# Patient Record
Sex: MALE | Race: ASIAN | NOT HISPANIC OR LATINO
[De-identification: names, ages, dates, MRNs, and addresses within clinical notes are randomized per-mention and may not be internally consistent; named-entity substitution may affect disease eponyms.]

---

## 2018-05-16 PROBLEM — Z00.00 ENCOUNTER FOR PREVENTIVE HEALTH EXAMINATION: Status: ACTIVE | Noted: 2018-05-16

## 2018-05-24 ENCOUNTER — APPOINTMENT (OUTPATIENT)
Dept: SURGICAL ONCOLOGY | Facility: CLINIC | Age: 53
End: 2018-05-24

## 2018-07-01 ENCOUNTER — EMERGENCY (EMERGENCY)
Facility: HOSPITAL | Age: 53
LOS: 1 days | Discharge: HOME | End: 2018-07-01
Attending: EMERGENCY MEDICINE | Admitting: EMERGENCY MEDICINE
Payer: MEDICAID

## 2018-07-01 VITALS
HEART RATE: 80 BPM | RESPIRATION RATE: 16 BRPM | DIASTOLIC BLOOD PRESSURE: 71 MMHG | OXYGEN SATURATION: 100 % | TEMPERATURE: 98 F | SYSTOLIC BLOOD PRESSURE: 94 MMHG

## 2018-07-01 DIAGNOSIS — Z98.89 OTHER SPECIFIED POSTPROCEDURAL STATES: Chronic | ICD-10-CM

## 2018-07-01 PROCEDURE — 99282 EMERGENCY DEPT VISIT SF MDM: CPT | Mod: 25

## 2018-07-01 NOTE — ED ADULT TRIAGE NOTE - CHIEF COMPLAINT QUOTE
Pt walk in c/o Eye Redness, Itching Burning sensation since Afternoon. Denies Light sensitivity changes in Vision, Wearing Contact lenses

## 2018-07-01 NOTE — ED PROVIDER NOTE - PHYSICAL EXAMINATION
Gen: Well appearing, well nourished, awake, alert, oriented to person, place, time/situation and in no apparent distress.  Eyes: B/l conjunctival injection. Pupils equal, round and reactive to light. No proptosis. EOMI. Visual acuity 20/20 b/l.   ENMT: Airway patent. Moist mucous membranes.  Cardiac: Normal rate, regular rhythm.  Heart sounds S1, S2. +systolic murmur.  Respiratory: Breath sounds clear and equal bilaterally. No wheezes/rales/rhonchi.  Abdomen: Abdomen soft, non-distended, non-tender, no guarding. Colostomy bag in place.  Musculoskeletal: Atraumatic. No vascular compromise.  Neuro: Alert, follows commands. Speech is clear, fluent, and appropriate. Moving all extremities spontaneously.  Skin: Skin normal color for race, warm, dry and intact. No evidence of rash. Gen: Well appearing, well nourished, awake, alert, oriented to person, place, time/situation and in no apparent distress.  Eyes: B/l conjunctival injection. No chemosis. Pupils equal, round and reactive to light. No proptosis. EOMI. Visual acuity 20/20 OS and 20/25 OD.  ENMT: Airway patent. Moist mucous membranes.  Cardiac: Normal rate, regular rhythm.  Heart sounds S1, S2. +systolic murmur.  Respiratory: Breath sounds clear and equal bilaterally. No wheezes/rales/rhonchi.  Abdomen: Abdomen soft, non-distended, non-tender, no guarding. Colostomy bag in place.  Musculoskeletal: Atraumatic. No vascular compromise.  Neuro: Alert, follows commands. Speech is clear, fluent, and appropriate. Moving all extremities spontaneously.  Skin: Skin normal color for race, warm, dry and intact. No evidence of rash.

## 2018-07-01 NOTE — ED PROVIDER NOTE - PLAN OF CARE
-- Use eye drops as instructed.   -- Follow up in our eye clinic tomorrow. The address is [address] [place] [address] [address], [place] [address]. Call [phone] to make an appointment.  -- Return to ER immediately for new or worsening symptoms, any urgent issues, or for any concerns.

## 2018-07-01 NOTE — ED PROVIDER NOTE - CARE PLAN
Assessment and plan of treatment:	-- Use eye drops as instructed.   -- Follow up in our eye clinic tomorrow. The address is [address] [place] [address] [address], [place] [address]. Call [phone] to make an appointment.  -- Return to ER immediately for new or worsening symptoms, any urgent issues, or for any concerns. Principal Discharge DX:	UV keratitis, bilateral  Assessment and plan of treatment:	-- Use eye drops as instructed.   -- Follow up in our eye clinic tomorrow. The address is [address] [place] [address] [address], [place] [address]. Call [phone] to make an appointment.  -- Return to ER immediately for new or worsening symptoms, any urgent issues, or for any concerns.

## 2018-07-01 NOTE — ED PROVIDER NOTE - ATTENDING CONTRIBUTION TO CARE
pt with B eye pain sudden onset after being outside.  Associated with some tearing.  Acuity reported to be intact     Gen:  Well appearning in NAD  Head:  NC/AT  Eyes: injected.  PERRL EOMI  Resp: No distress   Ext: no deformities  Skin: warm and dry as visualized     Presentation consistent with a UV keratitis.  Will treat symptomatically.

## 2018-07-01 NOTE — ED PROVIDER NOTE - MEDICAL DECISION MAKING DETAILS
PT with b/l eye irritation redness after walking outside. No known exposures. Ddx includes allergic reaction, UV keratitis. Plan: symptomatic control, d/c with close follow up in ophtho clinic.

## 2018-07-01 NOTE — ED PROVIDER NOTE - OBJECTIVE STATEMENT
53M h/o HTN, HLD, colon ca s/p resection (last chemo 6/26) p/w b/l eye irritation, redness, and tearing. Acute onset around 2pm this afternoon while walking outside. No environmental allergies. No known exposures. No blurry vision, diplopia, HA, fever, or trauma to the eye. + mild sensitivity to light. No hx eye surgery. Does not wear glasses or contacts (aware reported otherwise in triage).

## 2018-07-03 ENCOUNTER — APPOINTMENT (OUTPATIENT)
Dept: OPHTHALMOLOGY | Facility: CLINIC | Age: 53
End: 2018-07-03

## 2021-12-03 ENCOUNTER — APPOINTMENT (OUTPATIENT)
Dept: CARDIOLOGY | Facility: CLINIC | Age: 56
End: 2021-12-03

## 2022-05-10 ENCOUNTER — APPOINTMENT (OUTPATIENT)
Dept: CARDIOTHORACIC SURGERY | Facility: CLINIC | Age: 57
End: 2022-05-10
Payer: MEDICAID

## 2022-05-10 ENCOUNTER — APPOINTMENT (OUTPATIENT)
Dept: CV DIAGNOSITCS | Facility: HOSPITAL | Age: 57
End: 2022-05-10
Payer: MEDICAID

## 2022-05-10 ENCOUNTER — OUTPATIENT (OUTPATIENT)
Dept: OUTPATIENT SERVICES | Facility: HOSPITAL | Age: 57
LOS: 1 days | End: 2022-05-10

## 2022-05-10 ENCOUNTER — APPOINTMENT (OUTPATIENT)
Dept: CARDIOLOGY | Facility: CLINIC | Age: 57
End: 2022-05-10
Payer: MEDICAID

## 2022-05-10 ENCOUNTER — NON-APPOINTMENT (OUTPATIENT)
Age: 57
End: 2022-05-10

## 2022-05-10 VITALS
WEIGHT: 193 LBS | SYSTOLIC BLOOD PRESSURE: 121 MMHG | OXYGEN SATURATION: 100 % | BODY MASS INDEX: 28.58 KG/M2 | HEART RATE: 86 BPM | DIASTOLIC BLOOD PRESSURE: 80 MMHG | TEMPERATURE: 98.6 F | HEIGHT: 69 IN

## 2022-05-10 DIAGNOSIS — Z78.9 OTHER SPECIFIED HEALTH STATUS: ICD-10-CM

## 2022-05-10 DIAGNOSIS — Q23.1 CONGENITAL INSUFFICIENCY OF AORTIC VALVE: ICD-10-CM

## 2022-05-10 DIAGNOSIS — I35.0 NONRHEUMATIC AORTIC (VALVE) STENOSIS: ICD-10-CM

## 2022-05-10 DIAGNOSIS — Z86.79 PERSONAL HISTORY OF OTHER DISEASES OF THE CIRCULATORY SYSTEM: ICD-10-CM

## 2022-05-10 DIAGNOSIS — Z85.048 PERSONAL HISTORY OF OTHER MALIGNANT NEOPLASM OF RECTUM, RECTOSIGMOID JUNCTION, AND ANUS: ICD-10-CM

## 2022-05-10 DIAGNOSIS — Z93.59 OTHER CYSTOSTOMY STATUS: ICD-10-CM

## 2022-05-10 DIAGNOSIS — Z87.448 PERSONAL HISTORY OF OTHER DISEASES OF URINARY SYSTEM: ICD-10-CM

## 2022-05-10 DIAGNOSIS — Z82.49 FAMILY HISTORY OF ISCHEMIC HEART DISEASE AND OTHER DISEASES OF THE CIRCULATORY SYSTEM: ICD-10-CM

## 2022-05-10 DIAGNOSIS — Z98.89 OTHER SPECIFIED POSTPROCEDURAL STATES: Chronic | ICD-10-CM

## 2022-05-10 DIAGNOSIS — Z86.39 PERSONAL HISTORY OF OTHER ENDOCRINE, NUTRITIONAL AND METABOLIC DISEASE: ICD-10-CM

## 2022-05-10 DIAGNOSIS — Z87.898 PERSONAL HISTORY OF OTHER SPECIFIED CONDITIONS: ICD-10-CM

## 2022-05-10 PROCEDURE — 99203 OFFICE O/P NEW LOW 30 MIN: CPT

## 2022-05-10 PROCEDURE — 93306 TTE W/DOPPLER COMPLETE: CPT | Mod: 26

## 2022-05-10 RX ORDER — ATORVASTATIN CALCIUM 40 MG/1
40 TABLET, FILM COATED ORAL
Refills: 0 | Status: ACTIVE | COMMUNITY

## 2022-05-10 NOTE — HISTORY OF PRESENT ILLNESS
[FreeTextEntry1] : History obtained from Daughter Rowan Disla  at patient request \par \par 56 year old male with HTN, hyperlipidemia, bicuspid aortic valve with known murmur since childhood, TAA (4.7cm [facility] 2021) colorectal CA 2016 treated with abdominoperineal resection and colostomy with recurrent 11/15/2021 with urethral strictures as/p urethroplasty with local recurrent rectal CA treated with course of chemo 2021 (completed for now) with suprapubic catheter in place for urosepsis (ESBL) in February 2022 who has had a heart murmur since childhood.  \par \par [place] [facility] [place] an episode of atrial fibrillation with subsequent Echo 2/2/2022 revealing EF 60^, with Bicuspid aortic valve with ARIA 0.9cm2, mild AI, moderately dilated ascending aorta.   \par During admission he was started on Amiodarone for the episode of atrial fibrillation. Subsequently, discontinued on Amiodarone therapy 1 month later by his outpatient cardiologist as no further episodes of afib noted and advised that there could be long term side effects. Patient was maintained on Aspirin therapy and Metoprolol. \par \par Since the nicer weather, the patient has been walking in his neighborhood about 4-5 blocks without complaints. Recently went in to [facility] for leisurely walking without complaints.  Admits to being able to walk from basement in his home to 2nd floor without complaints. Denies SOB, chest pain, edema, orthopnea, PND, syncope, increase in fatigue and decrease in exercise tolerance.  \par \par He was referred to [facility] for TAVR evaluation, however, he was advised that he was not a good candidate given his bicuspid valve and was recommended for surgical AVR, however, given patients history of CA he was advised to monitor his AS since he is asymptomatic.  \par \par Patient is here today as he and his daughter, Rowan seeks a second opinion for TAVR evaluation.  \par \par Urologist/Onc[place]acc [place] [facility] [place] \par *OF NOTE: supra-pubic catheter changed by urologist monthly

## 2022-05-10 NOTE — ASSESSMENT
[FreeTextEntry1] : I had the pleasure of evaluating your patient, BÁRBARA Little who is a 56 year old male with heart failure symptoms of NYHA class 2.\par \par Mr. Little has severe AS of his bicuspid aortic valve with repeat TTE today revealing ARIA 0.7cm2, Mean gradient 52mmHg, peak gradient 84mmHg from previos TTE in 11/29/2021 with a peak Gradient of 65 mmHg and a mean Gradient 37 mmHg, and an Aortic Valve Area of 0.8 cm^2.\par Mr. Little's results have been reviewed thoroughly by the Heart Team and at this time, the patient is not a candidate for OLGA given he is asymptomatic in his lifestyle with no change in his quality of life. He continues to have continuous urological interventions for recurrent rectal CA in the pelvis interfering with the ureters.  In the future if the patient becomes symptomatic we will be more than happy to re-evaluate for possible OLGA.\par \par Plan: \par - follow up in the [facility] TAVR clinic in 6months to re-evaluate symptoms unless he develops symptoms in the meantime, then patient advised to return sooner \par - pt aware of TAA and is monitored outside of [facility] - pt to have follow up CT scan, if not done within these 6months, will need CT chest with contrast to evaluate TAA \par - follow up with your Cardiologist for serial Echocardiograms to monitor severity of AS\par - continue current medications as prescribed\par - continue close follow up [place] [facility] please bring more current information in regards to prognosis for the next office visit\par

## 2022-05-10 NOTE — REVIEW OF SYSTEMS
[Lower Ext Edema] : lower extremity edema [Shortness Of Breath] : shortness of breath [SOB on Exertion] : shortness of breath during exertion [Negative] : Heme/Lymph [Chest Pain] : no chest pain [Wheezing] : no wheezing [Cough] : no cough [Orthopnea] : no orthopnea [PND] : no PND [Dysuria] : no dysuria [Incontinence] : no incontinence [Hesitancy] : no urinary hesitancy [Nocturia] : no nocturia [FreeTextEntry7] : has colostomy in place  [FreeTextEntry8] : has chronic suprapubic catheter in place

## 2022-05-10 NOTE — REASON FOR VISIT
[Initial Evaluation] : an initial evaluation [Family Member] : family member [Source: ______] : History obtained from [unfilled]

## 2022-05-10 NOTE — PHYSICAL EXAM
[General Appearance - Alert] : alert [General Appearance - In No Acute Distress] : in no acute distress [General Appearance - Well Nourished] : well nourished [General Appearance - Well Developed] : well developed [Sclera] : the sclera and conjunctiva were normal [Jugular Venous Distention Increased] : there was no jugular-venous distention [Exaggerated Use Of Accessory Muscles For Inspiration] : no accessory muscle use [Auscultation Breath Sounds / Voice Sounds] : lungs were clear to auscultation bilaterally [III] : a grade 3 [No Pitting Edema] : no pitting edema present [Bowel Sounds] : normal bowel sounds [Abdomen Soft] : soft [Abdomen Tenderness] : non-tender [Abnormal Walk] : normal gait [Musculoskeletal - Swelling] : no joint swelling seen [] : no rash [Cranial Nerves] : cranial nerves 2-12 were intact [Oriented To Time, Place, And Person] : oriented to person, place, and time [Affect] : the affect was normal [Mood] : the mood was normal [FreeTextEntry1] : suprbubic catheter in place with no erythema, no warmth and nontender at site

## 2022-05-24 PROBLEM — Q23.1 BICUSPID AORTIC VALVE: Status: ACTIVE | Noted: 2022-05-10

## 2022-05-24 NOTE — ASSESSMENT
[FreeTextEntry1] : Mr. Little has a bicuspid aortic valve with severe asymptomatic aortic stenosis. I have reviewed his results and discussed treatment options. Given that he is asymptomatic with no impact on quality of life he will continue continued close observation. \par \par Plan: \par - follow up in the [facility] TAVR clinic in 6 months to re-evaluate symptoms unless he develops symptoms in the meantime, then patient advised to return sooner \par - pt aware of TAA and is monitored outside of [facility] - pt to have follow up CT scan, if not done within these 6 months, will need CT chest with contrast to evaluate TAA \par \par \par

## 2022-05-24 NOTE — REVIEW OF SYSTEMS
[Lower Ext Edema] : lower extremity edema [As Noted in HPI] : as noted in HPI [Negative] : Endocrine [Feeling Tired] : not feeling tired [FreeTextEntry7] : colostomy [FreeTextEntry8] : suprapubic catheter

## 2022-05-24 NOTE — HISTORY OF PRESENT ILLNESS
[FreeTextEntry1] : Mr. Little is a 56 year old male, here seeking a second opinion for treatment options of aortic stenosis with a known bicuspid aortic valve. He is feeling well with no dyspnea, angina, PND, orthopnea, dizziness, or edema. He has been able to maintain his usual level of activity with no decline in stamina or activity tolerance. He was evaluated at [facility] where he was recommended to continue conservative management given that he is asymptomatic. \par \par His past medical history includes HTN, hyperlipidemia, bicuspid aortic valve with known murmur since childhood, TAA (4.7cm Mar 2021) colorectal CA 2016 treated with abdominoperineal resection and colostomy with recurrent 11/15/2021 with urethral strictures as/p urethroplasty with local recurrent rectal CA treated with course of chemo 2021 (completed for now) with suprapubic catheter in place for urosepsis (ESBL) in February 2022 who has had a heart murmur since childhood. He had an episode of paroxysmal AFib in February while hospitalized, initially treated with Amiodarone which was discontinued as an outpt. He is currently on ASA and Metoprolol, he has had no further episodes of AFib. \par \par Patient is here today as he and his daughter, Rowan seeks a second opinion for TAVR evaluation. \par \par [place]rologist[place] [facility] [place]

## 2022-05-24 NOTE — PHYSICAL EXAM
[General Appearance - Alert] : alert [General Appearance - In No Acute Distress] : in no acute distress [Sclera] : the sclera and conjunctiva were normal [Neck Appearance] : the appearance of the neck was normal [Auscultation Breath Sounds / Voice Sounds] : lungs were clear to auscultation bilaterally [Normal Rate] : normal [III] : a grade 3 [No Pitting Edema] : no pitting edema present [Abnormal Walk] : normal gait [No Focal Deficits] : no focal deficits [Oriented To Time, Place, And Person] : oriented to person, place, and time [FreeTextEntry1] : suprapubic cath

## 2022-11-09 ENCOUNTER — APPOINTMENT (OUTPATIENT)
Dept: CARDIOLOGY | Facility: CLINIC | Age: 57
End: 2022-11-09

## 2023-07-12 ENCOUNTER — NON-APPOINTMENT (OUTPATIENT)
Age: 58
End: 2023-07-12

## 2023-07-12 ENCOUNTER — APPOINTMENT (OUTPATIENT)
Dept: CARDIOLOGY | Facility: CLINIC | Age: 58
End: 2023-07-12
Payer: MEDICAID

## 2023-07-12 VITALS
WEIGHT: 175.44 LBS | HEART RATE: 81 BPM | DIASTOLIC BLOOD PRESSURE: 77 MMHG | HEIGHT: 69 IN | OXYGEN SATURATION: 99 % | SYSTOLIC BLOOD PRESSURE: 110 MMHG | BODY MASS INDEX: 25.99 KG/M2

## 2023-07-12 PROCEDURE — 99214 OFFICE O/P EST MOD 30 MIN: CPT | Mod: 25

## 2023-07-12 PROCEDURE — 93000 ELECTROCARDIOGRAM COMPLETE: CPT

## 2023-07-12 NOTE — DISCUSSION/SUMMARY
[FreeTextEntry1] : Aortic stenosis, patient is asymptomatic at the moment, I recommend we follow him up in 3 months and reevaluate with echo and CT.  Patient understands because of his dilated aorta bicuspid valve he will likely need evaluation from surgery for aortic valve replacement.  I had a detailed discussion with the patient and his daughter about the natural history of the valve condition that he has and educated them about the warning signs.  He is on beta-blocker metoprolol's 37.5 mg extended release once daily that he will continue. [EKG obtained to assist in diagnosis and management of assessed problem(s)] : EKG obtained to assist in diagnosis and management of assessed problem(s)

## 2023-07-12 NOTE — REVIEW OF SYSTEMS
[Feeling Fatigued] : feeling fatigued [Blurry Vision] : no blurred vision [SOB] : no shortness of breath [Dyspnea on exertion] : not dyspnea during exertion [Leg Claudication] : no intermittent leg claudication

## 2023-07-12 NOTE — HISTORY OF PRESENT ILLNESS
[FreeTextEntry1] : 58-year-old male history of severe aortic stenosis bicuspid aortic valve dilated ascending aorta measuring 4.1 cm last year.  Patient is status post pelvic surgery for malignancy and has a colostomy bag.  Overall he feels that he is just generally feeling weak but no overt symptoms of shortness of breath chest pain orthopnea PND syncope.  As per the patient and his daughter they were evaluated at [facility] in the tarry operative.  Had echo done over there that shows valve area of 0.9 cm² with a mean gradient of 50.  EF is normal.  Since patient was asymptomatic he has been managed conservatively.  They had also seek consultation at [facility] and because of his dilated aorta patient was told that he will likely need open open heart for his valve and the dilated aorta in the future.

## 2023-07-12 NOTE — PHYSICAL EXAM
[Frail] : frail [Normal Conjunctiva] : normal conjunctiva [Normal Venous Pressure] : normal venous pressure [de-identified] : 3 out of 6 late peaking ejection systolic murmur at the aortic area.

## 2023-11-08 ENCOUNTER — APPOINTMENT (OUTPATIENT)
Dept: CARDIOLOGY | Facility: CLINIC | Age: 58
End: 2023-11-08

## 2024-04-03 ENCOUNTER — NON-APPOINTMENT (OUTPATIENT)
Age: 59
End: 2024-04-03

## 2024-04-03 ENCOUNTER — APPOINTMENT (OUTPATIENT)
Dept: CARDIOLOGY | Facility: CLINIC | Age: 59
End: 2024-04-03
Payer: MEDICAID

## 2024-04-03 VITALS
BODY MASS INDEX: 23.18 KG/M2 | OXYGEN SATURATION: 98 % | HEIGHT: 69 IN | DIASTOLIC BLOOD PRESSURE: 69 MMHG | SYSTOLIC BLOOD PRESSURE: 106 MMHG | WEIGHT: 156.53 LBS | HEART RATE: 85 BPM

## 2024-04-03 DIAGNOSIS — I35.0 NONRHEUMATIC AORTIC (VALVE) STENOSIS: ICD-10-CM

## 2024-04-03 DIAGNOSIS — I71.20 THORACIC AORTIC ANEURYSM, WITHOUT RUPTURE, UNSPECIFIED: ICD-10-CM

## 2024-04-03 PROCEDURE — 93000 ELECTROCARDIOGRAM COMPLETE: CPT

## 2024-04-03 PROCEDURE — 99214 OFFICE O/P EST MOD 30 MIN: CPT | Mod: 25

## 2024-04-03 NOTE — PHYSICAL EXAM
[Frail] : frail [Normal Conjunctiva] : normal conjunctiva [Normal Venous Pressure] : normal venous pressure [de-identified] : 3 out of 6 late peaking ejection systolic murmur at the aortic area.

## 2024-04-03 NOTE — DISCUSSION/SUMMARY
[FreeTextEntry1] : Aortic stenosis: On today's evaluation it appears that patient had recurrence of his malignancy leading to pathological fracture in the pelvis.  He is being followed up by [facility], they had echocardiogram performed in November last year that showed mean aortic valve gradient was 59 mmHg.  EF was reportedly 55%.  I spoke to the daughter and in discussion with her recommend that we get echocardiogram to reassess his valve and also get measurements on his ascending aorta on the transthoracic echo and if this is inconclusive perhaps get a CTA chest to look for the aortic measurements.  I also gave my information to the family and forwarded to the [facility] physicians who they are seeing on April 10 to have a discussion about his overall prognosis that will be important to know and make a decision for his aortic valve disease. [EKG obtained to assist in diagnosis and management of assessed problem(s)] : EKG obtained to assist in diagnosis and management of assessed problem(s)

## 2024-04-03 NOTE — HISTORY OF PRESENT ILLNESS
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  He does admit that he has pain in the pelvic area with the tumor pressing onto the pelvis that was the cause of the fracture.  He is still able to climb a flight of stairs without any dyspnea, chest pain or dizziness.

## 2024-04-09 ENCOUNTER — OUTPATIENT (OUTPATIENT)
Dept: OUTPATIENT SERVICES | Facility: HOSPITAL | Age: 59
LOS: 1 days | End: 2024-04-09
Payer: MEDICAID

## 2024-04-09 ENCOUNTER — RESULT REVIEW (OUTPATIENT)
Age: 59
End: 2024-04-09

## 2024-04-09 ENCOUNTER — TRANSCRIPTION ENCOUNTER (OUTPATIENT)
Age: 59
End: 2024-04-09

## 2024-04-09 ENCOUNTER — APPOINTMENT (OUTPATIENT)
Dept: CV DIAGNOSITCS | Facility: HOSPITAL | Age: 59
End: 2024-04-09

## 2024-04-09 DIAGNOSIS — I35.0 NONRHEUMATIC AORTIC (VALVE) STENOSIS: ICD-10-CM

## 2024-04-09 DIAGNOSIS — Z98.89 OTHER SPECIFIED POSTPROCEDURAL STATES: Chronic | ICD-10-CM

## 2024-04-09 PROCEDURE — 93356 MYOCRD STRAIN IMG SPCKL TRCK: CPT

## 2024-04-09 PROCEDURE — 76376 3D RENDER W/INTRP POSTPROCES: CPT | Mod: 26

## 2024-04-09 PROCEDURE — 93306 TTE W/DOPPLER COMPLETE: CPT | Mod: 26

## 2024-04-30 RX ORDER — METOPROLOL SUCCINATE 25 MG/1
25 TABLET, EXTENDED RELEASE ORAL DAILY
Qty: 135 | Refills: 3 | Status: ACTIVE | COMMUNITY
Start: 1900-01-01 | End: 1900-01-01

## 2024-07-03 ENCOUNTER — APPOINTMENT (OUTPATIENT)
Dept: CARDIOLOGY | Facility: CLINIC | Age: 59
End: 2024-07-03

## 2025-04-25 ENCOUNTER — INPATIENT (INPATIENT)
Facility: HOSPITAL | Age: 60
LOS: 10 days | Discharge: HOSPICE | End: 2025-05-06
Attending: INTERNAL MEDICINE | Admitting: INTERNAL MEDICINE
Payer: OTHER MISCELLANEOUS

## 2025-04-25 VITALS
TEMPERATURE: 98 F | RESPIRATION RATE: 18 BRPM | HEIGHT: 71 IN | OXYGEN SATURATION: 99 % | SYSTOLIC BLOOD PRESSURE: 93 MMHG | HEART RATE: 131 BPM | WEIGHT: 164.91 LBS | DIASTOLIC BLOOD PRESSURE: 62 MMHG

## 2025-04-25 DIAGNOSIS — Z98.89 OTHER SPECIFIED POSTPROCEDURAL STATES: Chronic | ICD-10-CM

## 2025-04-25 LAB
APTT BLD: 37.4 SEC — HIGH (ref 26.1–36.8)
HCT VFR BLD CALC: 23.1 % — LOW (ref 39–50)
HGB BLD-MCNC: 7.3 G/DL — LOW (ref 13–17)
INR BLD: 1.45 RATIO — HIGH (ref 0.85–1.16)
MCHC RBC-ENTMCNC: 27 PG — SIGNIFICANT CHANGE UP (ref 27–34)
MCHC RBC-ENTMCNC: 31.6 G/DL — LOW (ref 32–36)
MCV RBC AUTO: 85.6 FL — SIGNIFICANT CHANGE UP (ref 80–100)
PLATELET # BLD AUTO: 379 K/UL — SIGNIFICANT CHANGE UP (ref 150–400)
PROTHROM AB SERPL-ACNC: 16.8 SEC — HIGH (ref 9.9–13.4)
RBC # BLD: 2.7 M/UL — LOW (ref 4.2–5.8)
RBC # FLD: 19.9 % — HIGH (ref 10.3–14.5)
WBC # BLD: 12.68 K/UL — HIGH (ref 3.8–10.5)
WBC # FLD AUTO: 12.68 K/UL — HIGH (ref 3.8–10.5)

## 2025-04-25 PROCEDURE — 99285 EMERGENCY DEPT VISIT HI MDM: CPT

## 2025-04-25 PROCEDURE — 93010 ELECTROCARDIOGRAM REPORT: CPT

## 2025-04-25 NOTE — ED PROVIDER NOTE - CLINICAL SUMMARY MEDICAL DECISION MAKING FREE TEXT BOX
58yo male with pmh colon cancer htn on hospice due to colon cancer presenting with hematuria.  Has urostomy and neprhrostomy tube which has been putting out very dark bloody urine.  Has not happened before.  Hospice nurse told them to go to ED and they tried to drive to [facility] [place] [place] but has baseline pain and was unable to tolerate car ride so called EMS and brought him to nearest ED.  Patient is slightly hypotensive but bp low at baseline.  Will need labs to eval for anemia, uti.  Will d/w msk and reassess.

## 2025-04-25 NOTE — ED PROVIDER NOTE - PHYSICAL EXAMINATION
General appearance: Nontoxic appearing, conversant, afebrile    Eyes: anicteric sclerae, KEATON, EOMI   HENT: Atraumatic; oropharynx clear, MMM and no ulcerations, no pharyngeal erythema or exudate   Neck: Trachea midline; Full range of motion, supple   Pulm: CTA bl, normal respiratory effort and no intercostal retractions, normal work of breathing   CV: Tachycardic, regular, No murmurs, rubs, or gallops   Abdomen: Soft, non-tender, distended; no guarding or rebound, + colostomy with stool, + R urostomy with dark red urine   Extremities: No peripheral edema, no gross deformities, FROM x4   Skin: Dry, normal temperature, turgor and texture; no rash   Psych: Appropriate affect, cooperative

## 2025-04-25 NOTE — ED PROVIDER NOTE - PROGRESS NOTE DETAILS
Spoke with [facility] transfer center - per Dr. La over phone, the hospital is full and ED has many boarders, they are on diversion at this time and unable to accept transfers.  Recommending trying again in the daytime.  Spoke with urology, no acute interventions here.  + UA but per daughter, historically colonized.  Will treat with new symptoms and admit.

## 2025-04-25 NOTE — ED ADULT TRIAGE NOTE - CHIEF COMPLAINT QUOTE
bibems on hospice care for colorectal cancer.  Per EMS, family noticed bleeding from urostomy bag around 1830.   Pt has fentanyl patch on right upper arm and morphine pump.  Pt on augmentin for chronic infections.   hx of colorectal cancer, afib, HLD, right hip fx.  nkda.

## 2025-04-25 NOTE — ED PROVIDER NOTE - NS ED MD DISPO ISOLATION TYPES
Problem: Chronic Conditions and Co-morbidities  Goal: Patient's chronic conditions and co-morbidity symptoms are monitored and maintained or improved  12/30/2024 2200 by Zina Garcia RN  Outcome: Progressing     Problem: Safety - Adult  Goal: Free from fall injury  12/30/2024 2200 by Zina Garcia RN  Outcome: Progressing     Problem: Skin/Tissue Integrity  Goal: Absence of new skin breakdown  Description: 1.  Monitor for areas of redness and/or skin breakdown  2.  Assess vascular access sites hourly  3.  Every 4-6 hours minimum:  Change oxygen saturation probe site  4.  Every 4-6 hours:  If on nasal continuous positive airway pressure, respiratory therapy assess nares and determine need for appliance change or resting period.  12/30/2024 2200 by Zina Garcia RN  Outcome: Progressing     Problem: ABCDS Injury Assessment  Goal: Absence of physical injury  12/30/2024 2200 by Zina Garcia RN  Outcome: Progressing     Problem: Discharge Planning  Goal: Discharge to home or other facility with appropriate resources  12/30/2024 2200 by Zina Garcia RN  Outcome: Progressing     Problem: Pain  Goal: Verbalizes/displays adequate comfort level or baseline comfort level  12/30/2024 2200 by Zina Garcia RN  Outcome: Progressing      None

## 2025-04-26 DIAGNOSIS — Z29.9 ENCOUNTER FOR PROPHYLACTIC MEASURES, UNSPECIFIED: ICD-10-CM

## 2025-04-26 DIAGNOSIS — I48.0 PAROXYSMAL ATRIAL FIBRILLATION: ICD-10-CM

## 2025-04-26 DIAGNOSIS — E78.5 HYPERLIPIDEMIA, UNSPECIFIED: ICD-10-CM

## 2025-04-26 DIAGNOSIS — Z93.3 COLOSTOMY STATUS: Chronic | ICD-10-CM

## 2025-04-26 DIAGNOSIS — Z98.890 OTHER SPECIFIED POSTPROCEDURAL STATES: Chronic | ICD-10-CM

## 2025-04-26 DIAGNOSIS — I10 ESSENTIAL (PRIMARY) HYPERTENSION: ICD-10-CM

## 2025-04-26 DIAGNOSIS — L98.429 NON-PRESSURE CHRONIC ULCER OF BACK WITH UNSPECIFIED SEVERITY: ICD-10-CM

## 2025-04-26 DIAGNOSIS — R31.9 HEMATURIA, UNSPECIFIED: ICD-10-CM

## 2025-04-26 DIAGNOSIS — C19 MALIGNANT NEOPLASM OF RECTOSIGMOID JUNCTION: ICD-10-CM

## 2025-04-26 LAB
ABO RH CONFIRMATION: SIGNIFICANT CHANGE UP
ALBUMIN SERPL ELPH-MCNC: 1.3 G/DL — LOW (ref 3.3–5)
ALP SERPL-CCNC: 93 U/L — SIGNIFICANT CHANGE UP (ref 40–120)
ALT FLD-CCNC: 7 U/L — LOW (ref 12–78)
ANION GAP SERPL CALC-SCNC: 6 MMOL/L — SIGNIFICANT CHANGE UP (ref 5–17)
ANION GAP SERPL CALC-SCNC: 8 MMOL/L — SIGNIFICANT CHANGE UP (ref 5–17)
ANISOCYTOSIS BLD QL: SLIGHT — SIGNIFICANT CHANGE UP
APPEARANCE UR: ABNORMAL
AST SERPL-CCNC: 10 U/L — LOW (ref 15–37)
BACTERIA # UR AUTO: ABNORMAL /HPF
BASOPHILS # BLD AUTO: 0 K/UL — SIGNIFICANT CHANGE UP (ref 0–0.2)
BASOPHILS NFR BLD AUTO: 0 % — SIGNIFICANT CHANGE UP (ref 0–2)
BILIRUB SERPL-MCNC: 0.6 MG/DL — SIGNIFICANT CHANGE UP (ref 0.2–1.2)
BILIRUB UR-MCNC: ABNORMAL
BLD GP AB SCN SERPL QL: SIGNIFICANT CHANGE UP
BUN SERPL-MCNC: 25 MG/DL — HIGH (ref 7–23)
BUN SERPL-MCNC: 28 MG/DL — HIGH (ref 7–23)
CALCIUM SERPL-MCNC: 7.3 MG/DL — LOW (ref 8.5–10.1)
CALCIUM SERPL-MCNC: 7.7 MG/DL — LOW (ref 8.5–10.1)
CHLORIDE SERPL-SCNC: 109 MMOL/L — HIGH (ref 96–108)
CHLORIDE SERPL-SCNC: 110 MMOL/L — HIGH (ref 96–108)
CO2 SERPL-SCNC: 20 MMOL/L — LOW (ref 22–31)
CO2 SERPL-SCNC: 22 MMOL/L — SIGNIFICANT CHANGE UP (ref 22–31)
COLOR SPEC: ABNORMAL
CREAT SERPL-MCNC: 0.78 MG/DL — SIGNIFICANT CHANGE UP (ref 0.5–1.3)
CREAT SERPL-MCNC: 0.92 MG/DL — SIGNIFICANT CHANGE UP (ref 0.5–1.3)
DIFF PNL FLD: ABNORMAL
EGFR: 103 ML/MIN/1.73M2 — SIGNIFICANT CHANGE UP
EGFR: 103 ML/MIN/1.73M2 — SIGNIFICANT CHANGE UP
EGFR: 96 ML/MIN/1.73M2 — SIGNIFICANT CHANGE UP
EGFR: 96 ML/MIN/1.73M2 — SIGNIFICANT CHANGE UP
ELLIPTOCYTES BLD QL SMEAR: SLIGHT — SIGNIFICANT CHANGE UP
EOSINOPHIL # BLD AUTO: 0 K/UL — SIGNIFICANT CHANGE UP (ref 0–0.5)
EOSINOPHIL NFR BLD AUTO: 0 % — SIGNIFICANT CHANGE UP (ref 0–6)
EPI CELLS # UR: PRESENT
GLUCOSE SERPL-MCNC: 117 MG/DL — HIGH (ref 70–99)
GLUCOSE SERPL-MCNC: 94 MG/DL — SIGNIFICANT CHANGE UP (ref 70–99)
GLUCOSE UR QL: NEGATIVE MG/DL — SIGNIFICANT CHANGE UP
HCT VFR BLD CALC: 18.9 % — CRITICAL LOW (ref 39–50)
HCT VFR BLD CALC: 21.3 % — LOW (ref 39–50)
HGB BLD-MCNC: 6 G/DL — CRITICAL LOW (ref 13–17)
HGB BLD-MCNC: 6.8 G/DL — CRITICAL LOW (ref 13–17)
HYPOCHROMIA BLD QL: SIGNIFICANT CHANGE UP
KETONES UR-MCNC: NEGATIVE MG/DL — SIGNIFICANT CHANGE UP
LEUKOCYTE ESTERASE UR-ACNC: ABNORMAL
LYMPHOCYTES # BLD AUTO: 0.38 K/UL — LOW (ref 1–3.3)
LYMPHOCYTES # BLD AUTO: 3 % — LOW (ref 13–44)
MANUAL SMEAR VERIFICATION: SIGNIFICANT CHANGE UP
MCHC RBC-ENTMCNC: 27.2 PG — SIGNIFICANT CHANGE UP (ref 27–34)
MCHC RBC-ENTMCNC: 27.3 PG — SIGNIFICANT CHANGE UP (ref 27–34)
MCHC RBC-ENTMCNC: 31.7 G/DL — LOW (ref 32–36)
MCHC RBC-ENTMCNC: 31.9 G/DL — LOW (ref 32–36)
MCV RBC AUTO: 85.2 FL — SIGNIFICANT CHANGE UP (ref 80–100)
MCV RBC AUTO: 85.9 FL — SIGNIFICANT CHANGE UP (ref 80–100)
MICROCYTES BLD QL: SLIGHT — SIGNIFICANT CHANGE UP
MONOCYTES # BLD AUTO: 0.51 K/UL — SIGNIFICANT CHANGE UP (ref 0–0.9)
MONOCYTES NFR BLD AUTO: 4 % — SIGNIFICANT CHANGE UP (ref 2–14)
NEUTROPHILS # BLD AUTO: 11.79 K/UL — HIGH (ref 1.8–7.4)
NEUTROPHILS NFR BLD AUTO: 92 % — HIGH (ref 43–77)
NEUTS BAND # BLD: 1 % — SIGNIFICANT CHANGE UP (ref 0–8)
NEUTS BAND NFR BLD: 1 % — SIGNIFICANT CHANGE UP (ref 0–8)
NITRITE UR-MCNC: POSITIVE
NRBC # BLD: 0 /100 WBCS — SIGNIFICANT CHANGE UP (ref 0–0)
NRBC BLD AUTO-RTO: 0 /100 WBCS — SIGNIFICANT CHANGE UP (ref 0–0)
NRBC BLD AUTO-RTO: 0 /100 WBCS — SIGNIFICANT CHANGE UP (ref 0–0)
NRBC BLD AUTO-RTO: SIGNIFICANT CHANGE UP /100 WBCS (ref 0–0)
NRBC BLD-RTO: 0 /100 WBCS — SIGNIFICANT CHANGE UP (ref 0–0)
OVALOCYTES BLD QL SMEAR: SLIGHT — SIGNIFICANT CHANGE UP
PH UR: 7 — SIGNIFICANT CHANGE UP (ref 5–8)
PLAT MORPH BLD: NORMAL — SIGNIFICANT CHANGE UP
PLATELET # BLD AUTO: 276 K/UL — SIGNIFICANT CHANGE UP (ref 150–400)
PLATELET # BLD AUTO: 335 K/UL — SIGNIFICANT CHANGE UP (ref 150–400)
POTASSIUM SERPL-MCNC: 3.9 MMOL/L — SIGNIFICANT CHANGE UP (ref 3.5–5.3)
POTASSIUM SERPL-MCNC: 4.1 MMOL/L — SIGNIFICANT CHANGE UP (ref 3.5–5.3)
POTASSIUM SERPL-SCNC: 3.9 MMOL/L — SIGNIFICANT CHANGE UP (ref 3.5–5.3)
POTASSIUM SERPL-SCNC: 4.1 MMOL/L — SIGNIFICANT CHANGE UP (ref 3.5–5.3)
PROT SERPL-MCNC: 5.1 GM/DL — LOW (ref 6–8.3)
PROT UR-MCNC: >=1000 MG/DL
RBC # BLD: 2.2 M/UL — LOW (ref 4.2–5.8)
RBC # BLD: 2.5 M/UL — LOW (ref 4.2–5.8)
RBC # FLD: 20.2 % — HIGH (ref 10.3–14.5)
RBC # FLD: 20.3 % — HIGH (ref 10.3–14.5)
RBC BLD AUTO: ABNORMAL
RBC CASTS # UR COMP ASSIST: ABNORMAL /HPF
SODIUM SERPL-SCNC: 137 MMOL/L — SIGNIFICANT CHANGE UP (ref 135–145)
SODIUM SERPL-SCNC: 138 MMOL/L — SIGNIFICANT CHANGE UP (ref 135–145)
SP GR SPEC: 1.03 — SIGNIFICANT CHANGE UP (ref 1–1.03)
UROBILINOGEN FLD QL: 0.2 MG/DL — SIGNIFICANT CHANGE UP (ref 0.2–1)
WBC # BLD: 10.72 K/UL — HIGH (ref 3.8–10.5)
WBC # BLD: 9.33 K/UL — SIGNIFICANT CHANGE UP (ref 3.8–10.5)
WBC # FLD AUTO: 10.72 K/UL — HIGH (ref 3.8–10.5)
WBC # FLD AUTO: 9.33 K/UL — SIGNIFICANT CHANGE UP (ref 3.8–10.5)
WBC UR QL: 8 /HPF — HIGH (ref 0–5)

## 2025-04-26 PROCEDURE — 99223 1ST HOSP IP/OBS HIGH 75: CPT | Mod: GW

## 2025-04-26 PROCEDURE — 99497 ADVNCD CARE PLAN 30 MIN: CPT

## 2025-04-26 RX ORDER — SIMETHICONE 80 MG
1.2 TABLET,CHEWABLE ORAL
Refills: 0 | DISCHARGE

## 2025-04-26 RX ORDER — SIMETHICONE 80 MG
80 TABLET,CHEWABLE ORAL DAILY
Refills: 0 | Status: DISCONTINUED | OUTPATIENT
Start: 2025-04-26 | End: 2025-05-06

## 2025-04-26 RX ORDER — NALOXONE HYDROCHLORIDE 0.4 MG/ML
0.4 INJECTION, SOLUTION INTRAMUSCULAR; INTRAVENOUS; SUBCUTANEOUS
Refills: 0 | Status: DISCONTINUED | OUTPATIENT
Start: 2025-04-26 | End: 2025-04-26

## 2025-04-26 RX ORDER — PIPERACILLIN-TAZO-DEXTROSE,ISO 2.25G/50ML
3.38 IV SOLUTION, PIGGYBACK PREMIX FROZEN(ML) INTRAVENOUS ONCE
Refills: 0 | Status: COMPLETED | OUTPATIENT
Start: 2025-04-26 | End: 2025-04-26

## 2025-04-26 RX ORDER — MIDODRINE HYDROCHLORIDE 5 MG/1
10 TABLET ORAL ONCE
Refills: 0 | Status: COMPLETED | OUTPATIENT
Start: 2025-04-26 | End: 2025-04-26

## 2025-04-26 RX ORDER — OLANZAPINE 10 MG/1
5 TABLET ORAL AT BEDTIME
Refills: 0 | Status: DISCONTINUED | OUTPATIENT
Start: 2025-04-26 | End: 2025-05-06

## 2025-04-26 RX ORDER — MELATONIN 5 MG
3 TABLET ORAL AT BEDTIME
Refills: 0 | Status: DISCONTINUED | OUTPATIENT
Start: 2025-04-26 | End: 2025-05-06

## 2025-04-26 RX ORDER — PIPERACILLIN-TAZO-DEXTROSE,ISO 2.25G/50ML
3.38 IV SOLUTION, PIGGYBACK PREMIX FROZEN(ML) INTRAVENOUS EVERY 8 HOURS
Refills: 0 | Status: DISCONTINUED | OUTPATIENT
Start: 2025-04-26 | End: 2025-04-28

## 2025-04-26 RX ORDER — CEFTRIAXONE 500 MG/1
1000 INJECTION, POWDER, FOR SOLUTION INTRAMUSCULAR; INTRAVENOUS ONCE
Refills: 0 | Status: COMPLETED | OUTPATIENT
Start: 2025-04-26 | End: 2025-04-26

## 2025-04-26 RX ORDER — RAMELTEON 8 MG/1
1 TABLET, FILM COATED ORAL
Refills: 0 | DISCHARGE

## 2025-04-26 RX ORDER — AMOXICILLIN AND CLAVULANATE POTASSIUM 500; 125 MG/1; MG/1
400 TABLET, FILM COATED ORAL DAILY
Refills: 0 | Status: DISCONTINUED | OUTPATIENT
Start: 2025-04-26 | End: 2025-04-26

## 2025-04-26 RX ORDER — OLANZAPINE 10 MG/1
5 TABLET ORAL AT BEDTIME
Refills: 0 | Status: DISCONTINUED | OUTPATIENT
Start: 2025-04-26 | End: 2025-04-28

## 2025-04-26 RX ORDER — AMIODARONE HYDROCHLORIDE 50 MG/ML
100 INJECTION, SOLUTION INTRAVENOUS DAILY
Refills: 0 | Status: DISCONTINUED | OUTPATIENT
Start: 2025-04-26 | End: 2025-05-01

## 2025-04-26 RX ORDER — ACETAMINOPHEN 500 MG/5ML
650 LIQUID (ML) ORAL EVERY 6 HOURS
Refills: 0 | Status: DISCONTINUED | OUTPATIENT
Start: 2025-04-26 | End: 2025-05-06

## 2025-04-26 RX ADMIN — CEFTRIAXONE 100 MILLIGRAM(S): 500 INJECTION, POWDER, FOR SOLUTION INTRAMUSCULAR; INTRAVENOUS at 04:41

## 2025-04-26 RX ADMIN — Medication 20 MILLIGRAM(S): at 12:35

## 2025-04-26 RX ADMIN — Medication 30 MILLILITER(S): at 17:50

## 2025-04-26 RX ADMIN — AMIODARONE HYDROCHLORIDE 100 MILLIGRAM(S): 50 INJECTION, SOLUTION INTRAVENOUS at 06:34

## 2025-04-26 RX ADMIN — Medication 25 GRAM(S): at 18:44

## 2025-04-26 RX ADMIN — Medication 25 GRAM(S): at 12:34

## 2025-04-26 RX ADMIN — Medication 30 MILLILITER(S): at 18:41

## 2025-04-26 RX ADMIN — MIDODRINE HYDROCHLORIDE 10 MILLIGRAM(S): 5 TABLET ORAL at 06:33

## 2025-04-26 RX ADMIN — Medication 200 GRAM(S): at 06:33

## 2025-04-26 NOTE — CHART NOTE - NSCHARTNOTEFT_GEN_A_CORE
Nursing supervisor asked for help with patient's PCA morphine pump.   Family at bedside. I discussed with the Family (son and daughter) about home PCA morphine use and inpatient dosage I placed in the sunrise.   I updated nurse about it.   I updated Dr Correa about it and also nocturnist Dr Little.   Per pharmacy, we can use iv narcan instead of spray in case of narcotic overdose.  Urology is consulted for hematuria by attending doc.

## 2025-04-26 NOTE — H&P ADULT - PROBLEM SELECTOR PLAN 5
-Continue with home amiodarone 100 mg QD   -No anticoagulation given hematuria   -Risks and benefits discussed with patient and daughter

## 2025-04-26 NOTE — H&P ADULT - HISTORY OF PRESENT ILLNESS
59M w/ PMHx of colorectal CA s/p colostomy on hospice care, urethral stricture s/p urostomy bag, severe AS/Bicuspid AV, TAA, HTN, HLD, pAfib, sacral ulcer s/p wound bag and right hip fracture who presents to the ED with bleeding from urostomy bag around 1830. History is obtained from daughter at bedside. Per daughter, there has been increased bleeding and clots in the urostomy bsg since yesterday. Patient has has history of multiple antibiotic resistance UTI. Patient currently denies any acute symptoms or concerns. Denies headache, dizziness, chest pain, palpitations, SOB, abdominal pain, joint pain, diarrhea/constipation, or urinary symptoms.

## 2025-04-26 NOTE — PROVIDER CONTACT NOTE (MEDICATION) - SITUATION
Dr. Morton contacted to clarify PCA pump order
Received patient from ED hold with morphine PCA pump from home, family requesting to keep using home PCA pump while patient is in hospital

## 2025-04-26 NOTE — H&P ADULT - PROBLEM SELECTOR PLAN 2
::colorectal CA s/p colostomy on hospice care  -Oncologist:: Dr Aneudy Contreras at [facility]   -Urologist: Dr Isacc Keith at [facility]  -c/w home Morphine pump (Obtain settings) and fentanyl patch 200 mcg / 72 hours   -c/w home simethicone, famotidine, olanzapine 5 mg ODT QHS ::colorectal CA s/p colostomy on hospice care  -Oncologist:: Dr Aneudy Contre[place] [facility] [place]   -Urologist: Dr Iscac Keith at [facility] [place]  -c/w home Morphine pump (Obtain settings) and fentanyl patch 200 mcg / 72 hours   -c/w home simethicone, famotidine, olanzapine 5 mg ODT QHS  -Hold home Augmentin ppx while on Zosyn

## 2025-04-26 NOTE — PROVIDER CONTACT NOTE (CRITICAL VALUE NOTIFICATION) - RECOMMENDATIONS
blood transfusion SAVANNA Rutherford made aware, blood to be ordered SAVANNA Rutherford made aware, blood transfusion to be ordered

## 2025-04-26 NOTE — H&P ADULT - NSHPLABSRESULTS_GEN_ALL_CORE
7.3    12.68 )-----------( 379      ( 2025 23:35 )             23.1     137  |  109[H]  |  25[H]  ----------------------------<  117[H]       3.9   |  20[L]  |  0.92    Ca    7.7[L]      2025 23:35    TPro  5.1[L]  /  Alb  1.3[L]  /  TBili  0.6  /  DBili  x   /  AST  10[L]  /  ALT  7[L]  /  AlkPhos  93      PT/INR: 16.8/1.45 (25 @ 23:35)  PTT: 37.4 (25 @ 23:35)                          Urinalysis Basic - ( 2025 03:47 )  Color: Red / Appearance: Cloudy / S.028 / pH: 7.0  Gluc: Negative mg/dL / Ketone: Negative mg/dL  / Bili: Small / Urobili: 0.2 mg/dL   Blood: Large / Protein: >=1000 mg/dL / Nitrite: Positive   Leuk Esterase: Moderate / RBC: Too Numerous to count /HPF / WBC 8 /HPF   Sq Epi: x / Bacteria: Few /HPF  Hyaline Casts: x/WBC Casts: x          Urinalysis with Rflx Culture (collected 2025 03:47)

## 2025-04-26 NOTE — H&P ADULT - PROBLEM SELECTOR PLAN 3
-Currently on no home medications   -BP 93/62 on arrival   -Maintain MAP >65   -s/p midodrine 10 mg PO in ED   -Closely monitor BP

## 2025-04-26 NOTE — PROVIDER CONTACT NOTE (OTHER) - SITUATION
discussed with SAVANNA Cantor pt low bp also blood ready for transfusion states its ok to stop iv medication for blood transfusion

## 2025-04-26 NOTE — H&P ADULT - PROBLEM SELECTOR PLAN 7
VTE PPx: SCD  Nutrition: DASH/TLC Diet  Fluids: PRN  Electrolytes: Maintain K>4, Mag >2, Phos > 3  Access: PIV  Dispo: pending clinical improvement

## 2025-04-26 NOTE — H&P ADULT - ASSESSMENT
59M w/ PMHx of colorectal CA s/p colostomy on hospice care, urethral stricture s/p urostomy bag, severe AS/Bicuspid AV, TAA, HTN, HLD, pAfib, sacral ulcer s/p wound bag and right hip fracture who presents to the ED with bleeding from urostomy bag. Admitted to medicine for further management and monitoring. Detailed plan as below:

## 2025-04-26 NOTE — PROVIDER CONTACT NOTE (CRITICAL VALUE NOTIFICATION) - ASSESSMENT
Patient has dark red blood in urostomy bag, no other s/s of acute distress noted
Patient has dark red blood in urostomy bag, no other s/s of acute distress noted

## 2025-04-26 NOTE — PROVIDER CONTACT NOTE (CRITICAL VALUE NOTIFICATION) - ACTION/TREATMENT ORDERED:
SAVANNA Rutherford made aware, blood to be ordered SAVANNA Rutherford made aware, blood transfusion to be ordered

## 2025-04-26 NOTE — H&P ADULT - NSHPPHYSICALEXAM_GEN_ALL_CORE
- GENERAL: Alert and oriented x 3  - EYES: EOMI. Anicteric.  - HENT: No scleral icterus. No cervical lymphadenopathy.  - LUNGS: Clear to auscultation bilaterally. No accessory muscle use.  - CARDIOVASCULAR: Regular rate and rhythm. No JVD.  - ABDOMEN: Soft, non-tender no guarding or rebound tenderness. +Colostomy w/ stool, R urostomy w/ blood products   - EXTREMITIES: 2+ pitting edema. Non-tender.  - SKIN: Sacral ulcer w/ wound bag   - NEUROLOGIC: No focal neurological deficits. CN II-XII grossly intact, but not individually tested.  - PSYCHIATRIC: Cooperative. Appropriate mood and affect.

## 2025-04-26 NOTE — PROGRESS NOTE ADULT - CRITICAL CARE ATTENDING COMMENT
I spent a total of 70 minutes on the date of this encounter coordinating the patient's care. This includes reviewing documentation pertinent to this hospital stay, results and imaging in addition to completing a MOLST Form with orders, progress note and physical examination on the patient. Further tests, medications, and procedures ( CT abd) , urology consult have been ordered as indicated. Laboratory results and the plan of care were communicated to the patient and their family member. Supporting documentation was completed and added to the patient's chart.  Blood was ordered due to acute blood loss anemia and hypotension.

## 2025-04-26 NOTE — H&P ADULT - NSICDXPASTMEDICALHX_GEN_ALL_CORE_FT
PAST MEDICAL HISTORY:  Chronic atrial fibrillation     Essential hypertension     H/O aortic valve stenosis     HLD (hyperlipidemia)     Rectal cancer     Sacral ulcer     Thoracic aortic aneurysm (TAA)

## 2025-04-26 NOTE — PROGRESS NOTE ADULT - SUBJECTIVE AND OBJECTIVE BOX
HPI:  59M w/ PMHx of colorectal CA s/p colostomy on hospice care, urethral stricture s/p urostomy bag, severe AS/Bicuspid AV, TAA, HTN, HLD, pAfib, sacral ulcer s/p wound bag and right hip fracture who presents to the ED with bleeding from urostomy bag around 1830. History is obtained from daughter at bedside. Per daughter, there has been increased bleeding and clots in the urostomy bsg since yesterday. Patient has has history of multiple antibiotic resistance UTI. Patient currently denies any acute symptoms or concerns. Denies headache, dizziness, chest pain, palpitations, SOB, abdominal pain, joint pain, diarrhea/constipation, or urinary symptoms.   (26 Apr 2025 06:00)      PAST MEDICAL & SURGICAL HISTORY:  Rectal cancer      Essential hypertension      HLD (hyperlipidemia)      Chronic atrial fibrillation      H/O aortic valve stenosis      Thoracic aortic aneurysm (TAA)      Sacral ulcer      History of rectal surgery      S/P colostomy      History of urostomy          REVIEW OF SYSTEMS:    CONSTITUTIONAL: No fever, weight loss, or fatigue  EYES: No eye pain, visual disturbances, or discharge  ENMT:  No difficulty hearing, tinnitus, vertigo; No sinus or throat pain  NECK: No pain or stiffness  BREASTS: No pain, masses, or nipple discharge  RESPIRATORY: No cough, wheezing, chills or hemoptysis; No shortness of breath  CARDIOVASCULAR: No chest pain, palpitations, dizziness, or leg swelling  GASTROINTESTINAL: No abdominal or epigastric pain. No nausea, vomiting, or hematemesis; No diarrhea or constipation. No melena or hematochezia.  GENITOURINARY: No dysuria, frequency, hematuria, or incontinence  NEUROLOGICAL: No headaches, memory loss, loss of strength, numbness, or tremors  SKIN: No itching, burning, rashes, or lesions   LYMPH NODES: No enlarged glands  ENDOCRINE: No heat or cold intolerance; No hair loss  MUSCULOSKELETAL: No joint pain or swelling; No muscle, back, or extremity pain  PSYCHIATRIC: No depression, anxiety, mood swings, or difficulty sleeping  HEME/LYMPH: No easy bruising, or bleeding gums  ALLERGY AND IMMUNOLOGIC: No hives or eczema      MEDICATIONS  (STANDING):  aMIOdarone    Tablet 100 milliGRAM(s) Oral daily  chlorhexidine 4% Liquid 1 Application(s) Topical <User Schedule>  famotidine    Tablet 20 milliGRAM(s) Oral daily  fentaNYL   Patch 100 MICROgram(s)/Hr 2 Patch Transdermal every 72 hours  morphine PCA (5 mG/mL) 30 milliLiter(s) PCA Continuous PCA Continuous  OLANZapine 5 milliGRAM(s) Oral at bedtime  OLANZapine Disintegrating Tablet 5 milliGRAM(s) Oral at bedtime  piperacillin/tazobactam IVPB.. 3.375 Gram(s) IV Intermittent every 8 hours    MEDICATIONS  (PRN):  acetaminophen     Tablet .. 650 milliGRAM(s) Oral every 6 hours PRN Temp greater or equal to 38C (100.4F), Mild Pain (1 - 3)  melatonin 3 milliGRAM(s) Oral at bedtime PRN Insomnia  simethicone 80 milliGRAM(s) Chew daily PRN Indigestion  sodium chloride 0.9% lock flush 10 milliLiter(s) IV Push every 1 hour PRN Pre/post blood products, medications, blood draw, and to maintain line patency      Allergies    No Known Allergies    Intolerances        SOCIAL HISTORY:    FAMILY HISTORY:      Vital Signs Last 24 Hrs  T(C): 36.3 (26 Apr 2025 22:29), Max: 36.8 (26 Apr 2025 04:48)  T(F): 97.3 (26 Apr 2025 22:29), Max: 98.2 (26 Apr 2025 04:48)  HR: 100 (26 Apr 2025 22:29) (100 - 113)  BP: 77/55 (26 Apr 2025 22:29) (75/58 - 111/80)  BP(mean): 87 (26 Apr 2025 08:32) (87 - 87)  RR: 16 (26 Apr 2025 22:29) (16 - 22)  SpO2: 98% (26 Apr 2025 22:29) (97% - 100%)    Parameters below as of 26 Apr 2025 21:28  Patient On (Oxygen Delivery Method): room air        PHYSICAL EXAM:    GENERAL: NAD, well-groomed, well-developed  HEAD:  Atraumatic, Normocephalic  EYES: EOMI, PERRLA, conjunctiva and sclera clear  ENMT: No tonsillar erythema, exudates, or enlargement; Moist mucous membranes, Good dentition, No lesions  NECK: Supple, No JVD, Normal thyroid  NERVOUS SYSTEM:  Alert & Oriented X3, Good concentration; Motor Strength 5/5 B/L upper and lower extremities; DTRs 2+ intact and symmetric  CHEST/LUNG: Clear to percussion bilaterally; No rales, rhonchi, wheezing, or rubs  HEART: Regular rate and rhythm; No murmurs, rubs, or gallops  ABDOMEN: Soft, Nontender, Nondistended; Bowel sounds present  EXTREMITIES:  2+ Peripheral Pulses, No clubbing, cyanosis, or edema  LYMPH: No lymphadenopathy notedRECTAL: No masses, prostate normal size and smooth, Guiac negative   BREAST: No palpatble masses, skin no lesions no retractions, no discharages. adenexa no palpable masses noted   GYN: uterus normal size, adenexa no palpable masses, no CMT, no uterine discharge   SKIN: No rashes or lesions      LABS:                        6.0    9.33  )-----------( 276      ( 26 Apr 2025 17:01 )             18.9     04-26    138  |  110[H]  |  28[H]  ----------------------------<  94  4.1   |  22  |  0.78    Ca    7.3[L]      26 Apr 2025 14:58    TPro  5.1[L]  /  Alb  1.3[L]  /  TBili  0.6  /  DBili  x   /  AST  10[L]  /  ALT  7[L]  /  AlkPhos  93  04-25    PT/INR - ( 25 Apr 2025 23:35 )   PT: 16.8 sec;   INR: 1.45 ratio         PTT - ( 25 Apr 2025 23:35 )  PTT:37.4 sec  Urinalysis Basic - ( 26 Apr 2025 14:58 )    Color: x / Appearance: x / SG: x / pH: x  Gluc: 94 mg/dL / Ketone: x  / Bili: x / Urobili: x   Blood: x / Protein: x / Nitrite: x   Leuk Esterase: x / RBC: x / WBC x   Sq Epi: x / Non Sq Epi: x / Bacteria: x        RADIOLOGY & ADDITIONAL STUDIES:   HPI:  59M w/ PMHx of colorectal CA s/p colostomy on hospice care, urethral stricture s/p urostomy bag ( nephrostomies bilat placed 4/11/25 by [facility] Urologist ), severe AS/Bicuspid AV, TAA, HTN, HLD, pAfib, sacral ulcer s/p wound bag and right hip fracture who presents to the ED with bleeding from urostomy bag around 1830 . History is obtained from daughter at bedside. Per daughter, there has been increased bleeding and clots in the urostomy bsg since yesterday. Patient has has history of multiple antibiotic resistance UTI. Patient currently denies any acute symptoms or concerns. Denies headache, dizziness, chest pain, palpitations, SOB, abdominal pain, joint pain, diarrhea/constipation, or urinary symptoms.   (26 Apr 2025 06:00)    Pt seen early this afternoon by my myself with pt's son at bedside and spoke with pt's daughter via phone as well.  They would like pt to be transferred to [facility] under their oncologist Dr. Russell Mesa.  I called the transfer center at 1:05 pm, per [facility] on call NP and team,  there are no beds available at this time and they will let us know if a bed becomes available.  [facility] , Dr. La, on call was contacted  yesterday by ED physician and last night a bed was not available.    Pt and family have been managing pain with a home PCA pump and he has been on home hospice.  They would like to complete a MOLST form now as until now pt has not done this.  MOLST details reviewed in detail and completed with pt, son at bedside and RN witness at bedside,  OLGA Yates.   Pt is DNR/DNI with NIV trial ( see detailed form)      PAST MEDICAL & SURGICAL HISTORY:  Rectal cancer  Essential hypertension  HLD (hyperlipidemia)  Chronic atrial fibrillation  H/O aortic valve stenosis  no a surgical candidate  Thoracic aortic aneurysm (TAA)  Sacral ulcer  History of rectal surgery      S/P colostomy  History of urostomy    REVIEW OF SYSTEMS:  CONSTITUTIONAL: weakness  Pain in rectal area on right, pt uses PCA pump and tells me fentanyl patch is to be changed today    MEDICATIONS  (STANDING):  aMIOdarone    Tablet 100 milliGRAM(s) Oral daily  chlorhexidine 4% Liquid 1 Application(s) Topical <User Schedule>  famotidine    Tablet 20 milliGRAM(s) Oral daily  fentaNYL   Patch 100 MICROgram(s)/Hr 2 Patch Transdermal every 72 hours  morphine PCA (5 mG/mL) 30 milliLiter(s) PCA Continuous PCA Continuous  OLANZapine 5 milliGRAM(s) Oral at bedtime  OLANZapine Disintegrating Tablet 5 milliGRAM(s) Oral at bedtime  piperacillin/tazobactam IVPB.. 3.375 Gram(s) IV Intermittent every 8 hours    MEDICATIONS  (PRN):  acetaminophen     Tablet .. 650 milliGRAM(s) Oral every 6 hours PRN Temp greater or equal to 38C (100.4F), Mild Pain (1 - 3)  melatonin 3 milliGRAM(s) Oral at bedtime PRN Insomnia  simethicone 80 milliGRAM(s) Chew daily PRN Indigestion  sodium chloride 0.9% lock flush 10 milliLiter(s) IV Push every 1 hour PRN Pre/post blood products, medications, blood draw, and to maintain line patency      Allergies  No Known Allergies    Intolerances    SOCIAL HISTORY:pt lives with his family. His daughter (an O Therapist) and son help in changing his nephrostomy bag    FAMILY HISTORY: non-contrib    Vital Signs Last 24 Hrs  T(C): 36.3 (26 Apr 2025 22:29), Max: 36.8 (26 Apr 2025 04:48)  T(F): 97.3 (26 Apr 2025 22:29), Max: 98.2 (26 Apr 2025 04:48)  HR: 100 (26 Apr 2025 22:29) (100 - 113)  BP: 77/55 (26 Apr 2025 22:29) (75/58 - 111/80)  BP(mean): 87 (26 Apr 2025 08:32) (87 - 87)  RR: 16 (26 Apr 2025 22:29) (16 - 22)  SpO2: 98% (26 Apr 2025 22:29) (97% - 100%)    Parameters below as of 26 Apr 2025 21:28  Patient On (Oxygen Delivery Method): room air    PHYSICAL EXAM:  GENERAL:  frail, pale gentleman  HEAD:  Atraumatic, Normocephalic  EYES: EOMI, PERRLA, conjunctiva and sclera clear  ENMT: No tonsillar erythema, exudates, or enlargement; Moist mucous membranes,   NECK: Supple, No JVD  NERVOUS SYSTEM:  Alert & Oriented X3, Good concentration; generalized weakness   CHEST/LUNG: Clear to percussion bilaterally; No rales, rhonchi, wheezing, or rubs  HEART: Regular rate and rhythm;  3/6 murmur at right upper sternal border  ABDOMEN: Soft, Nontender, distended; Bowel sounds present,  colostomy bag and and anal fistula with fecal material  : bilat nephrostomies, R nephrostomy bag with blood clots , blood  EXTREMITIES:  2 -3 + edema    RECTAL: R sided hip mass with pain       SKIN: No acute rash       LABS:     stat labs at 26 Apr 2025  at 14:58  Hemoglobin /Hematocrit     6.8/21.3         then repeat                  6.0    9.33  )-----------( 276      ( 26 Apr 2025 17:01 )             18.9     04-26    138  |  110[H]  |  28[H]  ----------------------------<  94  4.1   |  22  |  0.78    Ca    7.3[L]      26 Apr 2025 14:58    TPro  5.1[L]  /  Alb  1.3[L]  /  TBili  0.6  /  DBili  x   /  AST  10[L]  /  ALT  7[L]  /  AlkPhos  93  04-25    PT/INR - ( 25 Apr 2025 23:35 )   PT: 16.8 sec;   INR: 1.45 ratio         PTT - ( 25 Apr 2025 23:35 )  PTT:37.4 sec  Urinalysis Basic - ( 26 Apr 2025 14:58 )    Color: x / Appearance: x / SG: x / pH: x  Gluc: 94 mg/dL / Ketone: x  / Bili: x / Urobili: x   Blood: x / Protein: x / Nitrite: x   Leuk Esterase: x / RBC: x / WBC x   Sq Epi: x / Non Sq Epi: x / Bacteria: x    RADIOLOGY & ADDITIONAL STUDIES:

## 2025-04-26 NOTE — H&P ADULT - PROBLEM SELECTOR PLAN 1
Please continue to monitor blood sugars  Please stay on her Coumadin for now and we will discuss this again in 1 month  Please follow-up with your cardiologist for evaluation as well as her neurologist     Please always use her walker to walk and follow up with her pain management doctor  -c/f Urosepsis  -Afebrile. WBC 12.68  -UA: Cloudy, Mod LE. Positive Nitrite, 8 WBC, Too numerous RBC  -Per daughter prior urine cx's with multidrug resistant organisms. Usually  -s/p CTX IV x1 in ED   -Urine and Blood cultures pending   -c/w CTX pending cultures -c/f Urosepsis  -Afebrile. WBC 12.68, BP 93/62  -UA: Cloudy, Mod LE. Positive Nitrite, 8 WBC, Too numerous RBC  -Per daughter prior urine cx's with multidrug resistant organisms. "Usually get Zosyn or meropenem in hospital"    -s/p CTX IV x1 in ED   -s/o midodrine 10 mg PO in ED   -Urine and Blood cultures pending   -Start Zosyn pending cultures  -Maintain active type and screen and transfuse for Hgb <7

## 2025-04-26 NOTE — H&P ADULT - NSHPREVIEWOFSYSTEMS_GEN_ALL_CORE
- CONSTITUTIONAL: Denies weight loss, fever and chills.  - HEENT: Denies changes in vision and hearing.  - RESPIRATORY: Denies SOB and cough.  - CV: Denies palpitations and CP.  - GI:  As per HPI  - : As per HPI  - MSK: Denies myalgia and joint pain.  - SKIN: Denies rash and pruritus.  - NEUROLOGICAL: Denies headache and syncope.  - PSYCHIATRIC: Denies recent changes in mood. Denies anxiety and depression.    A 12-point review of systems was completed and is otherwise negative except as noted in the HPI.

## 2025-04-26 NOTE — PROGRESS NOTE ADULT - ASSESSMENT
Assessment:  · Assessment	  59M w/ PMHx of colorectal CA s/p colostomy on hospice care, urethral stricture s/p urostomy bag, severe AS/Bicuspid AV, TAA, HTN, HLD, pAfib, sacral ulcer s/p wound bag and right hip fracture who presents to the ED with bleeding from urostomy bag. Admitted to medicine for further management and monitoring. Detailed plan as below:         Problem/Plan - 1:  ·  Problem: Painless hematuria. and acute blood loss anemia  ·  Plan: -c/f Urosepsis  -Afebrile. WBC 12.68, BP 93/62  -UA: Cloudy, Mod LE. Positive Nitrite, 8 WBC, Too numerous RBC  -Per daughter prior urine cx's with multidrug resistant organisms. "Usually get Zosyn or meropenem in hospital"    -s/p CTX IV x1 in ED   -s/o midodrine 10 mg PO in ED   -Urine and Blood cultures pending   -Start Zosyn pending cultures  -Maintain active type and screen and transfuse for Hgb <7.  - transfuse 2 units for acute blood loss anemia Hb 6.0  - check bmp in AM    Problem/Plan - 2:  ·  Problem: Colorectal cancer.   ·  Plan: ::colorectal CA s/p colostomy on hospice care  -Oncologist:: Dr Aneudy Contreras at [facility]   -Urologist: Dr Isacc Keith at [facility]  - Urology consult placed  w/Dr. Ruiz  -c/w home Morphine pump (Obtain settings) and fentanyl patch 200 mcg / 72 hours   -c/w home simethicone, famotidine, olanzapine 5 mg ODT QHS  -Hold home Augmentin ppx while on Zosyn.  - CT Abd in AM to eval further re: rectal mass and hematuria/nephrostomy location    Problem/Plan - 3:  ·  Problem: HTN (hypertension).   ·  Plan: -Currently on no home medications   -BP 93/62 on arrival   -Maintain MAP >65   -s/p midodrine 10 mg PO in ED   -Closely monitor BP.    Problem/Plan - 4:  ·  Problem: HLD (hyperlipidemia).   ·  Plan: -Continue with home atorvastatin 20 mg QHS.    Problem/Plan - 5:  ·  Problem: Paroxysmal atrial fibrillation.   ·  Plan: -Continue with home amiodarone 100 mg QD   -No anticoagulation given hematuria   -Risks and benefits discussed with patient and daughter.    Problem/Plan - 6:  ·  Problem: Sacral ulcer.   ·  Plan: -Monitor wound bag  -Wound care consultation placed.    Problem/Plan - 7:  ·  Problem: Need for prophylactic measure.   ·  Plan: VTE PPx: SCD  Nutrition: DASH/TLC Diet  Fluids: PRN  Electrolytes: Maintain K>4, Mag >2, Phos > 3  Access: PIV    Problem/Plan - 8:  ·  Problem: Need for MOLST orders   ·  Plan: MOLST form and orders   - MOLST details reviewed in detail and completed with pt, son at bedside,  and RN witness at bedside,  OLGA Yates.   Pt is DNR/DNI with NIV trial ( see detailed form)    Dispo: pending clinical improvement.

## 2025-04-27 LAB
ANION GAP SERPL CALC-SCNC: 5 MMOL/L — SIGNIFICANT CHANGE UP (ref 5–17)
BUN SERPL-MCNC: 29 MG/DL — HIGH (ref 7–23)
CALCIUM SERPL-MCNC: 7.5 MG/DL — LOW (ref 8.5–10.1)
CHLORIDE SERPL-SCNC: 108 MMOL/L — SIGNIFICANT CHANGE UP (ref 96–108)
CO2 SERPL-SCNC: 24 MMOL/L — SIGNIFICANT CHANGE UP (ref 22–31)
CREAT SERPL-MCNC: 0.86 MG/DL — SIGNIFICANT CHANGE UP (ref 0.5–1.3)
EGFR: 100 ML/MIN/1.73M2 — SIGNIFICANT CHANGE UP
EGFR: 100 ML/MIN/1.73M2 — SIGNIFICANT CHANGE UP
GLUCOSE SERPL-MCNC: 92 MG/DL — SIGNIFICANT CHANGE UP (ref 70–99)
GRAM STN FLD: ABNORMAL
HCT VFR BLD CALC: 27.1 % — LOW (ref 39–50)
HGB BLD-MCNC: 8.9 G/DL — LOW (ref 13–17)
MCHC RBC-ENTMCNC: 28.6 PG — SIGNIFICANT CHANGE UP (ref 27–34)
MCHC RBC-ENTMCNC: 32.8 G/DL — SIGNIFICANT CHANGE UP (ref 32–36)
MCV RBC AUTO: 87.1 FL — SIGNIFICANT CHANGE UP (ref 80–100)
METHOD TYPE: SIGNIFICANT CHANGE UP
NRBC BLD AUTO-RTO: 0 /100 WBCS — SIGNIFICANT CHANGE UP (ref 0–0)
P AERUGINOSA DNA BLD POS NAA+NON-PROBE: SIGNIFICANT CHANGE UP
PLATELET # BLD AUTO: 279 K/UL — SIGNIFICANT CHANGE UP (ref 150–400)
POTASSIUM SERPL-MCNC: 3.6 MMOL/L — SIGNIFICANT CHANGE UP (ref 3.5–5.3)
POTASSIUM SERPL-SCNC: 3.6 MMOL/L — SIGNIFICANT CHANGE UP (ref 3.5–5.3)
RBC # BLD: 3.11 M/UL — LOW (ref 4.2–5.8)
RBC # FLD: 18.2 % — HIGH (ref 10.3–14.5)
SODIUM SERPL-SCNC: 137 MMOL/L — SIGNIFICANT CHANGE UP (ref 135–145)
SPECIMEN SOURCE: SIGNIFICANT CHANGE UP
WBC # BLD: 11.04 K/UL — HIGH (ref 3.8–10.5)
WBC # FLD AUTO: 11.04 K/UL — HIGH (ref 3.8–10.5)

## 2025-04-27 PROCEDURE — 36000 PLACE NEEDLE IN VEIN: CPT | Mod: GW

## 2025-04-27 PROCEDURE — 99233 SBSQ HOSP IP/OBS HIGH 50: CPT | Mod: GW

## 2025-04-27 PROCEDURE — 76937 US GUIDE VASCULAR ACCESS: CPT | Mod: 26,GW

## 2025-04-27 PROCEDURE — 36410 VNPNXR 3YR/> PHY/QHP DX/THER: CPT | Mod: GW

## 2025-04-27 RX ORDER — MIDODRINE HYDROCHLORIDE 5 MG/1
5 TABLET ORAL THREE TIMES A DAY
Refills: 0 | Status: DISCONTINUED | OUTPATIENT
Start: 2025-04-27 | End: 2025-05-06

## 2025-04-27 RX ADMIN — Medication 25 GRAM(S): at 06:50

## 2025-04-27 RX ADMIN — Medication 1 APPLICATION(S): at 11:53

## 2025-04-27 RX ADMIN — Medication 30 MILLILITER(S): at 07:20

## 2025-04-27 RX ADMIN — Medication 20 MILLIGRAM(S): at 14:15

## 2025-04-27 RX ADMIN — AMIODARONE HYDROCHLORIDE 100 MILLIGRAM(S): 50 INJECTION, SOLUTION INTRAVENOUS at 06:56

## 2025-04-27 RX ADMIN — Medication 30 MILLILITER(S): at 18:19

## 2025-04-27 RX ADMIN — OLANZAPINE 5 MILLIGRAM(S): 10 TABLET ORAL at 23:04

## 2025-04-27 RX ADMIN — Medication 30 MILLILITER(S): at 19:35

## 2025-04-27 RX ADMIN — Medication 25 GRAM(S): at 23:04

## 2025-04-27 NOTE — PROGRESS NOTE ADULT - SUBJECTIVE AND OBJECTIVE BOX
Patient is a 59y old  Male who presents with a chief complaint of Hematuria  Rectal Ca with pain (26 Apr 2025 23:58)      INTERVAL HPI/OVERNIGHT EVENTS:  Pt was seen and examined, no acute events.      MEDICATIONS  (STANDING):  aMIOdarone    Tablet 100 milliGRAM(s) Oral daily  chlorhexidine 4% Liquid 1 Application(s) Topical <User Schedule>  famotidine    Tablet 20 milliGRAM(s) Oral daily  fentaNYL   Patch 100 MICROgram(s)/Hr 2 Patch Transdermal every 72 hours  morphine PCA (5 mG/mL) 30 milliLiter(s) PCA Continuous PCA Continuous  OLANZapine 5 milliGRAM(s) Oral at bedtime  OLANZapine Disintegrating Tablet 5 milliGRAM(s) Oral at bedtime  piperacillin/tazobactam IVPB.. 3.375 Gram(s) IV Intermittent every 8 hours    MEDICATIONS  (PRN):  acetaminophen     Tablet .. 650 milliGRAM(s) Oral every 6 hours PRN Temp greater or equal to 38C (100.4F), Mild Pain (1 - 3)  melatonin 3 milliGRAM(s) Oral at bedtime PRN Insomnia  simethicone 80 milliGRAM(s) Chew daily PRN Indigestion  sodium chloride 0.9% lock flush 10 milliLiter(s) IV Push every 1 hour PRN Pre/post blood products, medications, blood draw, and to maintain line patency      Allergies  No Known Allergies      Vital Signs Last 24 Hrs  T(C): 36.2 (27 Apr 2025 10:51), Max: 36.8 (27 Apr 2025 02:07)  T(F): 97.1 (27 Apr 2025 10:51), Max: 98.3 (27 Apr 2025 02:07)  HR: 96 (27 Apr 2025 10:51) (89 - 114)  BP: 92/58 (27 Apr 2025 10:51) (75/58 - 103/67)  BP(mean): --  RR: 18 (27 Apr 2025 10:51) (16 - 18)  SpO2: 97% (27 Apr 2025 10:51) (97% - 98%)    Parameters below as of 27 Apr 2025 06:53  Patient On (Oxygen Delivery Method): room air        PHYSICAL EXAM:  GENERAL:  frail, NAD  HEAD:  Atraumatic, Normocephalic  EYES: EOMI, PERRLA, conjunctiva and sclera clear  ENMT: No tonsillar erythema, exudates, or enlargement; Moist mucous membranes,   NECK: Supple, No JVD  NERVOUS SYSTEM:  Alert & Oriented X3, generalized weakness   CHEST/LUNG: Clear to percussion bilaterally; No rales, rhonchi, wheezing, or rubs  HEART: Regular rate and rhythm;  3/6 murmur at right upper sternal border  ABDOMEN: Soft, Nontender, distended; Bowel sounds present,  colostomy bag and and anal fistula with fecal material  : bilat nephrostomies, R nephrostomy bag with blood clots , blood  EXTREMITIES:  2 -3 + edema    RECTAL: R sided hip mass with pain       SKIN: No acute rash           LABS:                        8.9    11.04 )-----------( 279      ( 27 Apr 2025 12:25 )             27.1     04-27    137  |  108  |  29[H]  ----------------------------<  92  3.6   |  24  |  0.86    Ca    7.5[L]      27 Apr 2025 12:25    TPro  5.1[L]  /  Alb  1.3[L]  /  TBili  0.6  /  DBili  x   /  AST  10[L]  /  ALT  7[L]  /  AlkPhos  93  04-25    PT/INR - ( 25 Apr 2025 23:35 )   PT: 16.8 sec;   INR: 1.45 ratio         PTT - ( 25 Apr 2025 23:35 )  PTT:37.4 sec  Urinalysis Basic - ( 27 Apr 2025 12:25 )    Color: x / Appearance: x / SG: x / pH: x  Gluc: 92 mg/dL / Ketone: x  / Bili: x / Urobili: x   Blood: x / Protein: x / Nitrite: x   Leuk Esterase: x / RBC: x / WBC x   Sq Epi: x / Non Sq Epi: x / Bacteria: x      CAPILLARY BLOOD GLUCOSE          Urinalysis with Rflx Culture (collected 26 Apr 2025 03:47)      RADIOLOGY & ADDITIONAL TESTS:    Imaging Personally Reviewed:  [ ] YES  [ ] NO    Consultant(s) Notes Reviewed:  [ ] YES  [ ] NO    Care Discussed with Consultants/Other Providers [ ] YES  [ ] NO

## 2025-04-27 NOTE — PROGRESS NOTE ADULT - ASSESSMENT
59M w/ PMHx of colorectal CA s/p colostomy on hospice care, urethral stricture s/p urostomy bag, severe AS/Bicuspid AV, TAA, HTN, HLD, pAfib, sacral ulcer s/p wound bag and right hip fracture who presents to the ED with bleeding from urostomy bag. Admitted to medicine for further management and monitoring. Detailed plan as below:         Problem/Plan - 1:  ·  Problem: Painless hematuria. and acute blood loss anemia  ·  Plan: -c/f Urosepsis  -Afebrile. WBC 12.68, BP 93/62  -UA: Cloudy, Mod LE. Positive Nitrite, 8 WBC, Too numerous RBC  -Per daughter prior urine cx's with multidrug resistant organisms. "Usually get Zosyn or meropenem in hospital"    -s/p CTX IV x1 in ED   -s/o midodrine 10 mg PO in ED   -Urine and Blood cultures pending   -Started Zosyn pending cultures  -Maintain active type and screen and transfuse for Hgb <7.  - transfuse 2 units for acute blood loss anemia Hb 6.0  - Hb improved     Problem/Plan - 2:  ·  Problem: Colorectal cancer.   ·  Plan: ::colorectal CA s/p colostomy on hospice care  -Oncologist:: Dr Aneudy Contreras at [facility] [place]   -Urologist: Dr Isacc Keith at [facility]  - Urology consult placed  w/Dr. Ruiz  -c/w home Morphine pump (Obtain settings) and fentanyl patch 200 mcg / 72 hours   -c/w home simethicone, famotidine, olanzapine 5 mg ODT QHS  -Hold home Augmentin ppx while on Zosyn.  - CT Abd in  to eval further re: rectal mass and hematuria/nephrostomy location    Problem/Plan - 3:  ·  Problem: HTN (hypertension).   ·  Plan: -Currently on no home medications   -BP low  -s/p midodrine 10 mg PO in ED , will continue for now to allow room for pain med  -Closely monitor BP.    Problem/Plan - 4:  ·  Problem: HLD (hyperlipidemia).   ·  Plan: -Continue with home atorvastatin 20 mg QHS.    Problem/Plan - 5:  ·  Problem: Paroxysmal atrial fibrillation.   ·  Plan: -Continue with home amiodarone 100 mg QD   -No anticoagulation given hematuria   -Risks and benefits discussed with patient and daughter.    Problem/Plan - 6:  ·  Problem: Sacral ulcer.   ·  Plan: -Monitor wound bag  -Wound care consultation placed.    Problem/Plan - 7:  ·  Problem: Need for prophylactic measure.   ·  Plan: VTE PPx: SCD  Nutrition: DASH/TLC Diet  Fluids: PRN  Electrolytes: Maintain K>4, Mag >2, Phos > 3  Access: PIV    Pt is DNR/DNI with NIV trial ( see detailed form)    Dispo: pending clinical improvement. Tx process to [facility] in progress per family request

## 2025-04-27 NOTE — PROVIDER CONTACT NOTE (CRITICAL VALUE NOTIFICATION) - SITUATION
hgb 6.0 hematocrit 18.9
positive blood culture. growth in aerobic bottle gram negative rods
Patient hemoglobin 6.8

## 2025-04-27 NOTE — PHYSICAL THERAPY INITIAL EVALUATION ADULT - MODALITIES TREATMENT COMMENTS
Glans Penis, Stage IV Pressure Injury (pressure from edema); Scrotum, Stage II Pressure Injury, Left Ischium, Stage II Pressure Injury; Sacral wound to be assessed

## 2025-04-27 NOTE — PHYSICAL THERAPY INITIAL EVALUATION ADULT - ADDITIONAL COMMENTS
Patient lives in a house with 4-5 steps to enter. Patient has been bedbound since past 1.5 months, previously able to sit at edge of bed. Patient was receiving home hospice services, had wound RN x 3 days, private RN and family managing wounds. Patient has a hospital bed at home.

## 2025-04-28 DIAGNOSIS — G89.3 NEOPLASM RELATED PAIN (ACUTE) (CHRONIC): ICD-10-CM

## 2025-04-28 DIAGNOSIS — Z51.5 ENCOUNTER FOR PALLIATIVE CARE: ICD-10-CM

## 2025-04-28 LAB
ALBUMIN SERPL ELPH-MCNC: 1.1 G/DL — LOW (ref 3.3–5)
ALP SERPL-CCNC: 112 U/L — SIGNIFICANT CHANGE UP (ref 40–120)
ALT FLD-CCNC: 7 U/L — LOW (ref 12–78)
ANION GAP SERPL CALC-SCNC: 11 MMOL/L — SIGNIFICANT CHANGE UP (ref 5–17)
AST SERPL-CCNC: 16 U/L — SIGNIFICANT CHANGE UP (ref 15–37)
BILIRUB SERPL-MCNC: 0.8 MG/DL — SIGNIFICANT CHANGE UP (ref 0.2–1.2)
BUN SERPL-MCNC: 29 MG/DL — HIGH (ref 7–23)
CALCIUM SERPL-MCNC: 7.2 MG/DL — LOW (ref 8.5–10.1)
CHLORIDE SERPL-SCNC: 108 MMOL/L — SIGNIFICANT CHANGE UP (ref 96–108)
CO2 SERPL-SCNC: 19 MMOL/L — LOW (ref 22–31)
CREAT SERPL-MCNC: 0.85 MG/DL — SIGNIFICANT CHANGE UP (ref 0.5–1.3)
EGFR: 101 ML/MIN/1.73M2 — SIGNIFICANT CHANGE UP
EGFR: 101 ML/MIN/1.73M2 — SIGNIFICANT CHANGE UP
GLUCOSE SERPL-MCNC: 80 MG/DL — SIGNIFICANT CHANGE UP (ref 70–99)
HCT VFR BLD CALC: 27.5 % — LOW (ref 39–50)
HGB BLD-MCNC: 9.2 G/DL — LOW (ref 13–17)
MCHC RBC-ENTMCNC: 28.9 PG — SIGNIFICANT CHANGE UP (ref 27–34)
MCHC RBC-ENTMCNC: 33.5 G/DL — SIGNIFICANT CHANGE UP (ref 32–36)
MCV RBC AUTO: 86.5 FL — SIGNIFICANT CHANGE UP (ref 80–100)
NRBC BLD AUTO-RTO: 0 /100 WBCS — SIGNIFICANT CHANGE UP (ref 0–0)
PLATELET # BLD AUTO: 285 K/UL — SIGNIFICANT CHANGE UP (ref 150–400)
POTASSIUM SERPL-MCNC: 3.6 MMOL/L — SIGNIFICANT CHANGE UP (ref 3.5–5.3)
POTASSIUM SERPL-SCNC: 3.6 MMOL/L — SIGNIFICANT CHANGE UP (ref 3.5–5.3)
PROT SERPL-MCNC: 4.7 GM/DL — LOW (ref 6–8.3)
RBC # BLD: 3.18 M/UL — LOW (ref 4.2–5.8)
RBC # FLD: 18.5 % — HIGH (ref 10.3–14.5)
SODIUM SERPL-SCNC: 138 MMOL/L — SIGNIFICANT CHANGE UP (ref 135–145)
WBC # BLD: 11.88 K/UL — HIGH (ref 3.8–10.5)
WBC # FLD AUTO: 11.88 K/UL — HIGH (ref 3.8–10.5)

## 2025-04-28 PROCEDURE — 99232 SBSQ HOSP IP/OBS MODERATE 35: CPT

## 2025-04-28 PROCEDURE — 99223 1ST HOSP IP/OBS HIGH 75: CPT

## 2025-04-28 PROCEDURE — 99223 1ST HOSP IP/OBS HIGH 75: CPT | Mod: FS,GW

## 2025-04-28 PROCEDURE — G0545: CPT

## 2025-04-28 PROCEDURE — 74176 CT ABD & PELVIS W/O CONTRAST: CPT | Mod: 26,GW

## 2025-04-28 RX ORDER — DEXAMETHASONE 0.5 MG/1
4 TABLET ORAL
Refills: 0 | Status: DISCONTINUED | OUTPATIENT
Start: 2025-04-28 | End: 2025-05-02

## 2025-04-28 RX ORDER — AMIKACIN SULFATE 250 MG/ML
1000 VIAL (ML) INJECTION ONCE
Refills: 0 | Status: COMPLETED | OUTPATIENT
Start: 2025-04-28 | End: 2025-04-28

## 2025-04-28 RX ORDER — PIPERACILLIN-TAZO-DEXTROSE,ISO 2.25G/50ML
4.5 IV SOLUTION, PIGGYBACK PREMIX FROZEN(ML) INTRAVENOUS EVERY 8 HOURS
Refills: 0 | Status: DISCONTINUED | OUTPATIENT
Start: 2025-04-28 | End: 2025-04-30

## 2025-04-28 RX ORDER — SENNA 187 MG
10 TABLET ORAL AT BEDTIME
Refills: 0 | Status: DISCONTINUED | OUTPATIENT
Start: 2025-04-28 | End: 2025-05-06

## 2025-04-28 RX ADMIN — MIDODRINE HYDROCHLORIDE 5 MILLIGRAM(S): 5 TABLET ORAL at 21:38

## 2025-04-28 RX ADMIN — Medication 204 MILLIGRAM(S): at 20:46

## 2025-04-28 RX ADMIN — MIDODRINE HYDROCHLORIDE 5 MILLIGRAM(S): 5 TABLET ORAL at 06:54

## 2025-04-28 RX ADMIN — Medication 25 GRAM(S): at 06:54

## 2025-04-28 RX ADMIN — OLANZAPINE 5 MILLIGRAM(S): 10 TABLET ORAL at 21:38

## 2025-04-28 RX ADMIN — Medication 80 MILLIGRAM(S): at 10:58

## 2025-04-28 RX ADMIN — Medication 30 MILLILITER(S): at 21:32

## 2025-04-28 RX ADMIN — Medication 30 MILLILITER(S): at 14:25

## 2025-04-28 RX ADMIN — Medication 20 MILLIGRAM(S): at 10:58

## 2025-04-28 RX ADMIN — Medication 25 GRAM(S): at 16:14

## 2025-04-28 RX ADMIN — MIDODRINE HYDROCHLORIDE 5 MILLIGRAM(S): 5 TABLET ORAL at 16:14

## 2025-04-28 RX ADMIN — Medication 25 GRAM(S): at 21:28

## 2025-04-28 RX ADMIN — Medication 30 MILLILITER(S): at 17:36

## 2025-04-28 RX ADMIN — Medication 30 MILLILITER(S): at 07:18

## 2025-04-28 RX ADMIN — DEXAMETHASONE 4 MILLIGRAM(S): 0.5 TABLET ORAL at 17:37

## 2025-04-28 RX ADMIN — AMIODARONE HYDROCHLORIDE 100 MILLIGRAM(S): 50 INJECTION, SOLUTION INTRAVENOUS at 06:54

## 2025-04-28 NOTE — PROGRESS NOTE ADULT - SUBJECTIVE AND OBJECTIVE BOX
Patient is a 59y old  Male who presents with a chief complaint of Hematuria (28 Apr 2025 20:52)      INTERVAL HPI/OVERNIGHT EVENTS:  Pt was seen and examined, no acute events.      MEDICATIONS  (STANDING):  aMIOdarone    Tablet 100 milliGRAM(s) Oral daily  chlorhexidine 4% Liquid 1 Application(s) Topical <User Schedule>  dexAMETHasone  Injectable 4 milliGRAM(s) IV Push two times a day  famotidine    Tablet 20 milliGRAM(s) Oral daily  midodrine. 5 milliGRAM(s) Oral three times a day  morphine PCA (5 mG/mL) 30 milliLiter(s) PCA Continuous PCA Continuous  OLANZapine Disintegrating Tablet 5 milliGRAM(s) Oral at bedtime  piperacillin/tazobactam IVPB.. 4.5 Gram(s) IV Intermittent every 8 hours    MEDICATIONS  (PRN):  acetaminophen     Tablet .. 650 milliGRAM(s) Oral every 6 hours PRN Temp greater or equal to 38C (100.4F), Mild Pain (1 - 3)  melatonin 3 milliGRAM(s) Oral at bedtime PRN Insomnia  morphine  - Injectable 2 milliGRAM(s) IV Push every 1 hour PRN Breakthrough pain  senna Syrup 10 milliLiter(s) Oral at bedtime PRN Constipation  simethicone 80 milliGRAM(s) Chew daily PRN Indigestion  sodium chloride 0.9% lock flush 10 milliLiter(s) IV Push every 1 hour PRN Pre/post blood products, medications, blood draw, and to maintain line patency      Allergies  No Known Allergies        Vital Signs Last 24 Hrs  T(C): 36.4 (28 Apr 2025 16:02), Max: 36.4 (28 Apr 2025 16:02)  T(F): 97.6 (28 Apr 2025 16:02), Max: 97.6 (28 Apr 2025 16:02)  HR: 106 (28 Apr 2025 16:02) (86 - 106)  BP: 110/65 (28 Apr 2025 16:02) (85/55 - 110/65)  BP(mean): --  RR: 18 (28 Apr 2025 16:02) (18 - 18)  SpO2: 99% (28 Apr 2025 16:02) (94% - 99%)    Parameters below as of 28 Apr 2025 16:02  Patient On (Oxygen Delivery Method): room air        PHYSICAL EXAM:  GENERAL:  frail, NAD  HEAD:  Atraumatic, Normocephalic  EYES: EOMI, PERRLA, conjunctiva and sclera clear  ENMT: No tonsillar erythema, exudates, or enlargement; Moist mucous membranes,   NECK: Supple, No JVD  NERVOUS SYSTEM:  Alert & Oriented X3, generalized weakness   CHEST/LUNG: Clear to percussion bilaterally; No rales, rhonchi, wheezing, or rubs  HEART: RRR, S1, S2  ABDOMEN: Soft, Nontender, distended; Bowel sounds present,  colostomy bag and and anal fistula with fecal material  : bilat nephrostomies, R nephrostomy bag with blood clots , blood  EXTREMITIES:  Anasarca     LABS:                        9.2    11.88 )-----------( 285      ( 28 Apr 2025 07:15 )             27.5     04-28    138  |  108  |  29[H]  ----------------------------<  80  3.6   |  19[L]  |  0.85    Ca    7.2[L]      28 Apr 2025 07:15    TPro  4.7[L]  /  Alb  1.1[L]  /  TBili  0.8  /  DBili  x   /  AST  16  /  ALT  7[L]  /  AlkPhos  112  04-28      Urinalysis Basic - ( 28 Apr 2025 07:15 )    Color: x / Appearance: x / SG: x / pH: x  Gluc: 80 mg/dL / Ketone: x  / Bili: x / Urobili: x   Blood: x / Protein: x / Nitrite: x   Leuk Esterase: x / RBC: x / WBC x   Sq Epi: x / Non Sq Epi: x / Bacteria: x      CAPILLARY BLOOD GLUCOSE          Culture - Blood (collected 26 Apr 2025 14:58)  Source: Blood Blood-Peripheral  Preliminary Report (28 Apr 2025 02:01):    No growth at 24 hours    Culture - Blood (collected 26 Apr 2025 14:48)  Source: Blood Blood-Peripheral  Gram Stain (prelim) (27 Apr 2025 18:14):    Growth in aerobic bottle: Gram Negative Rods  Preliminary Report (28 Apr 2025 11:58):    Growth in aerobic bottle: Pseudomonas aeruginosa    Direct identification is available within approximately 3-5    hours either by Blood Panel Multiplexed PCR or Direct    MALDI-TOF. Details: [email or web address]  Organism: Blood Culture PCR (27 Apr 2025 19:27)  Organism: Blood Culture PCR (27 Apr 2025 19:27)    Culture - Urine (collected 26 Apr 2025 03:47)  Source: Clean Catch  Preliminary Report (28 Apr 2025 18:41):    >100,000 CFU/ml Gram Negative Rods    Urinalysis with Rflx Culture (collected 26 Apr 2025 03:47)      RADIOLOGY & ADDITIONAL TESTS:    Imaging Personally Reviewed:  [ ] YES  [ ] NO    Consultant(s) Notes Reviewed:  [ ] YES  [ ] NO    Care Discussed with Consultants/Other Providers [ ] YES  [ ] NO

## 2025-04-28 NOTE — CONSULT NOTE ADULT - NS ATTEND AMEND GEN_ALL_CORE FT
59M w/ PMHx of colorectal CA s/p colostomy,  venting PEG, urethral stricture s/p urostomy,  severe AS,   HTN, PAF,  sacral ulcer adm for hematuria. Foll at [facility] by onc, palliative, recently started on [facility] hospice services. [facility]OLST on file for DNR/DNI. s/p prbc.  to evaluate. On PCA, fentanyl patch for pain control at home. Agree w dc fentanyl given increasing edema, likely poor absorption, continue morphine PCA, adjustment as above. Monitor dose requirements. Steroid trial as adjunct. Bowel regimen. [place] [facility] [place]. Palliative will follow

## 2025-04-28 NOTE — CONSULT NOTE ADULT - SUBJECTIVE AND OBJECTIVE BOX
Patient is a 59y old  Male who presents with a chief complaint of Hematuria (28 Apr 2025 18:15). Further HPI obtained from daughter at bedside. She reports the hematuria was cleared this AM but began again. States the bag has been changed 3 times today all with bloody output from urostomy. Patient's daughter states patient is complaining of weakness and fatigue. Reports some pain but pain regimen is improving. Reports patient does not urinate at all from below. Has had all urologic intervention with Dr. John Keith at [facility] [phone]. Including urostomy creation and b/l retrograde nephrostomy.       HPI:  59M w/ PMHx of colorectal CA s/p colostomy on hospice care, urethral stricture s/p urostomy bag, severe AS/Bicuspid AV, TAA, HTN, HLD, pAfib, sacral ulcer s/p wound bag and right hip fracture who presents to the ED with bleeding from urostomy bag around 1830. History is obtained from daughter at bedside. Per daughter, there has been increased bleeding and clots in the urostomy bsg since yesterday. Patient has has history of multiple antibiotic resistance UTI. Patient currently denies any acute symptoms or concerns. Denies headache, dizziness, chest pain, palpitations, SOB, abdominal pain, joint pain, diarrhea/constipation, or urinary symptoms.   (26 Apr 2025 06:00)      PAST MEDICAL & SURGICAL HISTORY:  Rectal cancer    Essential hypertension    HLD (hyperlipidemia)    Chronic atrial fibrillation    H/O aortic valve stenosis    Thoracic aortic aneurysm (TAA)    Sacral ulcer    History of rectal surgery    S/P colostomy    History of urostomy      Review of Systems:  Negative except  as above in HPI    MEDICATIONS  (STANDING):  aMIOdarone    Tablet 100 milliGRAM(s) Oral daily  chlorhexidine 4% Liquid 1 Application(s) Topical <User Schedule>  dexAMETHasone  Injectable 4 milliGRAM(s) IV Push two times a day  famotidine    Tablet 20 milliGRAM(s) Oral daily  midodrine. 5 milliGRAM(s) Oral three times a day  morphine PCA (5 mG/mL) 30 milliLiter(s) PCA Continuous PCA Continuous  OLANZapine Disintegrating Tablet 5 milliGRAM(s) Oral at bedtime  piperacillin/tazobactam IVPB.. 4.5 Gram(s) IV Intermittent every 8 hours    MEDICATIONS  (PRN):  acetaminophen     Tablet .. 650 milliGRAM(s) Oral every 6 hours PRN Temp greater or equal to 38C (100.4F), Mild Pain (1 - 3)  melatonin 3 milliGRAM(s) Oral at bedtime PRN Insomnia  morphine  - Injectable 2 milliGRAM(s) IV Push every 1 hour PRN Breakthrough pain  senna Syrup 10 milliLiter(s) Oral at bedtime PRN Constipation  simethicone 80 milliGRAM(s) Chew daily PRN Indigestion  sodium chloride 0.9% lock flush 10 milliLiter(s) IV Push every 1 hour PRN Pre/post blood products, medications, blood draw, and to maintain line patency      Allergies  NKDA    Vital Signs Last 24 Hrs  T(C): 36.4 (28 Apr 2025 16:02), Max: 36.4 (28 Apr 2025 16:02)  T(F): 97.6 (28 Apr 2025 16:02), Max: 97.6 (28 Apr 2025 16:02)  HR: 106 (28 Apr 2025 16:02) (86 - 106)  BP: 110/65 (28 Apr 2025 16:02) (85/55 - 110/65)  BP(mean): --  RR: 18 (28 Apr 2025 16:02) (18 - 18)  SpO2: 99% (28 Apr 2025 16:02) (94% - 99%)    Parameters below as of 28 Apr 2025 16:02  Patient On (Oxygen Delivery Method): room air        Physical Exam:  General:  Appears stated age, lying in bed, edematous  Eyes: EOMI, conjunctiva clear  HENT:  NCAT  Chest:  respirations nonlabored  Cardiovascular:  tachycardia appreciated  Abdomen:  soft, noted bulging of abdomen, colostomy at LLQ, urostomy on RLQ with blood and clots in collection bag  Extremities:  Noted b/l UE/LE edema   Skin:  No rash    LABS:                        9.2    11.88 )-----------( 285      ( 28 Apr 2025 07:15 )             27.5     04-28    138  |  108  |  29[H]  ----------------------------<  80  3.6   |  19[L]  |  0.85    Ca    7.2[L]      28 Apr 2025 07:15    TPro  4.7[L]  /  Alb  1.1[L]  /  TBili  0.8  /  DBili  x   /  AST  16  /  ALT  7[L]  /  AlkPhos  112  04-28      Urinalysis Basic - ( 28 Apr 2025 07:15 )    Color: x / Appearance: x / SG: x / pH: x  Gluc: 80 mg/dL / Ketone: x  / Bili: x / Urobili: x   Blood: x / Protein: x / Nitrite: x   Leuk Esterase: x / RBC: x / WBC x   Sq Epi: x / Non Sq Epi: x / Bacteria: x      Culture Results:   No growth at 24 hours (04-26 @ 14:58)  Culture Results:   Growth in aerobic bottle: Pseudomonas aeruginosa  Direct identification is available within approximately 3-5  hours either by Blood Panel Multiplexed PCR or Direct  MALDI-TOF. Details: [email or web address] *!* (04-26 @ 14:48)  Culture Results:   >100,000 CFU/ml Gram Negative Rods *!* (04-26 @ 03:47)      RADIOLOGY & ADDITIONAL STUDIES:    < from: CT Abdomen and Pelvis No Cont (04.28.25 @ 13:02) >    ACC: [id number] EXAM:  CT ABDOMEN AND PELVIS   ORDERED BY: ERINN ESTRADA     PROCEDURE DATE:  04/28/2025          INTERPRETATION:  CLINICAL INFORMATION: History of rectal cancer,   right-sided mass, and bilateral nephrostomy presenting with hematuria and   clots.    COMPARISON: CT abdomen/pelvis 10/21/2016    CONTRAST/COMPLICATIONS:  IV Contrast: None  Oral Contrast: None      PROCEDURE:  CT of the Abdomen and Pelvis was performed.  Sagittal and coronal reformats were performed.    FINDINGS:  LOWER CHEST: Moderate bilateral pleural effusions with bilateral lower   lobe consolidation/atelectasis.    LIVER: Within normal limits.  BILE DUCTS: Normal caliber.  GALLBLADDER: Gallbladder wall edema, which is nonspecific.  SPLEEN: Within normal limits.  PANCREAS: Within normal limits.  ADRENALS: Within normal limits.  KIDNEYS/URETERS: Bilateral ureteral stents extending from the renal   pelves to the right lower quadrant urostomy. No hydronephrosis or   obstructive renal calculi. Intraluminal hyperdensity in the right renal   collecting system.    BLADDER: Not seen.  REPRODUCTIVE ORGANS: Metallic markers in the region of the prostate bed.    BOWEL: Percutaneous gastrostomy tube, with balloon tip in the lumen of   the stomach. Large amount of stool in the colon with left lower quadrant   colostomy.  PERITONEUM/RETROPERITONEUM: Extensive presacral soft tissue mass   encompassing the bilateral distal ureters measuring approximately 9.2 x   6.9 cm. A 7.1 x 6.3 cm right gluteal soft tissue mass (2-1 26).   Subcutaneous fluid and gas collection measuring 3.4 x 3.5 cm along the   left ischial tuberosity (2-1 25). Sacral decubitus ulcer overlying the   tip of the coccyx measuring 3.5 cm deep.  VESSELS: IVC filter. Atherosclerotic changes.  LYMPH NODES: No lymphadenopathy.  ABDOMINAL WALL: Marked diffuse anasarca.  BONES: Large soft tissue mass along the anterior pelvic wall measuring   11.5 x 6.9 cm with underlying osseous involvement of the adjacent   bilateral pubic bones and marked destruction of the right inferior pubic   ramus. There are pathologic fractures of the right superior ramus   extending into the right acetabulum and left inferior pubic ramus.    IMPRESSION:  Evaluation very limited without IV contrast as well as marked anasarca.    Extensive soft tissue masses with osseous destruction involving the   pelvis and presacral space, not well characterized without IV contrast.    Bilateral ureteral stent extending to the right lower quadrant urostomy.   Mild hyperdensity within the right renal collecting system, which could   represent blood products. No hydronephrosis or obstructive stone/mass   seen.        --- End of Report ---            ANIL BUSBY MD  This document has been electronically signed. Apr 28 2025 2:56PM    < end of copied text >

## 2025-04-28 NOTE — PROGRESS NOTE ADULT - ASSESSMENT
59M w/ PMHx of colorectal CA s/p colostomy on hospice care, urethral stricture s/p urostomy bag, severe AS/Bicuspid AV, TAA, HTN, HLD, pAfib, sacral ulcer s/p wound bag and right hip fracture who presents to the ED with bleeding from urostomy bag. Admitted to medicine for further management and monitoring. Detailed plan as below:       Painless hematuria. and acute blood loss anemia:  Possibly 2/2 UTI vs invasive malignancy   CT with no stone, no hydro, stent in place  Sepsis POA  -Urine Clx with GNR, and Blood cultures with pseudomonas  -On Zosyn, dose increased , one dose amikacin given per ID  - transfused 2 units for acute blood loss anemia Hb 6.0  - Hb improved   - Hematuria recurred  - CT noted: Extensive invasive cancer with severe anasarca.   - ID and urology on board      Malignant Colorectal cancer:  colorectal CA s/p colostomy on hospice care  -Oncologist:: Dr Aneudy Contreras at [facility] [place]   -Urologist: Dr Isacc Keith at [facility] [place]  -c/w home Morphine pump ( started here at lower dose) and fentanyl patch 200 mcg / 72 hours   -c/w home simethicone, famotidine, olanzapine 5 mg ODT QHS      Hypotension:  -s/p midodrine 10 mg PO in ED , will continue for now to allow room for pain med  -Closely monitor BP.    HLD:  Continue with home atorvastatin 20 mg QHS.    Paroxysmal atrial fibrillation.   -Continue with home amiodarone 100 mg QD   -No anticoagulation given hematuria       Sacral ulcer.   -Monitor wound bag  -Wound care consultation placed.    VTE PPx: SCD  Nutrition: DASH/TLC Diet  Fluids: PRN  Electrolytes: Maintain K>4, Mag >2, Phos > 3  Access: PIV    Pt is DNR/DNI with NIV trial ( see detailed form)    Dispo: pending clinical improvement. Tx process to [facility] [place] in progress per family request , Awaiting acceptance, Multiple doctors including myself and ACP been in touch with [facility] [place] onc team.     palliative consulted for assistance with pain management.    Discussed at length with family at bedside. prognosis poor.

## 2025-04-28 NOTE — DIETITIAN INITIAL EVALUATION ADULT - PERTINENT MEDS FT
MEDICATIONS  (STANDING):  aMIOdarone    Tablet 100 milliGRAM(s) Oral daily  chlorhexidine 4% Liquid 1 Application(s) Topical <User Schedule>  famotidine    Tablet 20 milliGRAM(s) Oral daily  fentaNYL   Patch 100 MICROgram(s)/Hr 2 Patch Transdermal every 72 hours  midodrine. 5 milliGRAM(s) Oral three times a day  morphine PCA (5 mG/mL) 30 milliLiter(s) PCA Continuous PCA Continuous  OLANZapine 5 milliGRAM(s) Oral at bedtime  OLANZapine Disintegrating Tablet 5 milliGRAM(s) Oral at bedtime  piperacillin/tazobactam IVPB.. 3.375 Gram(s) IV Intermittent every 8 hours    MEDICATIONS  (PRN):  acetaminophen     Tablet .. 650 milliGRAM(s) Oral every 6 hours PRN Temp greater or equal to 38C (100.4F), Mild Pain (1 - 3)  melatonin 3 milliGRAM(s) Oral at bedtime PRN Insomnia  simethicone 80 milliGRAM(s) Chew daily PRN Indigestion  sodium chloride 0.9% lock flush 10 milliLiter(s) IV Push every 1 hour PRN Pre/post blood products, medications, blood draw, and to maintain line patency

## 2025-04-28 NOTE — CONSULT NOTE ADULT - SUBJECTIVE AND OBJECTIVE BOX
[facility]  INFECTIOUS DISEASES   [address] [place] [address]  Tel: [phone]     Fax: [phone]  ==============================================================================  DO Angela Hernandez MD Sehrish Shahid, MD Alexandra Gutman, NP   ==============================================================================    KENTRELL CLARK  MRN-[id number]  Male  59y ([birth year])        Patient is a 59y old  Male who presents with a chief complaint of Hematuria (28 Apr 2025 21:33)      HPI:  59M w/ PMHx of colorectal CA s/p colostomy on hospice care, urethral stricture s/p urostomy bag, severe AS/Bicuspid AV, TAA, HTN, HLD, pAfib, sacral ulcer s/p wound bag and right hip fracture who presents to the ED with bleeding from urostomy bag around 1830. History is obtained from daughter at bedside. Per daughter, there has been increased bleeding and clots in the urostomy bsg since yesterday. Patient has has history of multiple antibiotic resistance UTI. Patient currently denies any acute symptoms or concerns. Denies headache, dizziness, chest pain, palpitations, SOB, abdominal pain, joint pain, diarrhea/constipation, or urinary symptoms.   (26 Apr 2025 06:00)      ID consulted for workup and antibiotic management     PAST MEDICAL & SURGICAL HISTORY:  Rectal cancer      Essential hypertension      HLD (hyperlipidemia)      Chronic atrial fibrillation      H/O aortic valve stenosis      Thoracic aortic aneurysm (TAA)      Sacral ulcer      History of rectal surgery      S/P colostomy      History of urostomy          Allergies  No Known Allergies        ANTIMICROBIALS:  piperacillin/tazobactam IVPB.. 4.5 every 8 hours      MEDICATIONS  (STANDING):  amikacin  IVPB   204 mL/Hr IV Intermittent (04-28-25 @ 20:46)    cefTRIAXone   IVPB   100 mL/Hr IV Intermittent (04-26-25 @ 04:41)    piperacillin/tazobactam IVPB.   200 mL/Hr IV Intermittent (04-26-25 @ 06:33)    piperacillin/tazobactam IVPB.-   25 mL/Hr IV Intermittent (04-26-25 @ 12:34)    piperacillin/tazobactam IVPB.-   25 mL/Hr IV Intermittent (04-26-25 @ 18:44)    piperacillin/tazobactam IVPB..   25 mL/Hr IV Intermittent (04-28-25 @ 06:54)   25 mL/Hr IV Intermittent (04-27-25 @ 23:04)   25 mL/Hr IV Intermittent (04-27-25 @ 06:50)    piperacillin/tazobactam IVPB..   25 mL/Hr IV Intermittent (04-28-25 @ 21:28)   25 mL/Hr IV Intermittent (04-28-25 @ 16:14)        OTHER MEDS: MEDICATIONS  (STANDING):  acetaminophen     Tablet .. 650 every 6 hours PRN  aMIOdarone    Tablet 100 daily  dexAMETHasone  Injectable 4 two times a day  famotidine    Tablet 20 daily  melatonin 3 at bedtime PRN  midodrine. 5 three times a day  morphine  - Injectable 2 every 1 hour PRN  morphine PCA (5 mG/mL) 30 PCA Continuous  OLANZapine Disintegrating Tablet 5 at bedtime  senna Syrup 10 at bedtime PRN  simethicone 80 daily PRN      SOCIAL HISTORY:     Smoking Cigarettes [ ]Active [ ] Former [ ]Denies   ETOH [ ]denies [ ]Former [ ]Current Use denies   Drug Use [ ]Never [ ] Former [ ] Active     FAMILY HISTORY:      REVIEW OF SYSTEMS  [  ] ROS unobtainable because:    [  ] All other systems negative except as noted below:	    Constitutional:  [ ] fever [ ] chills  [ ] weight loss  [ ] weakness  Skin:  [ ] rash [ ] phlebitis	  Eyes: [ ] icterus [ ] pain  [ ] discharge	  ENMT: [ ] sore throat  [ ] thrush [ ] ulcers [ ] exudates  Respiratory: [ ] dyspnea [ ] hemoptysis [ ] cough [ ] sputum	  Cardiovascular:  [ ] chest pain [ ] palpitations [ ] edema	  Gastrointestinal:  [ ] nausea [ ] vomiting [ ] diarrhea [ ] constipation [ ] pain	  Genitourinary:  [ ] dysuria [ ] frequency [ ] hematuria [ ] discharge [ ] flank pain  [ ] incontinence  Musculoskeletal:  [ ] myalgias [ ] arthralgias [ ] arthritis  [ ] back pain  Neurological:  [ ] headache [ ] seizures  [ ] confusion/altered mental status  Psychiatric:  [ ] anxiety [ ] depression	  Hematology/Lymphatics:  [ ] lymphadenopathy  Endocrine:  [ ] adrenal [ ] thyroid  Allergic/Immunologic:	 [ ] transplant [ ] seasonal    Vital Signs Last 24 Hrs  T(F): 97.6 (04-28-25 @ 16:02), Max: 98.3 (04-27-25 @ 02:07)    Vital Signs Last 24 Hrs  HR: 84 (04-28-25 @ 21:30) (84 - 106)  BP: 110/70 (04-28-25 @ 21:30) (85/55 - 110/70)  RR: 18 (04-28-25 @ 16:02)  SpO2: 99% (04-28-25 @ 16:02) (94% - 99%)  Wt(kg): --    PHYSICAL EXAM:  Constitutional: non-toxic, no distress  HEAD/EYES: anicteric, no conjunctival injection  ENT:  supple, no thrush  Cardiovascular:   normal S1, S2, no murmur, no edema  Respiratory:  clear BS bilaterally, no wheezes, no rales  GI:  soft, non-tender, normal bowel sounds  :  no devine, no CVA tenderness  Musculoskeletal:  no synovitis, normal ROM  Neurologic: awake and alert, normal strength, no focal findings  Skin:  no rash, no erythema, no phlebitis  Heme/Onc: no lymphadenopathy   Psychiatric:  awake, alert, appropriate mood        WBC  WBC Count: 11.88 K/uL (04-28 @ 07:15)  WBC Count: 11.04 K/uL (04-27 @ 12:25)  WBC Count: 9.33 K/uL (04-26 @ 17:01)  WBC Count: 10.72 K/uL (04-26 @ 14:58)  WBC Count: 12.68 K/uL (04-25 @ 23:35)        CBC                        9.2    11.88 )-----------( 285      ( 28 Apr 2025 07:15 )             27.5     BMP/CMP  04-28    138  |  108  |  29[H]  ----------------------------<  80  3.6   |  19[L]  |  0.85    Ca    7.2[L]      28 Apr 2025 07:15    TPro  4.7[L]  /  Alb  1.1[L]  /  TBili  0.8  /  DBili  x   /  AST  16  /  ALT  7[L]  /  AlkPhos  112  04-28    Creatinine Trend  Creatinine Trend: 0.85<--, 0.86<--, 0.78<--, 0.92<--                  MICROBIOLOGY:  Blood Blood-Peripheral  04-26-25   No growth at 24 hours  --  --      Blood Blood-Peripheral  04-26-25   Growth in aerobic bottle: Pseudomonas aeruginosa  Direct identification is available within approximately 3-5  hours either by Blood Panel Multiplexed PCR or Direct  MALDI-TOF. Details: [email or web address]  --  Blood Culture PCR      Clean Catch  04-26-25   >100,000 CFU/ml Pseudomonas aeruginosa  --  --    RADIOLOGY:  < from: CT Abdomen and Pelvis No Cont (04.28.25 @ 13:02) >  IMPRESSION:  Evaluation very limited without IV contrast as well as marked anasarca.    Extensive soft tissue masses with osseous destruction involving the   pelvis and presacral space, not well characterized without IV contrast.    Bilateral ureteral stent extending to the right lower quadrant urostomy.   Mild hyperdensity within the right renal collecting system, which could   represent blood products. No hydronephrosis or obstructive stone/mass   seen.      ANIL BUSBY MD  This document has been electronically signed. Apr 28 2025 2:56PM    < end of copied text >    I have personally reviewed the above imaging  [facility]  INFECTIOUS DISEASES   [address] [place] [address]  Tel: [phone]     Fax: [phone]  ==============================================================================  DO Angela Hernandez MD Sehrish Shahid, MD Alexandra Gutman, NP   ==============================================================================    KENTRELL CLARK  MRN-[id number]  Male  59y ([birth year])        Patient is a 59y old  Male who presents with a chief complaint of Hematuria (28 Apr 2025 21:33)      HPI:  59M w/ PMHx of colorectal CA s/p colostomy on hospice care, urethral stricture s/p urostomy bag, severe AS/Bicuspid AV, TAA, HTN, HLD, pAfib, sacral ulcer s/p wound bag and right hip fracture who presents to the ED with bleeding from urostomy bag around 1830. History is obtained from daughter at bedside. Per daughter, there has been increased bleeding and clots in the urostomy bsg since yesterday. Patient has has history of multiple antibiotic resistance UTI. Patient currently denies any acute symptoms or concerns. Denies headache, dizziness, chest pain, palpitations, SOB, abdominal pain, joint pain, diarrhea/constipation, or urinary symptoms.   (26 Apr 2025 06:00)    Patient was admitted to [facility] for sepsis in March , found to have enterococcus bacteremia and possibly endocarditis. chemoport was kept in.   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 4.5 every 8 hours      MEDICATIONS  (STANDING):  amikacin  IVPB   204 mL/Hr IV Intermittent (04-28-25 @ 20:46)    cefTRIAXone   IVPB   100 mL/Hr IV Intermittent (04-26-25 @ 04:41)    piperacillin/tazobactam IVPB.   200 mL/Hr IV Intermittent (04-26-25 @ 06:33)    piperacillin/tazobactam IVPB.-   25 mL/Hr IV Intermittent (04-26-25 @ 12:34)    piperacillin/tazobactam IVPB.-   25 mL/Hr IV Intermittent (04-26-25 @ 18:44)    piperacillin/tazobactam IVPB..   25 mL/Hr IV Intermittent (04-28-25 @ 06:54)   25 mL/Hr IV Intermittent (04-27-25 @ 23:04)   25 mL/Hr IV Intermittent (04-27-25 @ 06:50)    piperacillin/tazobactam IVPB..   25 mL/Hr IV Intermittent (04-28-25 @ 21:28)   25 mL/Hr IV Intermittent (04-28-25 @ 16:14)        OTHER MEDS: MEDICATIONS  (STANDING):  acetaminophen     Tablet .. 650 every 6 hours PRN  aMIOdarone    Tablet 100 daily  dexAMETHasone  Injectable 4 two times a day  famotidine    Tablet 20 daily  melatonin 3 at bedtime PRN  midodrine. 5 three times a day  morphine  - Injectable 2 every 1 hour PRN  morphine PCA (5 mG/mL) 30 PCA Continuous  OLANZapine Disintegrating Tablet 5 at bedtime  senna Syrup 10 at bedtime PRN  simethicone 80 daily PRN      SOCIAL HISTORY:     no toxic habits     FAMILY HISTORY:  noncontributory     REVIEW OF SYSTEMS  [  ] ROS unobtainable because:    [ x ] All other systems negative except as noted below:	    Constitutional:  [ ] fever [ x] chills  [ ] weight loss  [ x] weakness  Skin:  [ ] rash [ ] phlebitis	  Eyes: [ ] icterus [ ] pain  [ ] discharge	  ENMT: [ ] sore throat  [ ] thrush [ ] ulcers [ ] exudates  Respiratory: [ ] dyspnea [ ] hemoptysis [ ] cough [ ] sputum	  Cardiovascular:  [ ] chest pain [ ] palpitations [ ] edema	  Gastrointestinal:  [ ] nausea [ ] vomiting [ ] diarrhea [ ] constipation [ x] pain	  Genitourinary:  [ ] dysuria [ ] frequency [x ] hematuria [ ] discharge [ ] flank pain  [ ] incontinence  Musculoskeletal:  [ ] myalgias [ ] arthralgias [ ] arthritis  [ ] back pain  Neurological:  [ ] headache [ ] seizures  [ ] confusion/altered mental status  Psychiatric:  [ ] anxiety [ ] depression	  Hematology/Lymphatics:  [ ] lymphadenopathy  Endocrine:  [ ] adrenal [ ] thyroid  Allergic/Immunologic:	 [ ] transplant [ ] seasonal    Vital Signs Last 24 Hrs  T(F): 97.6 (04-28-25 @ 16:02), Max: 98.3 (04-27-25 @ 02:07)    Vital Signs Last 24 Hrs  HR: 84 (04-28-25 @ 21:30) (84 - 106)  BP: 110/70 (04-28-25 @ 21:30) (85/55 - 110/70)  RR: 18 (04-28-25 @ 16:02)  SpO2: 99% (04-28-25 @ 16:02) (94% - 99%)  Wt(kg): --    PHYSICAL EXAM:  Constitutional: ill appearing male   HEAD/EYES: anicteric, no conjunctival injection, pale appearing   ENT:  supple, no thrush  Cardiovascular:   normal S1, S2, no murmur, full body anasarca   Respiratory:  clear BS bilaterally, no wheezes, no rales  GI:  soft, tender, good bowel sounds, left ostomy, right urostomy with blood in bag, several surgical scars on abdomen   :  no devine, urostomy with blood  Musculoskeletal:  very minimal movement of extremities   Neurologic: awake and alert, unable to assess strength  Skin:  no rash, no erythema, no phlebitis, pictures of sacral wound stage 4, very deep, right chest chemoport access without erythema or induration or tenderness     Psychiatric:  awake, alert, appropriate mood        WBC  WBC Count: 11.88 K/uL (04-28 @ 07:15)  WBC Count: 11.04 K/uL (04-27 @ 12:25)  WBC Count: 9.33 K/uL (04-26 @ 17:01)  WBC Count: 10.72 K/uL (04-26 @ 14:58)  WBC Count: 12.68 K/uL (04-25 @ 23:35)        CBC                        9.2    11.88 )-----------( 285      ( 28 Apr 2025 07:15 )             27.5     BMP/CMP  04-28    138  |  108  |  29[H]  ----------------------------<  80  3.6   |  19[L]  |  0.85    Ca    7.2[L]      28 Apr 2025 07:15    TPro  4.7[L]  /  Alb  1.1[L]  /  TBili  0.8  /  DBili  x   /  AST  16  /  ALT  7[L]  /  AlkPhos  112  04-28    Creatinine Trend  Creatinine Trend: 0.85<--, 0.86<--, 0.78<--, 0.92<--    MICROBIOLOGY:  Blood Blood-Peripheral  04-26-25   No growth at 24 hours  --  --      Blood Blood-Peripheral  04-26-25   Growth in aerobic bottle: Pseudomonas aeruginosa  Direct identification is available within approximately 3-5  hours either by Blood Panel Multiplexed PCR or Direct  MALDI-TOF. Details: [email or web address]  --  Blood Culture PCR      Clean Catch  04-26-25   >100,000 CFU/ml Pseudomonas aeruginosa  --  --    RADIOLOGY:  < from: CT Abdomen and Pelvis No Cont (04.28.25 @ 13:02) >  IMPRESSION:  Evaluation very limited without IV contrast as well as marked anasarca.    Extensive soft tissue masses with osseous destruction involving the   pelvis and presacral space, not well characterized without IV contrast.    Bilateral ureteral stent extending to the right lower quadrant urostomy.   Mild hyperdensity within the right renal collecting system, which could   represent blood products. No hydronephrosis or obstructive stone/mass   seen.      ANIL BUSBY MD  This document has been electronically signed. Apr 28 2025 2:56PM    < end of copied text >    I have personally reviewed the above imaging

## 2025-04-28 NOTE — CONSULT NOTE ADULT - ASSESSMENT
59M w/ PMHx colorectal CA s/p colostomy creation, urethral stricture s/p urostomy bag and b/l retrograde nephrostomy, severe AS/bicuspid AV, TAA, HTN, HLD, pAF, sacral ulcer s/p wound bag presented with hematuria in urostomy bag.  Tachycardic, afebrile, leukocytosis to 11.8, BUN/Cr 29/.85. Prelim BCx pseudomonas, prelim UCx GNR.  CT revealed b/l ureteral stent extending to urostomy, mild hyperdensity of R renal collecting system, likely blood products, no hydronephrosis or obstruction.    Plan as discussed with Dr. Little:  - No acute urologic intervention at this time  - please obtain records from [facility] urologist, Dr. John Keith  - trend H/H, transfuse PRN  - Maintain hydration  - continue antibiotics  - continue care per primary team 59M w/ PMHx colorectal CA s/p colostomy creation, urethral stricture s/p urostomy bag and b/l retrograde nephrostomy, severe AS/bicuspid AV, TAA, HTN, HLD, pAF, sacral ulcer s/p wound bag presented with hematuria in urostomy bag.  Tachycardic, afebrile, leukocytosis to 11.8, BUN/Cr 29/.85. Prelim BCx pseudomonas, prelim UCx GNR.  CT revealed b/l ureteral stent extending to urostomy, mild hyperdensity of R renal collecting system, likely blood products, no hydronephrosis or obstruction.    Plan as discussed with Dr. Little:  - No acute urologic intervention at this time  - please obtain records from [facility] urologist, Dr. John Keith  - recommend CT abdomen/pelvis w/ IV contrast including renal arterial phase, venous phase, urogram phase  - trend H/H, transfuse PRN  - Maintain hydration  - continue antibiotics  - continue care per primary team

## 2025-04-28 NOTE — CONSULT NOTE ADULT - PROBLEM SELECTOR RECOMMENDATION 3
Pt had been managed on Fentanyl 200 mcg TD Q 72  but as he is becoming more anasarcic,   Fentanyl is likely no longer effective. It is unsure as to whether pt has any effect from the Fentanyl at this time Pt po route is no longer a reliable route as ventng PEG often needs to be clamped.  Pt has known sensitivity to higher doses of opioids in the past, so it is best to start lower and titrate rapidly to comfort.     Pt pain management is now  limited to IV opioid administration or use of intensols.   Spoke with JERRELL Tan NP at [facility] and collaborated on the following regimen:  PCA Morphine 5 mg/5ml  2 mg IV basal rate   4 mg iV Q 10 min bolus rate  Dexamethasone 4 mg IV BID for neuropathic component of pain  to be administered at 6A & 2PM to avoid insomnia. First dose may be given stat    Colostomy is putting out good amts of stool, continue to judiciously  observe for OIC. May try Senna liquid 2 tsp QHS daily prn if needed and tolerated.

## 2025-04-28 NOTE — CONSULT NOTE ADULT - PROBLEM SELECTOR RECOMMENDATION 9
Dx in 2018, under the care of oncologist  Dr Briggs at [facility], followed by palliative Lachelle Tan NP for pain management and support Dx in 2018, under the care of onc[place]  [place] [facility] by palliative Lachelle Tan NP for pain management and support.  S/P chemo, surgery  Enrolled in [facility] hosp[facility] program 5 days ago to align with pt need for IV opioid management  and pt and family wish for pt to remain at home.  Daughter called 911 when hematuria was seen, EMT would not take to [facility] as per family request, [facility] was closest hospital.

## 2025-04-28 NOTE — CONSULT NOTE ADULT - PROBLEM SELECTOR RECOMMENDATION 4
Met with daughter Rowan who provided hx. She was hoping that pt could be transferred to [facility]. for further workup  Per Ms Tan, hospital is full, over capacity so transfer denied as pt needs can be well managed  at present facility. Strongly suggested that pt reenroll in hospice as need to manage pain will be paramount in next days/weeks as pt approaches death. She understands that pt will need close monitoring, She was hoping that Dr Disla would have "just accepted him to [facility] one more time to see if there was anything else that could be done" Ms Tan to call family this evening to further support need for hospice. Would refer to [facility] as inpt care will likely be needed in the very near future and is closest for family to get to    Discussed with BÁRBARA CORRALES

## 2025-04-28 NOTE — DIETITIAN INITIAL EVALUATION ADULT - PROBLEM SELECTOR PLAN 1
-c/f Urosepsis  -Afebrile. WBC 12.68, BP 93/62  -UA: Cloudy, Mod LE. Positive Nitrite, 8 WBC, Too numerous RBC  -Per daughter prior urine cx's with multidrug resistant organisms. "Usually get Zosyn or meropenem in hospital"    -s/p CTX IV x1 in ED   -s/o midodrine 10 mg PO in ED   -Urine and Blood cultures pending   -Start Zosyn pending cultures  -Maintain active type and screen and transfuse for Hgb <7

## 2025-04-28 NOTE — DIETITIAN INITIAL EVALUATION ADULT - NSFNSPHYEXAMSKINFT_GEN_A_CORE
Pressure Injury 1: scrotum, posterior aspect, Stage II  Pressure Injury 2: Left:, ischium, Stage II  Pressure Injury 3: penis, Stage IV  Pressure Injury 4: sacrum Stage III

## 2025-04-28 NOTE — CONSULT NOTE ADULT - ASSESSMENT
59M w/ PMHx of colorectal CA s/p colostomy,  venting PEG, s/p urostomy bag, severe AS/Bicuspid AV, TAA, HTN, HLD, pAfib, sacral ulcer s/p wound bag and B/L  hip fracture who presents to the ED with bleeding from urostomy bag. Pt was   admitted to [facility] 5 days  for home hospice  and was started on PCA Morphine for increased pain. Pt  presents to the ED with bleeding from urostomy bag. 59M w/ PMHx of colorectal CA s/p colostomy,  venting PEG, s/p urostomy bag, severe AS/Bicuspid AV, TAA, HTN, HLD, pAfib, sacral ulcer s/p wound bag and B/L  hip fracture who presents to the ED with bleeding from urostomy bag. Pt was   admitted to [facility] 5 days  for home hospice  and was started on PCA Morphine for increased pain. Pt  presents to the ED with bleeding from urostomy bag.

## 2025-04-28 NOTE — DIETITIAN INITIAL EVALUATION ADULT - OTHER INFO
-likely due to decompensated alcoholic hepatitis  -will rule out infectious etiology given rising WCC and organ dysfunction   -follow up bl cx/urine cx/cxr/abdo U/S   -broad spectrum abx (vanc and zosyn) to cover for hospital acquired infection (given multiple admission to hospital) and intra-abdominal etiologies, de-escalate abx as indicated   -evaluated abdomen for fluid pocket for paracentesis at bedside with patient supine and rolled on side; however no safe pocket visualized with ultrasound   -consider IR para in AM  -closely monitor liver enzymes  -MELD-Na score: 35 at 9/27/2017 11:40 PM  MELD score: 34 at 9/27/2017 11:40 PM  Calculated from:  Serum Creatinine: 2.6 mg/dL at 9/27/2017 11:40 PM  Serum Sodium: 133 mmol/L at 9/27/2017 11:40 PM  Total Bilirubin: 30.0 mg/dL at 9/27/2017 11:40 PM  INR(ratio): 1.6 at 9/27/2017 11:40 PM  Age: 29 years  -Maddrey's discriminatory function was 43, will hold off on steroids now as work up for active infection underway.   -follow up hepatitis panel  -OSH workup: negative jae ab, ceruloplasmin, and sm muscle ab; imaging at OSH notable for hepatosplenomegaly (although no CDs were sent for review)  -will consult hepatology for further recommendations/liver transplant workup   Unable to interview pt due to medical condition; pt c severe pain due to colorectal cancer (s/p colostomy & urostomy); follows as outpatient @ [facility]. Per medical record pt lives c son, daughter, & spouse; is dependent for care; on home hospice through [place]; receives support services at home. Pt presented c hematuria (blood from urostomy bag); s/p blood transfusion; pt c hx of AB resistant UTI's. Pt on morphine pump at home to control pain; hospice MD asked it be continued during hospitalization. No reports of any N/V/C/D or chew/swallowing difficulty; will change to Easy to Chew consistency for ease of eating; no diet restrictions c addition of nutritional supplement.    Unable to interview pt due to medical condition; pt c severe pain due to colorectal cancer (s/p colostomy & urostomy); follows as outpatient @ [facility]. Per medical record pt lives c son, daughter, & spouse; is dependent for care; on home hospice through [place]; receives support services at home; DNR/I status. Pt presented c hematuria (blood from urostomy bag); s/p blood transfusion; pt c hx of AB resistant UTI's. Pt on morphine pump at home to control pain; hospice MD asked it be continued during hospitalization. No reports of any N/V/C/D or chew/swallowing difficulty; will change to Easy to Chew consistency for ease of eating; no diet restrictions c addition of nutritional supplement.    Unable to interview pt due to medical condition; pt c severe pain due to colorectal cancer (s/p colostomy & urostomy); follows as outpatient @ [facility]; waiting for transfer there. Per medical record pt lives c son, daughter, & spouse; is dependent for care; on home hospice through [place]; receives support services at home; DNR/I status. Pt presented c hematuria (blood from urostomy bag); s/p blood transfusion; pt c hx of AB resistant UTI's. Pt on morphine pump at home to control pain; hospice MD asked it be continued during hospitalization. No reports of any N/V/C/D or chew/swallowing difficulty; will change to Easy to Chew consistency for ease of eating; no diet restrictions c addition of nutritional supplement.

## 2025-04-28 NOTE — DIETITIAN INITIAL EVALUATION ADULT - PROBLEM SELECTOR PLAN 2
::colorectal CA s/p colostomy on hospice care  -Oncologist:: Dr Aneudy Contreras at [facility]   -Urologist: Dr Isacc Keith at [facility]  -c/w home Morphine pump (Obtain settings) and fentanyl patch 200 mcg / 72 hours   -c/w home simethicone, famotidine, olanzapine 5 mg ODT QHS  -Hold home Augmentin ppx while on Zosyn

## 2025-04-28 NOTE — CONSULT NOTE ADULT - PROBLEM SELECTOR RECOMMENDATION 2
admitted to [facility] with hematuria as observed in his urostomy bag HGB 7.6, dropped to 6 the following morning  S/P 3 units PRBC's, temporary halt to hematuria  Further hematuria began today   CT: Evaluation very limited without IV contrast as well as marked anasarca.  Extensive soft tissue masses with osseous destruction involving the   pelvis and presacral space, not well characterized without IV contrast.  Bilateral ureteral stent extending to the right lower quadrant urostomy.   Mild hypodensity within the right renal collecting system, which could   represent blood products. No hydronephrosis or obstructive stone/mass   seen.  Would not recommend further transfusions as pt is EOL and transfusions will likely cause more burden than benefit at this time

## 2025-04-28 NOTE — CONSULT NOTE ADULT - ASSESSMENT
59M w/ PMHx of colorectal CA s/p colostomy on hospice care, urethral stricture s/p urostomy bag, severe AS/Bicuspid AV, TAA, HTN, HLD, pAfib, sacral ulcer s/p wound bag and right hip fracture who presents to the ED with bleeding from urostomy bag around 1830. History is obtained from daughter at bedside. Per daughter, there has been increased bleeding and clots in the urostomy bsg since yesterday. Patient has has history of multiple antibiotic resistance UTI. Patient currently denies any acute symptoms or concerns. Denies headache, dizziness, chest pain, palpitations, SOB, abdominal pain, joint pain, diarrhea/constipation, or urinary symptoms.  Patient was admitted to [facility] for sepsis in March , found to have enterococcus bacteremia and possibly endocarditis. chemoport was kept in.   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 Shandra Denney, DO  Chief, Infectious Disease at [facility]  Reachable via [employer] Teams or [place] office: [phone]  Weekdays: After 5pm, please call [phone] for all inquiries and new consults  Weekends: Message on-call infectious disease physician via teams (see Ange)

## 2025-04-28 NOTE — CONSULT NOTE ADULT - SUBJECTIVE AND OBJECTIVE BOX
KENTRELL CLAKR          MRN-[id number]           :1965    HPI:  59M w/ PMHx of colorectal CA s/p colostomy,  venting PEG, s/p urostomy bag, severe AS/Bicuspid AV, TAA, HTN, HLD, pAfib, sacral ulcer s/p wound bag and B/L  hip fracture who presents to the ED with bleeding from urostomy bag. Pt was  recently admitted to [facility] for home hospice  and was started on PCA Morphine for increased pain. Pt  presents to the ED with bleeding from urostomy bag. History is obtained from daughter at bedside. Per daughter, there has been increased bleeding and clots in the urostomy dsg since yesterday. Patient has has history of multiple antibiotic resistance UTI. Patient currently denies any acute symptoms or concerns. Denies headache, dizziness, chest pain, palpitations, SOB, abdominal pain, joint pain, diarrhea/constipation, or urinary symptoms.   (2025 06:00)    Pt arrived with hgb 7.4, dropped to 6 on  s/p 3 units PRBCS    PAST MEDICAL & SURGICAL HISTORY:  Colorectal cancer  Essential hypertension  HLD (hyperlipidemia)  Chronic atrial fibrillation  H/O aortic valve stenosis  Thoracic aortic aneurysm (TAA)  Sacral ulcer  History of rectal surgery  Colostomy  Urostomy  Venting PEG    FAMILY HISTORY:   Reviewed and found non contributory in mother or father    SOCIAL HISTORY: , 2 children,    ROS:    Unable to attain due to:                      Dyspnea (Jed 0-10): 0                       N/V (Y/N):   N                           Secretions (Y/N) :  N              Agitation(Y/N): N  Pain (Y/N): Y   patient  with c/o sharp, shooting, burning pain originating at left hip radiating down his leg. Presently a 7/10 on pain scale, relieved to 4/10 with PCA bolus dose. Exacerbated with movement.  Also with intermittent diffuse pain in abdomen, not an issue at time of assessment. pt sates that he wishes pain could be better managed      Other review of systems neg    Allergies    No Known Allergies    Intolerances    Opiate Naive (Y/N): N  -iStop reviewed (Y/N): Yes. No Rx found on iStop review  | Reference #: [id number]      PDI	My Rx	Current Rx	Drug Type	Rx Written	Rx Dispensed	Drug	Quantity	Days Supply	Prescriber Name	Prescriber CHITO #	Payment Method	Dispenser  A	N	N	O	2025	oxycodone hcl (ir) 10 mg tab	270	15	Soila Claudio NIRALI	[id number]	Medicaid	[id number]  A	N	N	O	2025	fentanyl 25 mcg/hr patch	10	30	Britney, Lachelle S	[id number]	Medicaid	[id number]  A	N	N		2024	dronabinol 2.5 mg capsule	60	30	Britney, Lachelle S	[id number]	Medicaid	[id number]  A	N	N	O	2024	fentanyl 75 mcg/hr patch	10	30	Britney, Lachelle S	[id number]	Medicaid	[id number]  A	N	N	O	2024	oxycodone hcl (ir) 10 mg tab	270	15	Britney, Lachelle S	[id number]	Medicaid	[id number]  A	N	N	O	2024	fentanyl 25 mcg/hr patch	5	15	Britney, Lachelle S	[id number]	Medicaid	[id number]  A	N	N	O	2024	oxycodone hcl (ir) 10 mg tab	270	15	Britney, Lachelle S	[id number]	Medicaid	[id number]  A	N	N		2024	dronabinol 2.5 mg capsule	60	30	Britney, Lachelle S	[id number]	Medicaid	[id number]  A	N	N	O	2024	oxycodone hcl (ir) 10 mg tab	180	15	Britney, Lachelle S	[id number]	Medicaid	[id number]  A	N	N	O	2024	fentanyl 75 mcg/hr patch	10	30	Britney, Lachelle S	[id number]	Medicaid	[id number]  A	N	N	O	10/21/2024	10/26/2024	fentanyl 12 mcg/hr patch	10	30	Britney, Lachelle S	[id number]	Medicaid	[id number]  A	N	N	O	10/21/2024	10/26/2024	fentanyl 50 mcg/hr patch	10	30	Britney, Lachelle S	[id number]	Medicaid	[id number]  A	N	N	O	10/21/2024	10/26/2024	oxycodone hcl (ir) 10 mg tab	180	15	Britney, Lachelle S	[id number]	Medicaid	[id number]  A	N	N	O	2024	10/01/2024	oxycodone hcl (ir) 10 mg tab	180	15	Britney Lachelle S	[id number]	Medicaid	[id number]  A	N	N	O	2024	09/15/2024	fentanyl 12 mcg/hr patch	10	30	Britney Lachelle S	[id number]	Medicaid	[id number]  A	N	N	O	2024	09/15/2024	oxycodone hcl (ir) 10 mg tab	180	15	Britney Lachelle S	[id number]	Medicaid	[id number]  A	N	N	O	2024	09/15/2024	fentanyl 50 mcg/hr patch	10	30	Britney Lachelle S	[id number]	Medicaid	[id number]  A	N	N	O	2024	oxycodone hcl (ir) 10 mg tab	180	15	Britney Lachelle S	[id number]	Medicaid	[id number]  A	N	N	O	2024	oxycodone hcl (ir) 10 mg tab	180	15	Britney Lachelle S	[id number]	Medicaid	[id number]  A	N	N	O	2024	fentanyl 50 mcg/hr patch	10	30	Britney Lachelle S	[id number]	Medicaid	[id number]  A	N	N	O	2024	fentanyl 12 mcg/hr patch	10	30	Britney, Lachelle S	[id number]	Medicaid	[id number]  A	N	N	O	2024	oxycodone hcl (ir) 10 mg tab	180	15	Divya Floyd	[id number]	Medicaid	[id number]  A	N	N	O	2024	methadone hcl 5 mg tablet	30	15	Britney, Lachelle S	[id number]	Medicaid	[id number]  A	N	N	O	2024	oxycodone hcl (ir) 10 mg tab	180	15	Britney, Lachelle S	[id number]	Medicaid	[id number]  A	N	N	O	2024	morphine sulf er 30 mg tablet	28	14	Lachelle Tan	[id number]	Medicaid	[id number]  A	N	N	O	2024	oxycodone hcl (ir) 10 mg tab	180	15	Lachelle Tan	[id number]	Medicaid	[id number]  A	N	N	O	2024	oxycodone hcl (ir) 10 mg tab	180	15	Lachelle Tan	[id number]	Medicaid	[id number]  B	N	N	B	2025	lorazepam 0.5 mg tablet	30	8	Lachelle Tan	[id number]	Medicaid	[facility]  B	N	N	O	2025	oxycodone hcl (ir) 15 mg tab	168	7	Lachelle Tan	[id number]	Medicaid	[facility]  B	N	N	O	2025	fentanyl 100 mcg/hr patch	10	5	Lachelle Tan	[id number]	Medicaid	[facility]  B	N	N	O	2024	fentanyl 12 mcg/hr patch	10	30	Lachelle Tan	[id number]	Medicaid	[facility]  B	N	N	O	2024	fentanyl 50 mcg/hr patch	10	30	Lachelle Tan	[id number]	Medicaid	[facility]  B	N	N	O	2024	methadone hcl 5 mg tablet	30	30	Lachelle Tan	[id number]	Medicaid	[facility]  B	N	N	O	2024	morphine sulf er 30 mg tablet	42	14	Lachelle Tan	[id number]	Medicaid	[facility]  C	N	Y	O	2025	morphine sulfate powder *	3gm	3	[facility]	[id number]	Insurance	[facility]  C	N	N	O	2025	morphine sulfate powder *	3gm	3	Franklin Fitzpatrick MD	[id number]	Insurance	[facility]  C	N	N	B	2025	lorazepam 0.5 mg tablet	8	2	Franklin Fitzpatrick MD	[id number]	Cash	[facility]  C	N	Y	O	2025	morphine sulf 100 mg/5 ml conc	15ml	10	Franklin Fitzpatrick MD	[id number]	Cash	[facility]  C	N	Y	O	2025	fentanyl 100 mcg/hr patch	20	30	Susan Vega	[id number]	Insurance	[facility]  	N	Y	O	2025	fentanyl 100 mcg/hr patch	10	30	Shannan Lange	[id number]	[facility]  C	N	Y	O	2025	fentanyl 50 mcg/hr patch	10	30	Shannan Lange	[id number]	[facility]  C	N	N	O	2025	morphine sulf 100 mg/5 ml conc	100ml	6	Susan Vega	[id number]	[facility]  C	N	N	O	2025	oxycodone hcl (ir) 5 mg tablet	30	1	[facility]	[id number]	[facility]  C	N	N	O	2025	fentanyl 100 mcg/hr patch	2	6	[facility]	[id number]	Insurance	[facility]  C	N	N	O	2025	fentanyl 100 mcg/hr patch	15	15	Gonzalo Melara)	[id number]	[facility]  C	N	N	O	2025	methadone hcl 5 mg tablet	63	21	Gonzalo Melara)	[id number]	[facility]  C	N	N	O	2024	fentanyl 50 mcg/hr patch	5	15	Luciano Gomez	[id number]	Medicaid	[facility]  Medications:      MEDICATIONS  (STANDING):  amikacin  IVPB 1000 milliGRAM(s) IV Intermittent once  aMIOdarone    Tablet 100 milliGRAM(s) Oral daily  chlorhexidine 4% Liquid 1 Application(s) Topical <User Schedule>  dexAMETHasone  Injectable 4 milliGRAM(s) IV Push two times a day  famotidine    Tablet 20 milliGRAM(s) Oral daily  midodrine. 5 milliGRAM(s) Oral three times a day  morphine PCA (5 mG/mL) 30 milliLiter(s) PCA Continuous PCA Continuous  OLANZapine Disintegrating Tablet 5 milliGRAM(s) Oral at bedtime  piperacillin/tazobactam IVPB.. 4.5 Gram(s) IV Intermittent every 8 hours    MEDICATIONS  (PRN):  acetaminophen     Tablet .. 650 milliGRAM(s) Oral every 6 hours PRN Temp greater or equal to 38C (100.4F), Mild Pain (1 - 3)  melatonin 3 milliGRAM(s) Oral at bedtime PRN Insomnia  senna Syrup 10 milliLiter(s) Oral at bedtime PRN Constipation  simethicone 80 milliGRAM(s) Chew daily PRN Indigestion  sodium chloride 0.9% lock flush 10 milliLiter(s) IV Push every 1 hour PRN Pre/post blood products, medications, blood draw, and to maintain line patency    Labs:    CBC:                        9.2    11.88 )-----------( 285      ( 2025 07:15 )             27.5     CMP:        138  |  108  |  29[H]  ----------------------------<  80  3.6   |  19[L]  |  0.85    Ca    7.2[L]      2025 07:15    TPro  4.7[L]  /  Alb  1.1[L]  /  TBili  0.8  /  DBili  x   /  AST  16  /  ALT  7[L]  /  AlkPhos  112        Urinalysis Basic - ( 2025 07:15 )    Color: x / Appearance: x / SG: x / pH: x  Gluc: 80 mg/dL / Ketone: x  / Bili: x / Urobili: x   Blood: x / Protein: x / Nitrite: x   Leuk Esterase: x / RBC: x / WBC x   Sq Epi: x / Non Sq Epi: x / Bacteria: x    Imaging:   < from: CT Abdomen and Pelvis No Cont (25 @ 13:02) >  MPRESSION:  Evaluation very limited without IV contrast as well as marked anasarca.    Extensive soft tissue masses with osseous destruction involving the   pelvis and presacral space, not well characterized without IV contrast.    Bilateral ureteral stent extending to the right lower quadrant urostomy.   Mild hyperdensity within the right renal collecting system, which could   represent blood products. No hydronephrosis or obstructive stone/mass   seen.    Culture - Blood (25 @ 14:58)   Specimen Source: Blood Blood-Peripheral  Culture Results:   No growth at 24 hours    Historical Values  Culture - Blood (25 @ 14:58)   Specimen Source: Blood Blood-Peripheral  Culture Results:   No growth at 24 hours  Culture - Blood (25 @ 14:48)   - Pseudomonas aeruginosa: Detec  Gram Stain:   Growth in aerobic bottle: Gram Negative Rods  Specimen Source: Blood Blood-Peripheral  Organism: Blood Culture PCR  Culture Results:   Growth in aerobic bottle: Pseudomonas aeruginosa   Direct identification is available within approximately 3-5   hours either by Blood Panel Multiplexed PCR or Direct   Culture Results: Urine   >100,000 CFU/ml Gram Negative Rods (25 @ 03:47)    < from: 12 Lead ECG (25 @ 20:57) >  Ventricular Rate 136 BPM    Atrial Rate 136 BPM    P-R Interval 140 ms    QRS Duration 82 ms    Q-T Interval 294 ms    QTC Calculation(Bazett) 442 ms    P Axis 43 degrees    R Axis -22 degrees    T Axis 113 degrees    Diagnosis Line Sinus tachycardia  Anterior infarct , age undetermined  Abnormal ECG    PEx:  T(C): 36.4 (25 @ 16:02), Max: 36.4 (25 @ 16:02)  HR: 106 (25 @ 16:02) (86 - 106)  BP: 110/65 (25 @ 16:02) (85/55 - 110/65)  RR: 18 (25 @ 16:02) (18 - 18)  78SpO2: 99% (25 @ 16:02) (94% - 99%)  Wt(kg): 74.8    General: chronically ill appearing male in bed with c/o right hipa nd leg ain in bed c/o moderate back pain  HEENT: A/T, N/C anicteric, mm dry  Neck: supple no JVD   Cardiovascular: l S1, S2,tachycardic  no murmur, no edema  Respiratory: CTAB, no wheezes, no rales, no rhonchi  GI:  distended, + colostomy + stooling , + venting PEG  :  + urostomy,  hematuria  Musculoskeletal: weak, moves her b/l arms without difficulty  Neurologic: awake and alert and oriented x3, non focal   Skin:Glans Penis, Stage IV Pressure Injury ; Scrotum, Stage II Pressure Injury, Left Ischium, Stage II Pressure Injury; Sacral Stage III Pressure Injury  Lymph: no lymphadenopathy   Psychiatric: calm and appropriate during exam     Preadmit Karnofsky:  %           Current Karnofsky:     %  http://www.npcrc.org/files/news/karnofsky_performance_scale.pdf   http://www.npcrc.org/files/news/palliative_performance_scale_PPSv2.pdf  Cachexia (Y/N): Y  BMI: 23    Advanced Directives:   DNR/DNI trial NIV   MOLST       Decision maker :pt has capacity to kelly decisions, but defers decision making to daughter Rowan Disla [phone]  Legal surrogate: Rowan Disla     KENTRELL CLARK          MRN-[id number]           :1965    HPI:  59M w/ PMHx of colorectal CA s/p colostomy,  venting PEG, s/p urostomy bag, severe AS/Bicuspid AV, TAA, HTN, HLD, pAfib, sacral ulcer s/p wound bag and B/L  hip fracture who presents to the ED with bleeding from urostomy bag. Pt was  recently admitted to [facility] for home hospice  and was started on PCA Morphine for increased pain. Pt  presents to the ED with bleeding from urostomy bag. History is obtained from daughter at bedside. Per daughter, there has been increased bleeding and clots in the urostomy dsg since yesterday. Patient has has history of multiple antibiotic resistance UTI. Patient currently denies any acute symptoms or concerns. Denies headache, dizziness, chest pain, palpitations, SOB, abdominal pain, joint pain, diarrhea/constipation, or urinary symptoms.   (2025 06:00)    Pt arrived with hgb 7.4, dropped to 6 on  s/p 3 units PRBCS    PAST MEDICAL & SURGICAL HISTORY:  Colorectal cancer  Essential hypertension  HLD (hyperlipidemia)  Chronic atrial fibrillation  H/O aortic valve stenosis  Thoracic aortic aneurysm (TAA)  Sacral ulcer  History of rectal surgery  Colostomy  Urostomy  Venting PEG    FAMILY HISTORY:   Reviewed and found non contributory in mother or father    SOCIAL HISTORY: , 2 children,    ROS:    Unable to attain due to:                      Dyspnea (Jed 0-10): 0                       N/V (Y/N):   N                           Secretions (Y/N) :  N              Agitation(Y/N): N  Pain (Y/N): Y   patient  with c/o sharp, shooting, burning pain originating at left hip radiating down his leg. Presently a 7/10 on pain scale, relieved to 4/10 with PCA bolus dose. Exacerbated with movement.  Also with intermittent diffuse pain in abdomen, not an issue at time of assessment. pt sates that he wishes pain could be better managed      Other review of systems neg    Allergies    No Known Allergies    Intolerances    Opiate Naive (Y/N): N  -iStop reviewed (Y/N): Yes. No Rx found on iStop review  | Reference #: [id number]      PDI	My Rx	Current Rx	Drug Type	Rx Written	Rx Dispensed	Drug	Quantity	Days Supply	Prescriber Name	Prescriber CHITO #	Payment Method	Dispenser  A	N	N	O	2025	oxycodone hcl (ir) 10 mg tab	270	15	Soila Claudio NIRALI	[id number]	Medicaid	[id number]  A	N	N	O	2025	fentanyl 25 mcg/hr patch	10	30	Britney, Lachelle S	[id number]	Medicaid	[id number]  A	N	N		2024	dronabinol 2.5 mg capsule	60	30	Britney, Lachelle S	[id number]	Medicaid	[facility][id number]  A	N	N	O	2024	fentanyl 75 mcg/hr patch	10	30	Britney, Lachelle S	[id number]	Medicaid	[id number]  A	N	N	O	2024	oxycodone hcl (ir) 10 mg tab	270	15	Britney, Lachelle S	[id number]	Medicaid	[id number]  A	N	N	O	2024	fentanyl 25 mcg/hr patch	5	15	Britney, Lachelle S	[id number]	Medicaid	[id number]  A	N	N	O	2024	oxycodone hcl (ir) 10 mg tab	270	15	Britney, Lachelle S	[id number]	Medicaid	[id number]  A	N	N		2024	dronabinol 2.5 mg capsule	60	30	Britney, Lachelle S	[id number]	Medicaid	[id number]  A	N	N	O	2024	oxycodone hcl (ir) 10 mg tab	180	15	Britney, Lachelle S	[id number]	Medicaid	[id number]  A	N	N	O	2024	fentanyl 75 mcg/hr patch	10	30	Britney, Lachelle S	[id number]	Medicaid	[id number]  A	N	N	O	10/21/2024	10/26/2024	fentanyl 12 mcg/hr patch	10	30	Britney, Lachelle S	[id number]	Medicaid	[id number]  A	N	N	O	10/21/2024	10/26/2024	fentanyl 50 mcg/hr patch	10	30	Britney, Lachelle S	[id number]	Medicaid	[id number]  A	N	N	O	10/21/2024	10/26/2024	oxycodone hcl (ir) 10 mg tab	180	15	Briteny, Lachelle S	[id number]	Medicaid	[id number]  A	N	N	O	2024	10/01/2024	oxycodone hcl (ir) 10 mg tab	180	15	Britney Lachelle S	[id number]	Medicaid	[id number]  A	N	N	O	2024	09/15/2024	fentanyl 12 mcg/hr patch	10	30	Britney Lachelle S	[id number]	Medicaid	[id number]  A	N	N	O	2024	09/15/2024	oxycodone hcl (ir) 10 mg tab	180	15	Britney Lachelle S	[id number]	Medicaid	[id number]  A	N	N	O	2024	09/15/2024	fentanyl 50 mcg/hr patch	10	30	Britney Lachelle S	[id number]	Medicaid	[id number]  A	N	N	O	2024	oxycodone hcl (ir) 10 mg tab	180	15	Britney Lachelle S	[id number]	Medicaid	[id number]  A	N	N	O	2024	oxycodone hcl (ir) 10 mg tab	180	15	Britney Lachelle S	[id number]	Medicaid	[id number]  A	N	N	O	2024	fentanyl 50 mcg/hr patch	10	30	Britney Lachelle S	[id number]	Medicaid	[id number]  A	N	N	O	2024	fentanyl 12 mcg/hr patch	10	30	Britney, Lachelle S	[id number]	Medicaid	[id number]  A	N	N	O	2024	oxycodone hcl (ir) 10 mg tab	180	15	Divya Floyd	[id number]	Medicaid	[id number]  A	N	N	O	2024	methadone hcl 5 mg tablet	30	15	Britney, Lachelle S	[id number]	Medicaid	[id number]  A	N	N	O	2024	oxycodone hcl (ir) 10 mg tab	180	15	Britney, Lachelle S	[id number]	Medicaid	[id number]  A	N	N	O	2024	morphine sulf er 30 mg tablet	28	14	Lachelle Tan	[id number]	Medicaid	[id number]  A	N	N	O	2024	oxycodone hcl (ir) 10 mg tab	180	15	Lachelle Tan	[id number]	Medicaid	[id number]  A	N	N	O	2024	oxycodone hcl (ir) 10 mg tab	180	15	Lachelle Tan	[id number]	Medicaid	[id number]  B	N	N	B	2025	lorazepam 0.5 mg tablet	30	8	Lachelle Tan	[id number]	Medicaid	[facility]  B	N	N	O	2025	oxycodone hcl (ir) 15 mg tab	168	7	Lachelle Tan	[id number]	Medicaid	[facility]  B	N	N	O	2025	fentanyl 100 mcg/hr patch	10	5	Lachelle Tan	[id number]	Medicaid	[facility]  B	N	N	O	2024	fentanyl 12 mcg/hr patch	10	30	Lachelle Tan	[id number]	Medicaid	[facility]  B	N	N	O	2024	fentanyl 50 mcg/hr patch	10	30	Lachelle Tan	[id number]	Medicaid	[facility]  B	N	N	O	2024	methadone hcl 5 mg tablet	30	30	Lachelle Tan	[id number]	Medicaid	[facility]  B	N	N	O	2024	morphine sulf er 30 mg tablet	42	14	Lachelle Tan	[id number]	Medicaid	[facility]  C	N	Y	O	2025	morphine sulfate powder *	3gm	3	[facility]	[id number]	Insurance	[facility]  C	N	N	O	2025	morphine sulfate powder *	3gm	3	Franklin Fitzpatrick MD	[id number]	Insurance	[facility]  C	N	N	B	2025	lorazepam 0.5 mg tablet	8	2	Franklin Fitzpatrick MD	[id number]	Cash	[facility]  C	N	Y	O	2025	morphine sulf 100 mg/5 ml conc	15ml	10	Franklin Fitzpatrick MD	[id number]	Cash	[facility]  C	N	Y	O	2025	fentanyl 100 mcg/hr patch	20	30	Susan Vega	[id number]	Insurance	[facility]  	N	Y	O	2025	fentanyl 100 mcg/hr patch	10	30	Shannan Lange	[id number]	[facility]  C	N	Y	O	2025	fentanyl 50 mcg/hr patch	10	30	Shannan Lange	[id number]	[facility]  C	N	N	O	2025	morphine sulf 100 mg/5 ml conc	100ml	6	Susan Vega	[id number]	[facility]  C	N	N	O	2025	oxycodone hcl (ir) 5 mg tablet	30	1	[facility]	[id number]	[facility]  C	N	N	O	2025	fentanyl 100 mcg/hr patch	2	6	[facility]	[id number]	Insurance	[facility]  C	N	N	O	2025	fentanyl 100 mcg/hr patch	15	15	Gonzalo Melara)	[id number]	[facility]  C	N	N	O	2025	methadone hcl 5 mg tablet	63	21	Gonzalo Melara)	[id number]	[facility]  C	N	N	O	2024	fentanyl 50 mcg/hr patch	5	15	Luciano Gomez	[id number]	Medicaid	[facility]  Medications:      MEDICATIONS  (STANDING):  amikacin  IVPB 1000 milliGRAM(s) IV Intermittent once  aMIOdarone    Tablet 100 milliGRAM(s) Oral daily  chlorhexidine 4% Liquid 1 Application(s) Topical <User Schedule>  dexAMETHasone  Injectable 4 milliGRAM(s) IV Push two times a day  famotidine    Tablet 20 milliGRAM(s) Oral daily  midodrine. 5 milliGRAM(s) Oral three times a day  morphine PCA (5 mG/mL) 30 milliLiter(s) PCA Continuous PCA Continuous  OLANZapine Disintegrating Tablet 5 milliGRAM(s) Oral at bedtime  piperacillin/tazobactam IVPB.. 4.5 Gram(s) IV Intermittent every 8 hours    MEDICATIONS  (PRN):  acetaminophen     Tablet .. 650 milliGRAM(s) Oral every 6 hours PRN Temp greater or equal to 38C (100.4F), Mild Pain (1 - 3)  melatonin 3 milliGRAM(s) Oral at bedtime PRN Insomnia  senna Syrup 10 milliLiter(s) Oral at bedtime PRN Constipation  simethicone 80 milliGRAM(s) Chew daily PRN Indigestion  sodium chloride 0.9% lock flush 10 milliLiter(s) IV Push every 1 hour PRN Pre/post blood products, medications, blood draw, and to maintain line patency    Labs:    CBC:                        9.2    11.88 )-----------( 285      ( 2025 07:15 )             27.5     CMP:        138  |  108  |  29[H]  ----------------------------<  80  3.6   |  19[L]  |  0.85    Ca    7.2[L]      2025 07:15    TPro  4.7[L]  /  Alb  1.1[L]  /  TBili  0.8  /  DBili  x   /  AST  16  /  ALT  7[L]  /  AlkPhos  112        Urinalysis Basic - ( 2025 07:15 )    Color: x / Appearance: x / SG: x / pH: x  Gluc: 80 mg/dL / Ketone: x  / Bili: x / Urobili: x   Blood: x / Protein: x / Nitrite: x   Leuk Esterase: x / RBC: x / WBC x   Sq Epi: x / Non Sq Epi: x / Bacteria: x    Imaging:   < from: CT Abdomen and Pelvis No Cont (25 @ 13:02) >  MPRESSION:  Evaluation very limited without IV contrast as well as marked anasarca.    Extensive soft tissue masses with osseous destruction involving the   pelvis and presacral space, not well characterized without IV contrast.    Bilateral ureteral stent extending to the right lower quadrant urostomy.   Mild hyperdensity within the right renal collecting system, which could   represent blood products. No hydronephrosis or obstructive stone/mass   seen.    Culture - Blood (25 @ 14:58)   Specimen Source: Blood Blood-Peripheral  Culture Results:   No growth at 24 hours    Historical Values  Culture - Blood (25 @ 14:58)   Specimen Source: Blood Blood-Peripheral  Culture Results:   No growth at 24 hours  Culture - Blood (25 @ 14:48)   - Pseudomonas aeruginosa: Detec  Gram Stain:   Growth in aerobic bottle: Gram Negative Rods  Specimen Source: Blood Blood-Peripheral  Organism: Blood Culture PCR  Culture Results:   Growth in aerobic bottle: Pseudomonas aeruginosa   Direct identification is available within approximately 3-5   hours either by Blood Panel Multiplexed PCR or Direct   Culture Results: Urine   >100,000 CFU/ml Gram Negative Rods (25 @ 03:47)    < from: 12 Lead ECG (25 @ 20:57) >  Ventricular Rate 136 BPM    Atrial Rate 136 BPM    P-R Interval 140 ms    QRS Duration 82 ms    Q-T Interval 294 ms    QTC Calculation(Bazett) 442 ms    P Axis 43 degrees    R Axis -22 degrees    T Axis 113 degrees    Diagnosis Line Sinus tachycardia  Anterior infarct , age undetermined  Abnormal ECG    PEx:  T(C): 36.4 (25 @ 16:02), Max: 36.4 (25 @ 16:02)  HR: 106 (25 @ 16:02) (86 - 106)  BP: 110/65 (25 @ 16:02) (85/55 - 110/65)  RR: 18 (25 @ 16:02) (18 - 18)  78SpO2: 99% (25 @ 16:02) (94% - 99%)  Wt(kg): 74.8    General: chronically ill appearing male in bed with c/o right hip and leg pain in bed c/o moderate back pain  HEENT: A/T, N/C anicteric, mm dry  Neck: supple no JVD   Cardiovascular: l S1, S2,tachycardic  no murmur, + 4 edema to BLE/BUE with weeping,   full body anasarca   Respiratory: CTAB, no wheezes, no rales, no rhonchi  GI:  distended, + colostomy + stooling , + venting PEG  :  + urostomy,  hematuria  Musculoskeletal: weak, moves her b/l arms without difficulty  Neurologic: awake and alert and oriented x3, non focal   Skin:Glans Penis, Stage IV Pressure Injury ; Scrotum, Stage II Pressure Injury, Left Ischium, Stage II Pressure Injury; Sacral Stage III Pressure Injury  Lymph: no lymphadenopathy   Psychiatric: calm and appropriate during exam     Preadmit Karnofsky:  %           Current Karnofsky:     %  http://www.npcrc.org/files/news/karnofsky_performance_scale.pdf   http://www.npcrc.org/files/news/palliative_performance_scale_[email or web address].pdf  Cachexia (Y/N): Y  BMI: 23    Advanced Directives:   DNR/DNI trial NIV   MOLST       Decision maker :pt has capacity to kelly decisions, but defers decision making to daughter Rowan Disla [phone]  Legal surrogate: Rowan Disla     KENTRELL CLARK          MRN-[id number]           :1965    HPI:  59M w/ PMHx of colorectal CA s/p colostomy,  venting PEG, s/p urostomy bag, severe AS/Bicuspid AV, TAA, HTN, HLD, pAfib, sacral ulcer s/p wound bag and B/L  hip fracture who presents to the ED with bleeding from urostomy bag. Pt was  recently admitted to [facility] for home hospice  and was started on PCA Morphine for increased pain. History is obtained from daughter at bedside. Per daughter, there has been increased bleeding and clots in the urostomy dsg since yesterday. Patient has  history of multiple antibiotic resistance UTI. Patient currently denies any acute symptoms or concerns. Denies headache, dizziness, chest pain, palpitations, SOB, abdominal pain, joint pain, diarrhea/constipation, or urinary symptoms.   (2025 06:00)    Pt arrived with hgb 7.4, dropped to 6 on  s/p 3 units PRBCS    PAST MEDICAL & SURGICAL HISTORY:  Colorectal cancer  Essential hypertension  HLD (hyperlipidemia)  Chronic atrial fibrillation  H/O aortic valve stenosis  Thoracic aortic aneurysm (TAA)  Sacral ulcer  History of rectal surgery  Colostomy  Urostomy  Venting PEG    FAMILY HISTORY:   Reviewed and found non contributory in mother or father    SOCIAL HISTORY: , 2 children, Flor speaking (refusing  wishes for daughter to interpret)    ROS:                     Dyspnea (Jed 0-10): 0                       N/V (Y/N):   N                           Secretions (Y/N) :  N              Agitation(Y/N): N  Pain (Y/N): Y   patient  with c/o sharp, shooting, burning pain originating from B/L hps radiating down his legs Pain R>L. Presently a 7/10 on pain scale, relieved to 4/10 with PCA bolus dose. Exacerbated with movement.  Also with intermittent diffuse pain in abdomen, not an issue at time of assessment. pt tsates that he wishes pain could be better managed      Other review of systems negative     Allergies    No Known Allergies    Intolerances    Opiate Naive (Y/N): N  -iStop reviewed (Y/N): Yes. No Rx found on iStop review  | Reference #: [id number]      PDI	My Rx	Current Rx	Drug Type	Rx Written	Rx Dispensed	Drug	Quantity	Days Supply	Prescriber Name	Prescriber CHITO #	Payment Method	Dispenser  A	N	N	O	2025	oxycodone hcl (ir) 10 mg tab	270	15	ClaudioSoila NIRALI	[id number]	Medicaid	[id number]  A	N	N	O	2025	fentanyl 25 mcg/hr patch	10	30	Britney, Lachelle S	[id number]	Medicaid	[id number]  A	N	N		2024	dronabinol 2.5 mg capsule	60	30	Britney, Lachelle S	[id number]	Medicaid	[id number]  A	N	N	O	2024	fentanyl 75 mcg/hr patch	10	30	Britney, Lachelle S	[id number]	Medicaid	[id number]  A	N	N	O	2024	oxycodone hcl (ir) 10 mg tab	270	15	Britney, Lachelle S	[id number]	Medicaid	[id number]  A	N	N	O	2024	fentanyl 25 mcg/hr patch	5	15	Britney, Lachelle S	[id number]	Medicaid	[id number]  A	N	N	O	2024	oxycodone hcl (ir) 10 mg tab	270	15	Britney, Lachelle S	[id number]	Medicaid	[id number]  A	N	N		2024	dronabinol 2.5 mg capsule	60	30	Britney, Lachelle S	[id number]	Medicaid	[id number]  A	N	N	O	2024	oxycodone hcl (ir) 10 mg tab	180	15	Britney, Lachelle S	[id number]	Medicaid	[id number]  A	N	N	O	2024	fentanyl 75 mcg/hr patch	10	30	Britney, Lachelle S	[id number]	Medicaid	[id number]  A	N	N	O	10/21/2024	10/26/2024	fentanyl 12 mcg/hr patch	10	30	Britney, Lachelle S	[id number]	Medicaid	[id number]  A	N	N	O	10/21/2024	10/26/2024	fentanyl 50 mcg/hr patch	10	30	Britney, Lachelle S	[id number]	Medicaid	[id number]  A	N	N	O	10/21/2024	10/26/2024	oxycodone hcl (ir) 10 mg tab	180	15	Britney Lachelle S	[id number]	Medicaid	[id number]  A	N	N	O	2024	10/01/2024	oxycodone hcl (ir) 10 mg tab	180	15	Britney Lachelle S	[id number]	Medicaid	[id number]  A	N	N	O	2024	09/15/2024	fentanyl 12 mcg/hr patch	10	30	Britney Lachelle S	[id number]	Medicaid	[id number]  A	N	N	O	2024	09/15/2024	oxycodone hcl (ir) 10 mg tab	180	15	Britney Lachelle S	[id number]	Medicaid	[id number]  A	N	N	O	2024	09/15/2024	fentanyl 50 mcg/hr patch	10	30	Britney Lachelle S	[id number]	Medicaid	[id number]  A	N	N	O	2024	oxycodone hcl (ir) 10 mg tab	180	15	Britney Lachelle S	[id number]	Medicaid	[id number]  A	N	N	O	2024	oxycodone hcl (ir) 10 mg tab	180	15	Britney Lachelle S	[id number]	Medicaid	[id number]  A	N	N	O	2024	fentanyl 50 mcg/hr patch	10	30	Britney Lachelle S	[id number]	Medicaid	[id number]  A	N	N	O	2024	fentanyl 12 mcg/hr patch	10	30	Britney Lachelle S	[id number]	Medicaid	[id number]  A	N	N	O	2024	oxycodone hcl (ir) 10 mg tab	180	15	Divya Floyd	[id number]	Medicaid	[id number]  A	N	N	O	2024	methadone hcl 5 mg tablet	30	15	Britney Lachelle S	[id number]	Medicaid	[id number]  A	N	N	O	2024	oxycodone hcl (ir) 10 mg tab	180	15	Britney Lachelle S	[id number]	Medicaid	[id number]  A	N	N	O	2024	morphine sulf er 30 mg tablet	28	14	Lachelle Tan	[id number]	Medicaid	[id number]  A	N	N	O	2024	oxycodone hcl (ir) 10 mg tab	180	15	Lachelle Tan	[id number]	Medicaid	[id number]  A	N	N	O	2024	oxycodone hcl (ir) 10 mg tab	180	15	Lachelle Tan	[id number]	Medicaid	[id number]  B	N	N	B	2025	lorazepam 0.5 mg tablet	30	8	Lcahelle Tan	[id number]	Medicaid	[facility]  B	N	N	O	2025	oxycodone hcl (ir) 15 mg tab	168	7	Lachelle Tan	[id number]	Medicaid	[facility]  B	N	N	O	2025	fentanyl 100 mcg/hr patch	10	5	Lachelle Tan	[id number]	Medicaid	[facility]  B	N	N	O	2024	fentanyl 12 mcg/hr patch	10	30	Lachelle Tan	[id number]	Medicaid	[facility]  B	N	N	O	2024	fentanyl 50 mcg/hr patch	10	30	Lachelle Tan	[id number]	Medicaid	[facility]  B	N	N	O	2024	methadone hcl 5 mg tablet	30	30	Lachelle Tan	[id number]	Medicaid	[facility]  B	N	N	O	2024	morphine sulf er 30 mg tablet	42	14	Lachelle Tan	[id number]	Medicaid	[facility]  C	N	Y	O	2025	morphine sulfate powder *	3gm	3	[facility]	[id number]	Insurance	[facility]  C	N	N	O	2025	morphine sulfate powder *	3gm	3	Franklin Fitzpatrick MD	[id number]	Insurance	[facility]  C	N	N	B	2025	lorazepam 0.5 mg tablet	8	2	Franklin Fitzpatrick MD	[id number]	[place]	[facility]  C	N	Y	O	2025	morphine sulf 100 mg/5 ml conc	15ml	10	Franklin Fitzpatrick MD	[id number]	[place]	[facility]  C	N	Y	O	2025	fentanyl 100 mcg/hr patch	20	30	Susan Vega	[id number]	Insurance	[facility]  	N	Y	O	2025	fentanyl 100 mcg/hr patch	10	30	Shannan Lange	[id number]	[facility]  	N	Y	O	2025	fentanyl 50 mcg/hr patch	10	30	Shannan Lange	[id number]	[facility]  C	N	N	O	2025	morphine sulf 100 mg/5 ml conc	100ml	6	Susan Vega	[id number]	[facility]  C	N	N	O	2025	oxycodone hcl (ir) 5 mg tablet	30	1	[facility]	[id number]	[facility]  C	N	N	O	2025	fentanyl 100 mcg/hr patch	2	6	[facility]	[id number]	[facility]  C	N	N	O	2025	fentanyl 100 mcg/hr patch	15	15	Gonzalo Melara)	[id number]	[facility]  C	N	N	O	2025	methadone hcl 5 mg tablet	63	21	Gonzalo Melara)	[id number]	[facility]  C	N	N	O	2024	fentanyl 50 mcg/hr patch	5	15	Luciano Gomez	[id number]	Medicaid	[facility]  Medications:      MEDICATIONS  (STANDING):  amikacin  IVPB 1000 milliGRAM(s) IV Intermittent once  aMIOdarone    Tablet 100 milliGRAM(s) Oral daily  chlorhexidine 4% Liquid 1 Application(s) Topical <User Schedule>  dexAMETHasone  Injectable 4 milliGRAM(s) IV Push two times a day  famotidine    Tablet 20 milliGRAM(s) Oral daily  midodrine. 5 milliGRAM(s) Oral three times a day  morphine PCA (5 mG/mL) 30 milliLiter(s) PCA Continuous PCA Continuous  OLANZapine Disintegrating Tablet 5 milliGRAM(s) Oral at bedtime  piperacillin/tazobactam IVPB.. 4.5 Gram(s) IV Intermittent every 8 hours    MEDICATIONS  (PRN):  acetaminophen     Tablet .. 650 milliGRAM(s) Oral every 6 hours PRN Temp greater or equal to 38C (100.4F), Mild Pain (1 - 3)  melatonin 3 milliGRAM(s) Oral at bedtime PRN Insomnia  senna Syrup 10 milliLiter(s) Oral at bedtime PRN Constipation  simethicone 80 milliGRAM(s) Chew daily PRN Indigestion  sodium chloride 0.9% lock flush 10 milliLiter(s) IV Push every 1 hour PRN Pre/post blood products, medications, blood draw, and to maintain line patency    Labs:    CBC:                        9.2    11.88 )-----------( 285      ( 2025 07:15 )             27.5     CMP:        138  |  108  |  29[H]  ----------------------------<  80  3.6   |  19[L]  |  0.85    Ca    7.2[L]      2025 07:15    TPro  4.7[L]  /  Alb  1.1[L]  /  TBili  0.8  /  DBili  x   /  AST  16  /  ALT  7[L]  /  AlkPhos  112        Urinalysis Basic - ( 2025 07:15 )    Color: x / Appearance: x / SG: x / pH: x  Gluc: 80 mg/dL / Ketone: x  / Bili: x / Urobili: x   Blood: x / Protein: x / Nitrite: x   Leuk Esterase: x / RBC: x / WBC x   Sq Epi: x / Non Sq Epi: x / Bacteria: x    Imaging:   < from: CT Abdomen and Pelvis No Cont (25 @ 13:02) >  MPRESSION:  Evaluation very limited without IV contrast as well as marked anasarca.    Extensive soft tissue masses with osseous destruction involving the   pelvis and presacral space, not well characterized without IV contrast.    Bilateral ureteral stent extending to the right lower quadrant urostomy.   Mild hyperdensity within the right renal collecting system, which could   represent blood products. No hydronephrosis or obstructive stone/mass   seen.    Culture - Blood (25 @ 14:58)   Specimen Source: Blood Blood-Peripheral  Culture Results:   No growth at 24 hours    Historical Values  Culture - Blood (25 @ 14:58)   Specimen Source: Blood Blood-Peripheral  Culture Results:   No growth at 24 hours  Culture - Blood (25 @ 14:48)   - Pseudomonas aeruginosa: Detec  Gram Stain:   Growth in aerobic bottle: Gram Negative Rods  Specimen Source: Blood Blood-Peripheral  Organism: Blood Culture PCR  Culture Results:   Growth in aerobic bottle: Pseudomonas aeruginosa   Direct identification is available within approximately 3-5   hours either by Blood Panel Multiplexed PCR or Direct   Culture Results: Urine   >100,000 CFU/ml Gram Negative Rods (25 @ 03:47)    < from: 12 Lead ECG (25 @ 20:57) >  Ventricular Rate 136 BPM    Atrial Rate 136 BPM    P-R Interval 140 ms    QRS Duration 82 ms    Q-T Interval 294 ms    QTC Calculation(Bazett) 442 ms    P Axis 43 degrees    R Axis -22 degrees    T Axis 113 degrees    Diagnosis Line Sinus tachycardia  Anterior infarct , age undetermined  Abnormal ECG    PEx:  T(C): 36.4 (25 @ 16:02), Max: 36.4 (25 @ 16:02)  HR: 106 (25 @ 16:02) (86 - 106)  BP: 110/65 (25 @ 16:02) (85/55 - 110/65)  RR: 18 (25 @ 16:02) (18 - 18)  78SpO2: 99% (25 @ 16:02) (94% - 99%)  Wt(kg): 74.8    General: chronically ill appearing male in bed with c/o right hip and leg pain in bed c/o moderate back pain  HEENT: A/T, N/C anicteric, mm dry  Neck: supple no JVD   Cardiovascular: l S1, S2,tachycardic  no murmur, + 4 edema to BLE/BUE with weeping,   full body anasarca   Respiratory: CTAB, no wheezes, no rales, no rhonchi  GI:  distended, + colostomy + stooling , + venting PEG  :  + urostomy,  hematuria  Musculoskeletal: weak, moves her b/l arms without difficulty  Neurologic: awake and alert and oriented x3, non focal   Skin: Glans Penis, Stage IV Pressure Injury ; Scrotum, Stage II Pressure Injury, Left Ischium, Stage II Pressure Injury; Sacral Stage III Pressure Injury  Lymph: no lymphadenopathy   Psychiatric: calm and appropriate during exam     Preadmit Karnofsky:  %           Current Karnofsky:     %  http://www.npcrc.org/files/news/karnofsky_performance_scale.pdf   http://www.npcrc.org/files/news/palliative_performance_scale_PPSv2.pdf  Cachexia (Y/N): Y  BMI: 23    Advanced Directives:   DNR/DNI trial NIV   MOLST       Decision maker :pt has capacity to kelly decisions, but defers decision making to daughter Rowan Disla [phone]  Legal surrogate: Rowan Disla

## 2025-04-28 NOTE — DIETITIAN INITIAL EVALUATION ADULT - PERTINENT LABORATORY DATA
04-28    138  |  108  |  29[H]  ----------------------------<  80  3.6   |  19[L]  |  0.85    Ca    7.2[L]      28 Apr 2025 07:15    TPro  4.7[L]  /  Alb  1.1[L]  /  TBili  0.8  /  DBili  x   /  AST  16  /  ALT  7[L]  /  AlkPhos  112  04-28

## 2025-04-29 DIAGNOSIS — R78.81 BACTEREMIA: ICD-10-CM

## 2025-04-29 DIAGNOSIS — G89.3 NEOPLASM RELATED PAIN (ACUTE) (CHRONIC): ICD-10-CM

## 2025-04-29 LAB
-  AMIKACIN: SIGNIFICANT CHANGE UP
-  AZTREONAM: SIGNIFICANT CHANGE UP
-  AZTREONAM: SIGNIFICANT CHANGE UP
-  CEFEPIME: SIGNIFICANT CHANGE UP
-  CEFEPIME: SIGNIFICANT CHANGE UP
-  CEFTAZIDIME: SIGNIFICANT CHANGE UP
-  CEFTAZIDIME: SIGNIFICANT CHANGE UP
-  CIPROFLOXACIN: SIGNIFICANT CHANGE UP
-  CIPROFLOXACIN: SIGNIFICANT CHANGE UP
-  IMIPENEM: SIGNIFICANT CHANGE UP
-  IMIPENEM: SIGNIFICANT CHANGE UP
-  LEVOFLOXACIN: SIGNIFICANT CHANGE UP
-  LEVOFLOXACIN: SIGNIFICANT CHANGE UP
-  MEROPENEM: SIGNIFICANT CHANGE UP
-  MEROPENEM: SIGNIFICANT CHANGE UP
-  PIPERACILLIN/TAZOBACTAM: SIGNIFICANT CHANGE UP
-  PIPERACILLIN/TAZOBACTAM: SIGNIFICANT CHANGE UP
ANION GAP SERPL CALC-SCNC: 7 MMOL/L — SIGNIFICANT CHANGE UP (ref 5–17)
BUN SERPL-MCNC: 31 MG/DL — HIGH (ref 7–23)
CALCIUM SERPL-MCNC: 7.2 MG/DL — LOW (ref 8.5–10.1)
CHLORIDE SERPL-SCNC: 106 MMOL/L — SIGNIFICANT CHANGE UP (ref 96–108)
CO2 SERPL-SCNC: 22 MMOL/L — SIGNIFICANT CHANGE UP (ref 22–31)
CREAT SERPL-MCNC: 0.91 MG/DL — SIGNIFICANT CHANGE UP (ref 0.5–1.3)
CULTURE RESULTS: ABNORMAL
CULTURE RESULTS: ABNORMAL
EGFR: 98 ML/MIN/1.73M2 — SIGNIFICANT CHANGE UP
EGFR: 98 ML/MIN/1.73M2 — SIGNIFICANT CHANGE UP
GLUCOSE SERPL-MCNC: 170 MG/DL — HIGH (ref 70–99)
GRAM STN FLD: ABNORMAL
HCT VFR BLD CALC: 24.5 % — LOW (ref 39–50)
HGB BLD-MCNC: 7.8 G/DL — LOW (ref 13–17)
MCHC RBC-ENTMCNC: 28.2 PG — SIGNIFICANT CHANGE UP (ref 27–34)
MCHC RBC-ENTMCNC: 31.8 G/DL — LOW (ref 32–36)
MCV RBC AUTO: 88.4 FL — SIGNIFICANT CHANGE UP (ref 80–100)
METHOD TYPE: SIGNIFICANT CHANGE UP
METHOD TYPE: SIGNIFICANT CHANGE UP
NRBC BLD AUTO-RTO: 0 /100 WBCS — SIGNIFICANT CHANGE UP (ref 0–0)
ORGANISM # SPEC MICROSCOPIC CNT: ABNORMAL
ORGANISM # SPEC MICROSCOPIC CNT: SIGNIFICANT CHANGE UP
ORGANISM # SPEC MICROSCOPIC CNT: SIGNIFICANT CHANGE UP
PLATELET # BLD AUTO: 277 K/UL — SIGNIFICANT CHANGE UP (ref 150–400)
POTASSIUM SERPL-MCNC: 3.6 MMOL/L — SIGNIFICANT CHANGE UP (ref 3.5–5.3)
POTASSIUM SERPL-SCNC: 3.6 MMOL/L — SIGNIFICANT CHANGE UP (ref 3.5–5.3)
RBC # BLD: 2.77 M/UL — LOW (ref 4.2–5.8)
RBC # FLD: 18.9 % — HIGH (ref 10.3–14.5)
SODIUM SERPL-SCNC: 135 MMOL/L — SIGNIFICANT CHANGE UP (ref 135–145)
SPECIMEN SOURCE: SIGNIFICANT CHANGE UP
SPECIMEN SOURCE: SIGNIFICANT CHANGE UP
WBC # BLD: 11.34 K/UL — HIGH (ref 3.8–10.5)
WBC # FLD AUTO: 11.34 K/UL — HIGH (ref 3.8–10.5)

## 2025-04-29 PROCEDURE — 99233 SBSQ HOSP IP/OBS HIGH 50: CPT | Mod: GW

## 2025-04-29 PROCEDURE — 99232 SBSQ HOSP IP/OBS MODERATE 35: CPT

## 2025-04-29 PROCEDURE — G0545: CPT

## 2025-04-29 RX ADMIN — Medication 25 GRAM(S): at 21:24

## 2025-04-29 RX ADMIN — MIDODRINE HYDROCHLORIDE 5 MILLIGRAM(S): 5 TABLET ORAL at 13:55

## 2025-04-29 RX ADMIN — Medication 20 MILLIGRAM(S): at 13:55

## 2025-04-29 RX ADMIN — Medication 80 MILLIGRAM(S): at 22:20

## 2025-04-29 RX ADMIN — Medication 25 GRAM(S): at 13:55

## 2025-04-29 RX ADMIN — AMIODARONE HYDROCHLORIDE 100 MILLIGRAM(S): 50 INJECTION, SOLUTION INTRAVENOUS at 05:24

## 2025-04-29 RX ADMIN — Medication 30 MILLILITER(S): at 07:30

## 2025-04-29 RX ADMIN — Medication 1 APPLICATION(S): at 05:32

## 2025-04-29 RX ADMIN — OLANZAPINE 5 MILLIGRAM(S): 10 TABLET ORAL at 21:25

## 2025-04-29 RX ADMIN — Medication 25 GRAM(S): at 05:24

## 2025-04-29 RX ADMIN — Medication 30 MILLILITER(S): at 19:28

## 2025-04-29 RX ADMIN — MIDODRINE HYDROCHLORIDE 5 MILLIGRAM(S): 5 TABLET ORAL at 05:24

## 2025-04-29 RX ADMIN — Medication 30 MILLILITER(S): at 14:39

## 2025-04-29 RX ADMIN — Medication 30 MILLILITER(S): at 12:13

## 2025-04-29 RX ADMIN — DEXAMETHASONE 4 MILLIGRAM(S): 0.5 TABLET ORAL at 05:24

## 2025-04-29 RX ADMIN — Medication 30 MILLILITER(S): at 16:17

## 2025-04-29 RX ADMIN — Medication 1 APPLICATION(S): at 05:34

## 2025-04-29 RX ADMIN — DEXAMETHASONE 4 MILLIGRAM(S): 0.5 TABLET ORAL at 13:56

## 2025-04-29 NOTE — PROGRESS NOTE ADULT - SUBJECTIVE AND OBJECTIVE BOX
Patient is a 59y old  Male who presents with a chief complaint of Hematuria (29 Apr 2025 13:39)      INTERVAL HPI/OVERNIGHT EVENTS:  Pt was seen and examined, no acute events.      MEDICATIONS  (STANDING):  aMIOdarone    Tablet 100 milliGRAM(s) Oral daily  chlorhexidine 4% Liquid 1 Application(s) Topical <User Schedule>  dexAMETHasone  Injectable 4 milliGRAM(s) IV Push two times a day  famotidine    Tablet 20 milliGRAM(s) Oral daily  midodrine. 5 milliGRAM(s) Oral three times a day  morphine PCA (5 mG/mL) 30 milliLiter(s) PCA Continuous PCA Continuous  OLANZapine Disintegrating Tablet 5 milliGRAM(s) Oral at bedtime  piperacillin/tazobactam IVPB.. 4.5 Gram(s) IV Intermittent every 8 hours    MEDICATIONS  (PRN):  acetaminophen     Tablet .. 650 milliGRAM(s) Oral every 6 hours PRN Temp greater or equal to 38C (100.4F), Mild Pain (1 - 3)  melatonin 3 milliGRAM(s) Oral at bedtime PRN Insomnia  morphine  - Injectable 2 milliGRAM(s) IV Push every 1 hour PRN Breakthrough pain  senna Syrup 10 milliLiter(s) Oral at bedtime PRN Constipation  simethicone 80 milliGRAM(s) Chew daily PRN Indigestion  sodium chloride 0.9% lock flush 10 milliLiter(s) IV Push every 1 hour PRN Pre/post blood products, medications, blood draw, and to maintain line patency      Allergies    No Known Allergies    Intolerances          Vital Signs Last 24 Hrs  T(C): 36.4 (29 Apr 2025 16:29), Max: 36.4 (29 Apr 2025 05:31)  T(F): 97.6 (29 Apr 2025 16:29), Max: 97.6 (29 Apr 2025 12:44)  HR: 94 (29 Apr 2025 19:02) (71 - 94)  BP: 121/80 (29 Apr 2025 16:29) (88/63 - 121/80)  BP(mean): --  RR: 18 (29 Apr 2025 16:29) (18 - 18)  SpO2: 98% (29 Apr 2025 16:29) (96% - 98%)    Parameters below as of 29 Apr 2025 16:29  Patient On (Oxygen Delivery Method): room air        PHYSICAL EXAM:  GENERAL:  frail, NAD  HEAD:  Atraumatic, Normocephalic  EYES: EOMI, PERRLA, conjunctiva and sclera clear  ENMT: No tonsillar erythema, exudates, or enlargement; Moist mucous membranes,   NECK: Supple, No JVD  NERVOUS SYSTEM:  Alert & Oriented X3, generalized weakness   CHEST/LUNG: Clear to percussion bilaterally; No rales, rhonchi, wheezing, or rubs  HEART: RRR, S1, S2  ABDOMEN: Soft, Nontender, distended; Bowel sounds present,  colostomy bag and and anal fistula with fecal material  : bilat nephrostomies, R nephrostomy bag with blood clots , blood  EXTREMITIES:  Anasarca          LABS:                        7.8    11.34 )-----------( 277      ( 29 Apr 2025 06:30 )             24.5     04-29    135  |  106  |  31[H]  ----------------------------<  170[H]  3.6   |  22  |  0.91    Ca    7.2[L]      29 Apr 2025 06:30    TPro  4.7[L]  /  Alb  1.1[L]  /  TBili  0.8  /  DBili  x   /  AST  16  /  ALT  7[L]  /  AlkPhos  112  04-28      Urinalysis Basic - ( 29 Apr 2025 06:30 )    Color: x / Appearance: x / SG: x / pH: x  Gluc: 170 mg/dL / Ketone: x  / Bili: x / Urobili: x   Blood: x / Protein: x / Nitrite: x   Leuk Esterase: x / RBC: x / WBC x   Sq Epi: x / Non Sq Epi: x / Bacteria: x      CAPILLARY BLOOD GLUCOSE          Culture - Blood (collected 26 Apr 2025 14:58)  Source: Blood Blood-Peripheral  Preliminary Report (29 Apr 2025 02:02):    No growth at 48 Hours    Culture - Blood (collected 26 Apr 2025 14:48)  Source: Blood Blood-Peripheral  Gram Stain (29 Apr 2025 07:42):    Growth in aerobic bottle: Gram Negative Rods  Final Report (29 Apr 2025 07:42):    Growth in aerobic bottle: Pseudomonas aeruginosa    Direct identification is available within approximately 3-5    hours either by Blood Panel Multiplexed PCR or Direct    MALDI-TOF. Details: [email or web address]  Organism: Blood Culture PCR  Pseudomonas aeruginosa (29 Apr 2025 07:42)  Organism: Blood Culture PCR (29 Apr 2025 07:42)  Organism: Pseudomonas aeruginosa (29 Apr 2025 07:42)    Urinalysis with Rflx Culture (collected 26 Apr 2025 03:47)    Culture - Urine (collected 26 Apr 2025 03:47)  Source: Clean Catch  Final Report (29 Apr 2025 19:34):    >100,000 CFU/ml Pseudomonas aeruginosa  Organism: Pseudomonas aeruginosa (29 Apr 2025 19:34)  Organism: Pseudomonas aeruginosa (29 Apr 2025 19:34)      RADIOLOGY & ADDITIONAL TESTS:    Imaging Personally Reviewed:  [ ] YES  [ ] NO    Consultant(s) Notes Reviewed:  [ ] YES  [ ] NO    Care Discussed with Consultants/Other Providers [ ] YES  [ ] NO

## 2025-04-29 NOTE — PROGRESS NOTE ADULT - SUBJECTIVE AND OBJECTIVE BOX
[facility]  INFECTIOUS DISEASES   [address] [place] [address]  Tel: [phone]     Fax: [phone]  ==============================================================================  DO Angela Hernandez MD Alexandra Gutman, NP   ==============================================================================      AMBER KENTRELL  [id number]  59y ([birth year])      Interval History: patient seen and examined. weak appearing. requiring high amount of PCA pump. pseudomonas bacteremia pansensitive      ROS:    [ ] Unobtainable because:  [ x] All other systems negative except as noted above            Allergies  No Known Allergies        ANTIMICROBIALS:  piperacillin/tazobactam IVPB.. 4.5 every 8 hours      OTHER MEDS:  acetaminophen     Tablet .. 650 milliGRAM(s) Oral every 6 hours PRN  aMIOdarone    Tablet 100 milliGRAM(s) Oral daily  chlorhexidine 4% Liquid 1 Application(s) Topical <User Schedule>  dexAMETHasone  Injectable 4 milliGRAM(s) IV Push two times a day  famotidine    Tablet 20 milliGRAM(s) Oral daily  melatonin 3 milliGRAM(s) Oral at bedtime PRN  midodrine. 5 milliGRAM(s) Oral three times a day  morphine  - Injectable 2 milliGRAM(s) IV Push every 1 hour PRN  morphine PCA (5 mG/mL) 30 milliLiter(s) PCA Continuous PCA Continuous  OLANZapine Disintegrating Tablet 5 milliGRAM(s) Oral at bedtime  senna Syrup 10 milliLiter(s) Oral at bedtime PRN  simethicone 80 milliGRAM(s) Chew daily PRN  sodium chloride 0.9% lock flush 10 milliLiter(s) IV Push every 1 hour PRN      Physical Exam:  Vital Signs Last 24 Hrs  T(C): 36.4 (30 Apr 2025 00:00), Max: 36.4 (29 Apr 2025 05:31)  T(F): 97.6 (30 Apr 2025 00:00), Max: 97.6 (29 Apr 2025 12:44)  HR: 80 (30 Apr 2025 00:00) (71 - 94)  BP: 96/61 (30 Apr 2025 00:00) (88/63 - 121/80)  BP(mean): --  RR: 18 (30 Apr 2025 00:00) (18 - 18)  SpO2: 95% (30 Apr 2025 00:00) (95% - 98%)    Parameters below as of 30 Apr 2025 00:00  Patient On (Oxygen Delivery Method): room air      Constitutional: ill appearing male   HEAD/EYES: anicteric, no conjunctival injection, pale appearing   ENT:  supple, no thrush  Cardiovascular:   normal S1, S2, no murmur, full body anasarca   Respiratory:  clear BS bilaterally, no wheezes, no rales  GI:  soft, tender, good bowel sounds, left ostomy, right urostomy with blood in bag, several surgical scars on abdomen, +PEG site c/d/i   :  no devine, urostomy with blood  Musculoskeletal:  very minimal movement of extremities   Neurologic: awake and alert, unable to assess strength  Skin:  no rash, no erythema, no phlebitis, pictures of sacral wound stage 4, very deep, right chest chemoport access without erythema or induration or tenderness     Psychiatric:  awake, alert, appropriate mood    WBC Count: 11.34 K/uL (04-29 @ 06:30)  WBC Count: 11.88 K/uL (04-28 @ 07:15)  WBC Count: 11.04 K/uL (04-27 @ 12:25)  WBC Count: 9.33 K/uL (04-26 @ 17:01)  WBC Count: 10.72 K/uL (04-26 @ 14:58)  WBC Count: 12.68 K/uL (04-25 @ 23:35)                            7.8    11.34 )-----------( 277      ( 29 Apr 2025 06:30 )             24.5       04-29    135  |  106  |  31[H]  ----------------------------<  170[H]  3.6   |  22  |  0.91    Ca    7.2[L]      29 Apr 2025 06:30    TPro  4.7[L]  /  Alb  1.1[L]  /  TBili  0.8  /  DBili  x   /  AST  16  /  ALT  7[L]  /  AlkPhos  112  04-28      Urinalysis Basic - ( 29 Apr 2025 06:30 )    Color: x / Appearance: x / SG: x / pH: x  Gluc: 170 mg/dL / Ketone: x  / Bili: x / Urobili: x   Blood: x / Protein: x / Nitrite: x   Leuk Esterase: x / RBC: x / WBC x   Sq Epi: x / Non Sq Epi: x / Bacteria: x      Creatinine Trend: 0.91<--, 0.85<--, 0.86<--, 0.78<--, 0.92<--      MICROBIOLOGY:  Blood Blood-Peripheral  04-26-25   No growth at 48 Hours  --  --      Blood Blood-Peripheral  04-26-25   Growth in aerobic bottle: Pseudomonas aeruginosa  Direct identification is available within approximately 3-5  hours either by Blood Panel Multiplexed PCR or Direct  MALDI-TOF. Details: [email or web address]  --  Blood Culture PCR  Pseudomonas aeruginosa      Clean Catch  04-26-25   >100,000 CFU/ml Pseudomonas aeruginosa  --  Pseudomonas aeruginosa    RADIOLOGY:  CT Abdomen and Pelvis No Cont (04.28.25 @ 13:02)  IMPRESSION:  Evaluation very limited without IV contrast as well as marked anasarca.    Extensive soft tissue masses with osseous destruction involving the   pelvis and presacral space, not well characterized without IV contrast.    Bilateral ureteral stent extending to the right lower quadrant urostomy.   Mild hyperdensity within the right renal collecting system, which could   represent blood products. No hydronephrosis or obstructive stone/mass   seen.

## 2025-04-29 NOTE — PROGRESS NOTE ADULT - ASSESSMENT
60 Y/O male with PMHx colorectal CA s/p colostomy creation, urethral stricture s/p urostomy bag and b/l retrograde nephrostomy, severe AS/bicuspid AV, TAA, HTN, HLD, pAF, sacral ulcer s/p wound bag presented with hematuria in urostomy bag.  Remains afebrile with leukocytosis. BCx reveal pseudomonas sensitive to Zosyn, s/p single dose of Amikacin per ID. UCx prelim pseudomonas.   H/H continues to decreased today from 9.2/27.5 to 7.8/24.5. Has received 2u PRBC since admission.       PLAN:     - Obtain CTAP with IV contrast to evaluate source of bleeding; obtain CT Urogram   - Obtain records from [facility] [place] urologist, Dr. John Keith  - Trend H/H, transfuse PRN  - Maintain hydration  - Continue antibiotics per ID; F/U final UCx   - Continue care per primary team; Discussed with Dr. Devi   - Discussed with Dr. Little  58 Y/O male with PMHx colorectal CA s/p colostomy creation, urethral stricture s/p urostomy bag and b/l retrograde nephrostomy, severe AS/bicuspid AV, TAA, HTN, HLD, pAF, sacral ulcer s/p wound bag presented with hematuria in urostomy bag.  Remains afebrile with leukocytosis. BCx reveal pseudomonas sensitive to Zosyn, s/p single dose of Amikacin per ID. UCx prelim pseudomonas.   H/H continues to decreased today from 9.2/27.5 to 7.8/24.5. Has received 2u PRBC since admission.   Daughter Rowan endorsed at bedside her strong desire to obtain source of bleeding, however, is unsure if patient and family would like to intervene depending on source.       PLAN:     - Obtain CTAP with IV contrast to evaluate source of bleeding; obtain CT Urogram   - Obt[place] from [facility] [place], Dr. John Keith  - Trend H/H, transfuse PRN  - Maintain hydration  - Continue antibiotics per ID; F/U final UCx   - Continue care per primary team; Discussed with Dr. Devi   - Discussed with Dr. Little

## 2025-04-29 NOTE — PROGRESS NOTE ADULT - ASSESSMENT
59M w/ PMHx of colorectal CA s/p colostomy on hospice care, urethral stricture s/p urostomy bag, severe AS/Bicuspid AV, TAA, HTN, HLD, pAfib, sacral ulcer s/p wound bag and right hip fracture who presents to the ED with bleeding from urostomy bag around 1830. History is obtained from daughter at bedside. Per daughter, there has been increased bleeding and clots in the urostomy bsg since yesterday. Patient has has history of multiple antibiotic resistance UTI. Patient currently denies any acute symptoms or concerns. Denies headache, dizziness, chest pain, palpitations, SOB, abdominal pain, joint pain, diarrhea/constipation, or urinary symptoms.  [place] [facility] [place] , found to have enterococcus bacteremia and possibly endocarditis. chemoport was kept in.   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 Shandra Denney, DO  Chief, Infectious Disease at [facility]  Reachable via [employer] Teams or ID office: [phone]  Weekdays: After 5pm, please call [phone] for all inquiries and new consults  Weekends: Message on-call infectious disease physician via teams (see Ange)

## 2025-04-29 NOTE — PROGRESS NOTE ADULT - SUBJECTIVE AND OBJECTIVE BOX
Patient seen and examined bedside with daughter Rowan resting comfortably.  No complaints offered.   Urostomy with dark, hematuria.   Denies N/V, chest pain, dyspnea, cough.    T(F): 97.5 (04-29-25 @ 05:31), Max: 97.6 (04-28-25 @ 16:02)  HR: 71 (04-29-25 @ 07:00) (71 - 106)  BP: 94/60 (04-29-25 @ 05:31) (94/60 - 110/70)  RR: 18 (04-29-25 @ 05:31) (18 - 18)  SpO2: 96% (04-29-25 @ 05:31) (95% - 99%)  Wt(kg): --  CAPILLARY BLOOD GLUCOSE          PHYSICAL EXAM:  General: NAD, alert and awake, significant anasarca   HEENT: NCAT, EOMI, conjunctiva clear  Chest: Nonlabored respirations, good inspiratory effort  Abdomen: Soft, NTND; ostomy pink   Extremities: Bilateral UE and LE with significant pitting edema   : Edematous phallus with serous weeping from glans with dehiscence at glans; urostomy with merlot hematuria within gravity pouch      LABS:                        7.8    11.34 )-----------( 277      ( 29 Apr 2025 06:30 )             24.5   04-29    135  |  106  |  31[H]  ----------------------------<  170[H]  3.6   |  22  |  0.91    Ca    7.2[L]      29 Apr 2025 06:30    TPro  4.7[L]  /  Alb  1.1[L]  /  TBili  0.8  /  DBili  x   /  AST  16  /  ALT  7[L]  /  AlkPhos  112  04-28    I&O's Detail    28 Apr 2025 07:01  -  29 Apr 2025 07:00  --------------------------------------------------------  IN:  Total IN: 0 mL    OUT:    Urostomy (mL): 950 mL  Total OUT: 950 mL    Total NET: -950 mL

## 2025-04-29 NOTE — PROGRESS NOTE ADULT - ASSESSMENT
59M w/ PMHx of colorectal CA s/p colostomy on hospice care, urethral stricture s/p urostomy bag, severe AS/Bicuspid AV, TAA, HTN, HLD, pAfib, sacral ulcer s/p wound bag and right hip fracture who presents to the ED with bleeding from urostomy bag. Admitted to medicine for further management and monitoring. Detailed plan as below:       Painless hematuria. and acute blood loss anemia:  Sepsis POA  Possibly 2/2 UTI vs invasive malignancy   CT with no stone, no hydro, stent in place  -Urine Clx with GNR, and Blood cultures with pseudomonas  -On Zosyn, dose increased , one dose amikacin given per ID  - transfused 2 units for acute blood loss anemia Hb 6.0  - Hb improved   - Hematuria recurred  - CT noted: Extensive invasive cancer with severe anasarca.   - Discussed with daughter at length , will farhan fox repeat Ct with contrast for now  - Goal is to treat infection and pain control and dc back to home hospice  - If family decides to pursue CT,  please reorder , only option if bleeding source identified then would be IR embolization if feasible   - ID and urology on board    Malignant Colorectal cancer:  colorectal CA s/p colostomy on hospice care  -[place]cologist[place]neudy [place]reras [place] [facility]   -[place]ist: [place] Isacc Keith at [facility]  -On home Morphine pump  and fentanyl patch 200 mcg / 72 hours   - palliative on board for pain management   -c/w home simethicone, famotidine, olanzapine 5 mg ODT QHS      Hypotension:  -s/p midodrine 10 mg PO in ED , will continue for now to allow room for pain med  -Closely monitor BP.    HLD:  Continue with home atorvastatin 20 mg QHS.    Paroxysmal atrial fibrillation.   -Continue with home amiodarone 100 mg QD   -No anticoagulation given hematuria       Sacral ulcer.   -Monitor wound bag  -Wound care consultation placed.    VTE PPx: SCD  Nutrition: DASH/TLC Diet  Fluids: PRN  Electrolytes: Maintain K>4, Mag >2, Phos > 3  Access: PIV    Pt is DNR/DNI with NIV trial ( see detailed form)    Dispo: pending clinical improvement. Tx process to [facility] in progress per family request , Awaiting acceptance, Multiple doctors including myself and ACP been in touch with [facility] [place] team. likely will not tx now as no definite treatment plan is in place.    palliative consulted for assistance with pain management.    Discussed at length with family at bedside. prognosis poor.

## 2025-04-29 NOTE — PROGRESS NOTE ADULT - SUBJECTIVE AND OBJECTIVE BOX
follow up on:  complex medical decision making related to goals of care      OVERNIGHT EVENTS: pt reports mild improvement in pain but required multiple morphine prn pushes overnight, unable to quantify level of pain. Family at bedside.     Review of systems:     Pain:  [x ] yes [ ] no -generalized, abdominal, unable to quantify  QOL impact -   Location -                    Aggravating factors -  Quality -  Radiation -  Timing-  Severity (0-10 scale):  Minimal acceptable level (0-10 scale):     Dyspnea:      denies                        Anxiety:         denies                      Depression:   denies  Fatigue:      denies                         Nausea:      denies                         Loss of appetite:    denies            Constipation:     denies             Diarrhea:     denies    All other systems reviewed and negative       MEDICATIONS  (STANDING):  aMIOdarone    Tablet 100 milliGRAM(s) Oral daily  chlorhexidine 4% Liquid 1 Application(s) Topical <User Schedule>  dexAMETHasone  Injectable 4 milliGRAM(s) IV Push two times a day  famotidine    Tablet 20 milliGRAM(s) Oral daily  midodrine. 5 milliGRAM(s) Oral three times a day  morphine PCA (5 mG/mL) 30 milliLiter(s) PCA Continuous PCA Continuous  OLANZapine Disintegrating Tablet 5 milliGRAM(s) Oral at bedtime  piperacillin/tazobactam IVPB.. 4.5 Gram(s) IV Intermittent every 8 hours    MEDICATIONS  (PRN):  acetaminophen     Tablet .. 650 milliGRAM(s) Oral every 6 hours PRN Temp greater or equal to 38C (100.4F), Mild Pain (1 - 3)  melatonin 3 milliGRAM(s) Oral at bedtime PRN Insomnia  morphine  - Injectable 2 milliGRAM(s) IV Push every 1 hour PRN Breakthrough pain  senna Syrup 10 milliLiter(s) Oral at bedtime PRN Constipation  simethicone 80 milliGRAM(s) Chew daily PRN Indigestion  sodium chloride 0.9% lock flush 10 milliLiter(s) IV Push every 1 hour PRN Pre/post blood products, medications, blood draw, and to maintain line patency      PHYSICAL EXAM:  Vital Signs Last 24 Hrs  T(C): 36.4 (29 Apr 2025 05:31), Max: 36.4 (28 Apr 2025 16:02)  T(F): 97.5 (29 Apr 2025 05:31), Max: 97.6 (28 Apr 2025 16:02)  HR: 71 (29 Apr 2025 07:00) (71 - 106)  BP: 94/60 (29 Apr 2025 05:31) (94/60 - 110/70)  BP(mean): --  RR: 18 (29 Apr 2025 05:31) (18 - 18)  SpO2: 96% (29 Apr 2025 05:31) (96% - 99%)    Parameters below as of 29 Apr 2025 05:31  Patient On (Oxygen Delivery Method): room air          Palliative Performance Scale/Karnofsky Score: 20  ECOG Performance: 4     GENERAL: alert, chronically ill appearing, cachectic, NAD  HEENT: Atraumatic, oropharynx clear, neck supple  CHEST/LUNG: unlabored  HEART: Regular rate and rhythm    ABDOMEN: Soft, mild diffuse tenderness, no rebound, +ventral hernia,  nondistended, PEG, colostomy  : urostomy w dark red urine  MUSCULOSKELETAL:  +anasarca, bedbound  NERVOUS SYSTEM:  alert, answers simple questions  SKIN: multiple decubiti noted -ischial, scrotal, sacral  Oral intake: poor    LABS:                          7.8    11.34 )-----------( 277      ( 29 Apr 2025 06:30 )             24.5     04-29    135  |  106  |  31[H]  ----------------------------<  170[H]  3.6   |  22  |  0.91    Ca    7.2[L]      29 Apr 2025 06:30    TPro  4.7[L]  /  Alb  1.1[L]  /  TBili  0.8  /  DBili  x   /  AST  16  /  ALT  7[L]  /  AlkPhos  112  04-28    Urinalysis Basic - ( 29 Apr 2025 06:30 )    Color: x / Appearance: x / SG: x / pH: x  Gluc: 170 mg/dL / Ketone: x  / Bili: x / Urobili: x   Blood: x / Protein: x / Nitrite: x   Leuk Esterase: x / RBC: x / WBC x   Sq Epi: x / Non Sq Epi: x / Bacteria: x        RADIOLOGY & ADDITIONAL STUDIES:

## 2025-04-29 NOTE — PROGRESS NOTE ADULT - ASSESSMENT
59M w/ PMHx of colorectal CA s/p colostomy,  venting PEG, s/p urostomy bag, severe AS/Bicuspid AV, TAA, HTN, HLD, pAfib, sacral ulcer s/p wound bag and B/L  hip fracture who presents to the ED with bleeding from urostomy bag. Pt was   admitted to [facility] 5 days  for home hospice  and was started on PCA Morphine for increased pain. Pt  presents to the ED with bleeding from urostomy bag.

## 2025-04-30 LAB
HCT VFR BLD CALC: 25.3 % — LOW (ref 39–50)
HGB BLD-MCNC: 8.2 G/DL — LOW (ref 13–17)
MCHC RBC-ENTMCNC: 28.5 PG — SIGNIFICANT CHANGE UP (ref 27–34)
MCHC RBC-ENTMCNC: 32.4 G/DL — SIGNIFICANT CHANGE UP (ref 32–36)
MCV RBC AUTO: 87.8 FL — SIGNIFICANT CHANGE UP (ref 80–100)
NRBC BLD AUTO-RTO: 0 /100 WBCS — SIGNIFICANT CHANGE UP (ref 0–0)
PLATELET # BLD AUTO: 302 K/UL — SIGNIFICANT CHANGE UP (ref 150–400)
RBC # BLD: 2.88 M/UL — LOW (ref 4.2–5.8)
RBC # FLD: 19.4 % — HIGH (ref 10.3–14.5)
WBC # BLD: 12.39 K/UL — HIGH (ref 3.8–10.5)
WBC # FLD AUTO: 12.39 K/UL — HIGH (ref 3.8–10.5)

## 2025-04-30 PROCEDURE — 99233 SBSQ HOSP IP/OBS HIGH 50: CPT | Mod: FS

## 2025-04-30 PROCEDURE — 99232 SBSQ HOSP IP/OBS MODERATE 35: CPT | Mod: GW

## 2025-04-30 PROCEDURE — 74174 CTA ABD&PLVS W/CONTRAST: CPT | Mod: 26

## 2025-04-30 PROCEDURE — 99233 SBSQ HOSP IP/OBS HIGH 50: CPT

## 2025-04-30 RX ORDER — CEFEPIME 2 G/20ML
2000 INJECTION, POWDER, FOR SOLUTION INTRAVENOUS ONCE
Refills: 0 | Status: COMPLETED | OUTPATIENT
Start: 2025-04-30 | End: 2025-04-30

## 2025-04-30 RX ORDER — CEFEPIME 2 G/20ML
2000 INJECTION, POWDER, FOR SOLUTION INTRAVENOUS EVERY 8 HOURS
Refills: 0 | Status: DISCONTINUED | OUTPATIENT
Start: 2025-04-30 | End: 2025-05-02

## 2025-04-30 RX ORDER — CEFEPIME 2 G/20ML
INJECTION, POWDER, FOR SOLUTION INTRAVENOUS
Refills: 0 | Status: DISCONTINUED | OUTPATIENT
Start: 2025-04-30 | End: 2025-05-02

## 2025-04-30 RX ADMIN — Medication 30 MILLILITER(S): at 08:39

## 2025-04-30 RX ADMIN — OLANZAPINE 5 MILLIGRAM(S): 10 TABLET ORAL at 21:32

## 2025-04-30 RX ADMIN — DEXAMETHASONE 4 MILLIGRAM(S): 0.5 TABLET ORAL at 06:07

## 2025-04-30 RX ADMIN — MIDODRINE HYDROCHLORIDE 5 MILLIGRAM(S): 5 TABLET ORAL at 06:07

## 2025-04-30 RX ADMIN — Medication 30 MILLILITER(S): at 14:54

## 2025-04-30 RX ADMIN — AMIODARONE HYDROCHLORIDE 100 MILLIGRAM(S): 50 INJECTION, SOLUTION INTRAVENOUS at 06:07

## 2025-04-30 RX ADMIN — DEXAMETHASONE 4 MILLIGRAM(S): 0.5 TABLET ORAL at 13:51

## 2025-04-30 RX ADMIN — Medication 30 MILLILITER(S): at 07:20

## 2025-04-30 RX ADMIN — Medication 20 MILLIGRAM(S): at 12:14

## 2025-04-30 RX ADMIN — Medication 25 GRAM(S): at 06:10

## 2025-04-30 RX ADMIN — CEFEPIME 100 MILLIGRAM(S): 2 INJECTION, POWDER, FOR SOLUTION INTRAVENOUS at 21:31

## 2025-04-30 RX ADMIN — CEFEPIME 100 MILLIGRAM(S): 2 INJECTION, POWDER, FOR SOLUTION INTRAVENOUS at 12:13

## 2025-04-30 RX ADMIN — Medication 1 APPLICATION(S): at 07:48

## 2025-04-30 RX ADMIN — MIDODRINE HYDROCHLORIDE 5 MILLIGRAM(S): 5 TABLET ORAL at 17:36

## 2025-04-30 RX ADMIN — Medication 30 MILLILITER(S): at 18:59

## 2025-04-30 RX ADMIN — MIDODRINE HYDROCHLORIDE 5 MILLIGRAM(S): 5 TABLET ORAL at 12:14

## 2025-04-30 NOTE — PROGRESS NOTE ADULT - SUBJECTIVE AND OBJECTIVE BOX
Patient is a 59y old  Male who presents with a chief complaint of Hematuria (30 Apr 2025 13:11)    INTERVAL HPI/OVERNIGHT EVENTS: No acute events overnight. HD stable.     MEDICATIONS  (STANDING):  aMIOdarone    Tablet 100 milliGRAM(s) Oral daily  cefepime   IVPB      cefepime   IVPB 2000 milliGRAM(s) IV Intermittent every 8 hours  dexAMETHasone  Injectable 4 milliGRAM(s) IV Push two times a day  famotidine    Tablet 20 milliGRAM(s) Oral daily  midodrine. 5 milliGRAM(s) Oral three times a day  morphine PCA (5 mG/mL) 30 milliLiter(s) PCA Continuous PCA Continuous  OLANZapine Disintegrating Tablet 5 milliGRAM(s) Oral at bedtime    MEDICATIONS  (PRN):  acetaminophen     Tablet .. 650 milliGRAM(s) Oral every 6 hours PRN Temp greater or equal to 38C (100.4F), Mild Pain (1 - 3)  melatonin 3 milliGRAM(s) Oral at bedtime PRN Insomnia  morphine  - Injectable 2 milliGRAM(s) IV Push every 1 hour PRN Breakthrough pain  senna Syrup 10 milliLiter(s) Oral at bedtime PRN Constipation  simethicone 80 milliGRAM(s) Chew daily PRN Indigestion  sodium chloride 0.9% lock flush 10 milliLiter(s) IV Push every 1 hour PRN Pre/post blood products, medications, blood draw, and to maintain line patency      Allergies    No Known Allergies    Intolerances        REVIEW OF SYSTEMS: all negative with exception of above    Vital Signs Last 24 Hrs  T(C): 36.4 (30 Apr 2025 11:19), Max: 36.4 (30 Apr 2025 00:00)  T(F): 97.6 (30 Apr 2025 11:19), Max: 97.6 (30 Apr 2025 00:00)  HR: 91 (30 Apr 2025 11:19) (80 - 94)  BP: 90/65 (30 Apr 2025 11:19) (90/65 - 96/65)  BP(mean): --  RR: 18 (30 Apr 2025 11:19) (18 - 18)  SpO2: 99% (30 Apr 2025 11:19) (95% - 99%)    Parameters below as of 30 Apr 2025 04:36  Patient On (Oxygen Delivery Method): room air    PHYSICAL EXAM:  GENERAL: NAD, well-groomed  NERVOUS SYSTEM:  Alert & Oriented X3, Good concentration; Motor Strength 5/5 B/L upper and lower extremities; DTRs 2+ intact and symmetric  CHEST/LUNG: Clear to percussion bilaterally; No rales, rhonchi, wheezing, or rubs  HEART: Regular rate and rhythm; No murmurs, rubs, or gallops  ABDOMEN: Soft, Nontender, Nondistended; Bowel sounds present  EXTREMITIES:  2+ Peripheral Pulses, No clubbing, cyanosis, or edema    LABS:                        8.2    12.39 )-----------( 302      ( 30 Apr 2025 06:30 )             25.3     04-29    135  |  106  |  31[H]  ----------------------------<  170[H]  3.6   |  22  |  0.91    Ca    7.2[L]      29 Apr 2025 06:30        Urinalysis Basic - ( 29 Apr 2025 06:30 )    Color: x / Appearance: x / SG: x / pH: x  Gluc: 170 mg/dL / Ketone: x  / Bili: x / Urobili: x   Blood: x / Protein: x / Nitrite: x   Leuk Esterase: x / RBC: x / WBC x   Sq Epi: x / Non Sq Epi: x / Bacteria: x      CAPILLARY BLOOD GLUCOSE          RADIOLOGY & ADDITIONAL TESTS:    Imaging Personally Reviewed:  [ ] YES  [ ] NO  < from: CT Angio Abdomen and Pelvis w/ IV Cont (04.30.25 @ 11:54) >  IMPRESSION: No evidence of active intraluminal extravasation of contrast.   No evidence of acute intraperitoneal or retroperitoneal hemorrhage.    --- End of Report ---      RIVER SPEARS MD  This document has been electronically signed. Apr 30 2025 12:34PM    < end of copied text >        Consultant(s) Notes Reviewed:  [ ] YES  [ ] NO    Care Discussed with Consultants/Other Providers [ ] YES  [ ] NO

## 2025-04-30 NOTE — PROGRESS NOTE ADULT - SUBJECTIVE AND OBJECTIVE BOX
follow up on:  complex medical decision making related to goals of care      OVERNIGHT EVENTS: pain better controlled per pt and family, pt decided to have CTA done today, tolerated well    Review of systems:     Pain:  [x ] yes [ ] no- better controlled today  QOL impact -   Location -                    Aggravating factors -  Quality -  Radiation -  Timing-  Severity (0-10 scale):  Minimal acceptable level (0-10 scale):     Dyspnea:      denies                        Anxiety:         denies                      Depression:   denies  Fatigue:      +                        Nausea:      denies                         Loss of appetite:    denies            Constipation:     denies             Diarrhea:     denies    All other systems reviewed and negative       MEDICATIONS  (STANDING):  aMIOdarone    Tablet 100 milliGRAM(s) Oral daily  cefepime   IVPB      cefepime   IVPB 2000 milliGRAM(s) IV Intermittent every 8 hours  dexAMETHasone  Injectable 4 milliGRAM(s) IV Push two times a day  famotidine    Tablet 20 milliGRAM(s) Oral daily  midodrine. 5 milliGRAM(s) Oral three times a day  morphine PCA (5 mG/mL) 30 milliLiter(s) PCA Continuous PCA Continuous  OLANZapine Disintegrating Tablet 5 milliGRAM(s) Oral at bedtime    MEDICATIONS  (PRN):  acetaminophen     Tablet .. 650 milliGRAM(s) Oral every 6 hours PRN Temp greater or equal to 38C (100.4F), Mild Pain (1 - 3)  melatonin 3 milliGRAM(s) Oral at bedtime PRN Insomnia  morphine  - Injectable 2 milliGRAM(s) IV Push every 1 hour PRN Breakthrough pain  senna Syrup 10 milliLiter(s) Oral at bedtime PRN Constipation  simethicone 80 milliGRAM(s) Chew daily PRN Indigestion  sodium chloride 0.9% lock flush 10 milliLiter(s) IV Push every 1 hour PRN Pre/post blood products, medications, blood draw, and to maintain line patency      PHYSICAL EXAM:  Vital Signs Last 24 Hrs  T(C): 36.4 (30 Apr 2025 11:19), Max: 36.4 (29 Apr 2025 16:29)  T(F): 97.6 (30 Apr 2025 11:19), Max: 97.6 (29 Apr 2025 16:29)  HR: 91 (30 Apr 2025 11:19) (76 - 94)  BP: 90/65 (30 Apr 2025 11:19) (88/63 - 121/80)  BP(mean): --  RR: 18 (30 Apr 2025 11:19) (18 - 18)  SpO2: 99% (30 Apr 2025 11:19) (95% - 99%)    Parameters below as of 30 Apr 2025 04:36  Patient On (Oxygen Delivery Method): room air       Palliative Performance Scale/Karnofsky Score: 20  ECOG Performance: 4     GENERAL: alert, chronically ill appearing, cachectic, NAD  HEENT: Atraumatic, oropharynx clear, neck supple  CHEST/LUNG: unlabored  HEART: Regular rate and rhythm    ABDOMEN: Soft, mild diffuse tenderness, no rebound, +ventral hernia,  nondistended, PEG, colostomy  : R urostomy w dark red urine, L urostomy w yellow urine   MUSCULOSKELETAL:  +anasarca, bedbound  NERVOUS SYSTEM:  alert, answers simple questions  SKIN: multiple decubiti noted -ischial, scrotal, sacral  Oral intake: poor    LABS:                          8.2    12.39 )-----------( 302      ( 30 Apr 2025 06:30 )             25.3     04-29    135  |  106  |  31[H]  ----------------------------<  170[H]  3.6   |  22  |  0.91    Ca    7.2[L]      29 Apr 2025 06:30      Urinalysis Basic - ( 29 Apr 2025 06:30 )    Color: x / Appearance: x / SG: x / pH: x  Gluc: 170 mg/dL / Ketone: x  / Bili: x / Urobili: x   Blood: x / Protein: x / Nitrite: x   Leuk Esterase: x / RBC: x / WBC x   Sq Epi: x / Non Sq Epi: x / Bacteria: x        RADIOLOGY & ADDITIONAL STUDIES:

## 2025-04-30 NOTE — PROGRESS NOTE ADULT - ASSESSMENT
59M w/ PMHx of colorectal CA s/p colostomy on hospice care, urethral stricture s/p urostomy bag, severe AS/Bicuspid AV, TAA, HTN, HLD, pAfib, sacral ulcer s/p wound bag and right hip fracture who presents to the ED with bleeding from urostomy bag. Admitted to medicine for further management and monitoring. Detailed plan as below:     Painless hematuria. and acute blood loss anemia:  Sepsis POA  Possibly 2/2 UTI vs invasive malignancy   CT with no stone, no hydro, stent in place  -Urine Clx with GNR, and Blood cultures with pseudomonas  -On Zosyn, dose increased , one dose amikacin given per ID  - transfused 2 units for acute blood loss anemia Hb 6.0  - Hb improved   - Hematuria recurred  - CT noted: Extensive invasive cancer with severe anasarca.   - Discussed with daughter at length , will farhan fox repeat Ct with contrast for now  - Goal is to treat infection and pain control and dc back to home hospice  - CTA A/P ordered  - ID and urology on board  - C/w Cefepime  - will obtain records from [facility] [place], Dr. John Keith    Malignant Colorectal cancer:  colorectal CA s/p colostomy on hospice care  -Oncologist:: Dr Aneudy Contreras at [facility]   -Urologist: Dr Isacc Keith at [facility]  -On home Morphine pump  and fentanyl patch 200 mcg / 72 hours   - palliative on board for pain management   -c/w home simethicone, famotidine, olanzapine 5 mg ODT QHS      Hypotension:  -s/p midodrine 10 mg PO in ED , will continue for now to allow room for pain med  -Closely monitor BP.    HLD:  Continue with home atorvastatin 20 mg QHS.    Paroxysmal atrial fibrillation.   -Continue with home amiodarone 100 mg QD   -No anticoagulation given hematuria       Sacral ulcer.   -Monitor wound bag  -Wound care consultation placed.    VTE PPx: SCD  Nutrition: DASH/TLC Diet  Fluids: PRN  Electrolytes: Maintain K>4, Mag >2, Phos > 3  Access: PIV    Pt is DNR/DNI with NIV trial ( see detailed form)    Dispo: pending clinical improvement.  palliative consulted for assistance with pain management.    Discussed at length with family at bedside. prognosis poor.

## 2025-04-30 NOTE — PROGRESS NOTE ADULT - SUBJECTIVE AND OBJECTIVE BOX
UROLOGY DAILY PROGRESS NOTE:     Subjective: Patient seen and examined at bedside. states feels weakness. No overnight events.       Objective:  Vital signs  T(F): , Max: 97.6 (04-29-25 @ 12:44)  HR: 80 (04-30-25 @ 04:36)  BP: 96/65 (04-30-25 @ 04:36)  SpO2: 99% (04-30-25 @ 04:36)  Wt(kg): --    I&O's Summary    30 Apr 2025 07:01  -  30 Apr 2025 10:27  --------------------------------------------------------  IN: 0 mL / OUT: 1000 mL / NET: -1000 mL        Gen: NAD  Pulm: No respiratory distress, no subcostal retractions  CV: RRR, no JVD  Abd: Soft, NT, ND  : urostomy with merlot hematuria within gravity pouch.    Labs:  04-30  12.39 / 25.3  /x      04-29  11.34 / 24.5  /0.91                           8.2    12.39 )-----------( 302      ( 30 Apr 2025 06:30 )             25.3     04-29    135  |  106  |  31[H]  ----------------------------<  170[H]  3.6   |  22  |  0.91    Ca    7.2[L]      29 Apr 2025 06:30          Urine Cx:   UROLOGY DAILY PROGRESS NOTE:     Subjective: Patient seen and examined at bedside. states feels weakness. No overnight events.       Objective:  Vital signs  T(F): , Max: 97.6 (04-29-25 @ 12:44)  HR: 80 (04-30-25 @ 04:36)  BP: 96/65 (04-30-25 @ 04:36)  SpO2: 99% (04-30-25 @ 04:36)  Wt(kg): --    I&O's Summary    30 Apr 2025 07:01  -  30 Apr 2025 10:27  --------------------------------------------------------  IN: 0 mL / OUT: 1000 mL / NET: -1000 mL        Gen: NAD  Pulm: No respiratory distress, no subcostal retractions  CV: RRR, no JVD  Abd: Soft, NT, ND  : urostomy with merlot hematuria within gravity pouch.    Labs:  04-30  12.39 / 25.3  /x      04-29  11.34 / 24.5  /0.91                           8.2    12.39 )-----------( 302      ( 30 Apr 2025 06:30 )             25.3     04-29    135  |  106  |  31[H]  ----------------------------<  170[H]  3.6   |  22  |  0.91    Ca    7.2[L]      29 Apr 2025 06:30          Urine Cx:  Culture - Urine (04.26.25 @ 03:47)   - Amikacin: S <=16  - Aztreonam: S <=4  - Cefepime: S <=2  - Ceftazidime: S 4  - Ciprofloxacin: S <=0.25  - Imipenem: S <=1  - Levofloxacin: S <=0.5  - Meropenem: S <=1  - Piperacillin/Tazobactam: S <=8  Specimen Source: Clean Catch  Culture Results:   >100,000 CFU/ml Pseudomonas aeruginosa  Organism Identification: Pseudomonas aeruginosa  Organism: Pseudomonas aeruginosa  Method Type: GERRY

## 2025-04-30 NOTE — PROGRESS NOTE ADULT - ASSESSMENT
58 Y/O male with PMHx colorectal CA s/p colostomy creation, urethral stricture s/p urostomy bag and b/l retrograde nephrostomy, severe AS/bicuspid AV, TAA, HTN, HLD, pAF, sacral ulcer s/p wound bag presented with hematuria in urostomy bag.  Remains afebrile with leukocytosis. BCx reveal pseudomonas sensitive to Zosyn, s/p single dose of Amikacin per ID. UCx prelim pseudomonas.   WBC 12 from 11, H/H improved 8/25 from 7/24, received 3u RPBC since admission.  Unable to perform CT urogram yesterday due to unable to tolerant IV contrast. Will obtain today if patient able to tolerant    Plan:  - Obtain CT urogram to evaluate source of bleeding  - Obtain records from [facility] [place] urologist, Dr. John Keith  - Trend H/H, transfuse PRN  - Maintain hydration  - Continue antibiotics per ID; F/U final UCx   - Continue care per primary team    - Pending final discussion with attending 60 Y/O male with PMHx colorectal CA s/p colostomy creation, urethral stricture s/p urostomy bag and b/l retrograde nephrostomy, severe AS/bicuspid AV, TAA, HTN, HLD, pAF, sacral ulcer s/p wound bag presented with hematuria in urostomy bag.  Remains afebrile with leukocytosis. BCx reveal pseudomonas sensitive to Zosyn, s/p single dose of Amikacin per ID. UCx prelim pseudomonas.   WBC 12 from 11, H/H improved 8/25 from 7/24, received 3u RPBC since admission. Urine culture with Pseudomonas aeruginosa    Plan:  - Obtain CT angio to evaluate source of bleeding  - Obtain records from [facility] [place]rologist, Dr. John Keith  - Trend H/H, transfuse PRN  - Maintain hydration  - Continue antibiotics per ID  - Continue care per primary team  - Discussed with Dr. Hanna

## 2025-04-30 NOTE — PROGRESS NOTE ADULT - ASSESSMENT
59M w/ PMHx of colorectal CA s/p colostomy,  venting PEG, s/p urostomy bag, severe AS/Bicuspid AV, TAA, HTN, HLD, pAfib, sacral ulcer s/p wound bag and B/L  hip fracture who presents to the ED with bleeding from urostomy bag. Pt was   admitted to [facility] 5 days  for home hospice  and was started on PCA Morphine for increased pain. Pt  presents to the ED with bleeding from urostomy bag.

## 2025-05-01 DIAGNOSIS — H53.8 OTHER VISUAL DISTURBANCES: ICD-10-CM

## 2025-05-01 LAB
ALBUMIN SERPL ELPH-MCNC: 1.3 G/DL — LOW (ref 3.3–5)
ALP SERPL-CCNC: 79 U/L — SIGNIFICANT CHANGE UP (ref 40–120)
ALT FLD-CCNC: 8 U/L — LOW (ref 12–78)
ANION GAP SERPL CALC-SCNC: 9 MMOL/L — SIGNIFICANT CHANGE UP (ref 5–17)
AST SERPL-CCNC: 13 U/L — LOW (ref 15–37)
BILIRUB SERPL-MCNC: 0.6 MG/DL — SIGNIFICANT CHANGE UP (ref 0.2–1.2)
BUN SERPL-MCNC: 30 MG/DL — HIGH (ref 7–23)
CALCIUM SERPL-MCNC: 7.1 MG/DL — LOW (ref 8.5–10.1)
CHLORIDE SERPL-SCNC: 105 MMOL/L — SIGNIFICANT CHANGE UP (ref 96–108)
CK MB BLD-MCNC: <4 % — HIGH (ref 0–3.5)
CK MB CFR SERPL CALC: <1 NG/ML — SIGNIFICANT CHANGE UP (ref 0.5–3.6)
CK SERPL-CCNC: 25 U/L — LOW (ref 26–308)
CO2 SERPL-SCNC: 21 MMOL/L — LOW (ref 22–31)
CREAT SERPL-MCNC: 0.92 MG/DL — SIGNIFICANT CHANGE UP (ref 0.5–1.3)
EGFR: 96 ML/MIN/1.73M2 — SIGNIFICANT CHANGE UP
EGFR: 96 ML/MIN/1.73M2 — SIGNIFICANT CHANGE UP
GLUCOSE BLDC GLUCOMTR-MCNC: 122 MG/DL — HIGH (ref 70–99)
GLUCOSE SERPL-MCNC: 110 MG/DL — HIGH (ref 70–99)
HCT VFR BLD CALC: 25.7 % — LOW (ref 39–50)
HGB BLD-MCNC: 8.2 G/DL — LOW (ref 13–17)
MCHC RBC-ENTMCNC: 28.9 PG — SIGNIFICANT CHANGE UP (ref 27–34)
MCHC RBC-ENTMCNC: 31.9 G/DL — LOW (ref 32–36)
MCV RBC AUTO: 90.5 FL — SIGNIFICANT CHANGE UP (ref 80–100)
NRBC BLD AUTO-RTO: 0 /100 WBCS — SIGNIFICANT CHANGE UP (ref 0–0)
PLATELET # BLD AUTO: 255 K/UL — SIGNIFICANT CHANGE UP (ref 150–400)
POTASSIUM SERPL-MCNC: 3.5 MMOL/L — SIGNIFICANT CHANGE UP (ref 3.5–5.3)
POTASSIUM SERPL-SCNC: 3.5 MMOL/L — SIGNIFICANT CHANGE UP (ref 3.5–5.3)
PROT SERPL-MCNC: 4.8 GM/DL — LOW (ref 6–8.3)
RBC # BLD: 2.84 M/UL — LOW (ref 4.2–5.8)
RBC # FLD: 19.7 % — HIGH (ref 10.3–14.5)
SODIUM SERPL-SCNC: 135 MMOL/L — SIGNIFICANT CHANGE UP (ref 135–145)
TROPONIN I, HIGH SENSITIVITY RESULT: 9.7 NG/L — SIGNIFICANT CHANGE UP
WBC # BLD: 12.25 K/UL — HIGH (ref 3.8–10.5)
WBC # FLD AUTO: 12.25 K/UL — HIGH (ref 3.8–10.5)

## 2025-05-01 PROCEDURE — 36410 VNPNXR 3YR/> PHY/QHP DX/THER: CPT | Mod: GW

## 2025-05-01 PROCEDURE — 99233 SBSQ HOSP IP/OBS HIGH 50: CPT | Mod: GW

## 2025-05-01 PROCEDURE — 70450 CT HEAD/BRAIN W/O DYE: CPT | Mod: 26

## 2025-05-01 PROCEDURE — 93010 ELECTROCARDIOGRAM REPORT: CPT

## 2025-05-01 PROCEDURE — 36000 PLACE NEEDLE IN VEIN: CPT

## 2025-05-01 PROCEDURE — 76937 US GUIDE VASCULAR ACCESS: CPT | Mod: 26,GW

## 2025-05-01 PROCEDURE — 99223 1ST HOSP IP/OBS HIGH 75: CPT | Mod: GC

## 2025-05-01 PROCEDURE — 99223 1ST HOSP IP/OBS HIGH 75: CPT | Mod: GW

## 2025-05-01 PROCEDURE — 71250 CT THORAX DX C-: CPT | Mod: 26

## 2025-05-01 PROCEDURE — 99232 SBSQ HOSP IP/OBS MODERATE 35: CPT

## 2025-05-01 PROCEDURE — 99291 CRITICAL CARE FIRST HOUR: CPT | Mod: FS,GW

## 2025-05-01 RX ORDER — AMIODARONE HYDROCHLORIDE 50 MG/ML
200 INJECTION, SOLUTION INTRAVENOUS DAILY
Refills: 0 | Status: DISCONTINUED | OUTPATIENT
Start: 2025-05-01 | End: 2025-05-06

## 2025-05-01 RX ORDER — METOPROLOL SUCCINATE 50 MG/1
5 TABLET, EXTENDED RELEASE ORAL ONCE
Refills: 0 | Status: COMPLETED | OUTPATIENT
Start: 2025-05-01 | End: 2025-05-01

## 2025-05-01 RX ORDER — CARVEDILOL 3.12 MG/1
3.12 TABLET, FILM COATED ORAL EVERY 12 HOURS
Refills: 0 | Status: DISCONTINUED | OUTPATIENT
Start: 2025-05-01 | End: 2025-05-06

## 2025-05-01 RX ADMIN — DEXAMETHASONE 4 MILLIGRAM(S): 0.5 TABLET ORAL at 05:58

## 2025-05-01 RX ADMIN — Medication 30 MILLILITER(S): at 07:19

## 2025-05-01 RX ADMIN — Medication 20 MILLIGRAM(S): at 11:52

## 2025-05-01 RX ADMIN — OLANZAPINE 5 MILLIGRAM(S): 10 TABLET ORAL at 22:37

## 2025-05-01 RX ADMIN — CEFEPIME 100 MILLIGRAM(S): 2 INJECTION, POWDER, FOR SOLUTION INTRAVENOUS at 17:39

## 2025-05-01 RX ADMIN — CEFEPIME 100 MILLIGRAM(S): 2 INJECTION, POWDER, FOR SOLUTION INTRAVENOUS at 05:58

## 2025-05-01 RX ADMIN — MIDODRINE HYDROCHLORIDE 5 MILLIGRAM(S): 5 TABLET ORAL at 17:43

## 2025-05-01 RX ADMIN — CEFEPIME 100 MILLIGRAM(S): 2 INJECTION, POWDER, FOR SOLUTION INTRAVENOUS at 22:37

## 2025-05-01 RX ADMIN — Medication 1 APPLICATION(S): at 11:20

## 2025-05-01 RX ADMIN — MIDODRINE HYDROCHLORIDE 5 MILLIGRAM(S): 5 TABLET ORAL at 11:53

## 2025-05-01 RX ADMIN — Medication 30 MILLILITER(S): at 06:36

## 2025-05-01 RX ADMIN — DEXAMETHASONE 4 MILLIGRAM(S): 0.5 TABLET ORAL at 15:39

## 2025-05-01 RX ADMIN — Medication 80 MILLIGRAM(S): at 23:53

## 2025-05-01 RX ADMIN — MIDODRINE HYDROCHLORIDE 5 MILLIGRAM(S): 5 TABLET ORAL at 05:59

## 2025-05-01 RX ADMIN — Medication 30 MILLILITER(S): at 20:30

## 2025-05-01 RX ADMIN — Medication 30 MILLILITER(S): at 19:37

## 2025-05-01 RX ADMIN — AMIODARONE HYDROCHLORIDE 100 MILLIGRAM(S): 50 INJECTION, SOLUTION INTRAVENOUS at 06:00

## 2025-05-01 NOTE — PROGRESS NOTE ADULT - ASSESSMENT
59M w/ PMHx of colorectal CA s/p colostomy,  venting PEG, s/p urostomy bag, severe AS/Bicuspid AV, TAA, HTN, HLD, pAfib, sacral ulcer s/p wound bag and B/L  hip fracture who presents to the ED with bleeding from urostomy bag. Pt was   admitted to [facility] 5 days  for home hospice  and was started on PCA Morphine for increased pain. Pt  presents to the ED with bleeding from urostomy bag.

## 2025-05-01 NOTE — CONSULT NOTE ADULT - ASSESSMENT
59M w/ PMHx of colorectal CA s/p colostomy on hospice care, urethral stricture s/p urostomy bag, severe AS/Bicuspid AV, TAA, HTN, HLD, pAfib, sacral ulcer s/p wound bag and right hip fracture who presents to the ED with bleeding from urostomy bag around 1830. History is obtained from daughter at bedside. Per daughter, there has been increased bleeding and clots in the urostomy bsg since yesterday. Patient has has history of multiple antibiotic resistance UTI.  Hospital course complicated by acute blood loss anemia requiring Eliquis to be held  Stroke code called for blurry vision, pt also with chest pain and complaints of "not feeling right"  NIHSS 10  o/e diffusely weak, no clear lateralizing deficit or neuro focality - visual fields intact but states vision is still blurry    Low suspicion for stroke - suspect related to tachycardia, rapid afib. Cannot rule out minor stroke due AFib and hypercoaguability of malignancy off of AC  - CTH now  - defer CTA H/N as no LVO signs on exam and not thrombectomy candidate  - would not treat with tnk as no clear focality matching a particular vascular distribution and acute blood loss anemia  - suggest revisiting palliative care recommendations  - pain and symptomatic control  - mri brain unlikely to change mgmt at this time  - can resume Eliquis when able per primary team

## 2025-05-01 NOTE — CONSULT NOTE ADULT - SUBJECTIVE AND OBJECTIVE BOX
HPI:  59M w/ PMHx of colorectal CA s/p colostomy on hospice care, urethral stricture s/p urostomy bag, severe AS/Bicuspid AV, TAA, HTN, HLD, pAfib, sacral ulcer s/p wound bag and right hip fracture who presents to the ED with bleeding from urostomy bag around 1830. History is obtained from daughter at bedside. Per daughter, there has been increased bleeding and clots in the urostomy bsg since yesterday. Patient has has history of multiple antibiotic resistance UTI. Patient currently denies any acute symptoms or concerns. Denies headache, dizziness, chest pain, palpitations, SOB, abdominal pain, joint pain, diarrhea/constipation, or urinary symptoms.   (26 Apr 2025 06:00)      PAST MEDICAL & SURGICAL HISTORY:  Rectal cancer      Essential hypertension      HLD (hyperlipidemia)      Chronic atrial fibrillation      H/O aortic valve stenosis      Thoracic aortic aneurysm (TAA)      Sacral ulcer      History of rectal surgery      S/P colostomy      History of urostomy          Allergies    No Known Allergies    Intolerances        MEDICATIONS  (STANDING):  aMIOdarone    Tablet 100 milliGRAM(s) Oral daily  carvedilol 3.125 milliGRAM(s) Oral every 12 hours  cefepime   IVPB      cefepime   IVPB 2000 milliGRAM(s) IV Intermittent every 8 hours  chlorhexidine 2% Cloths 1 Application(s) Topical daily  dexAMETHasone  Injectable 4 milliGRAM(s) IV Push two times a day  famotidine    Tablet 20 milliGRAM(s) Oral daily  metoprolol tartrate Injectable 5 milliGRAM(s) IV Push once  midodrine. 5 milliGRAM(s) Oral three times a day  morphine PCA (5 mG/mL) 30 milliLiter(s) PCA Continuous PCA Continuous  OLANZapine Disintegrating Tablet 5 milliGRAM(s) Oral at bedtime    MEDICATIONS  (PRN):  acetaminophen     Tablet .. 650 milliGRAM(s) Oral every 6 hours PRN Temp greater or equal to 38C (100.4F), Mild Pain (1 - 3)  melatonin 3 milliGRAM(s) Oral at bedtime PRN Insomnia  morphine  - Injectable 2 milliGRAM(s) IV Push every 1 hour PRN Breakthrough pain  senna Syrup 10 milliLiter(s) Oral at bedtime PRN Constipation  simethicone 80 milliGRAM(s) Chew daily PRN Indigestion  sodium chloride 0.9% lock flush 10 milliLiter(s) IV Push every 1 hour PRN Pre/post blood products, medications, blood draw, and to maintain line patency      FAMILY HISTORY:      SOCIAL HISTORY: No EtOH, no tobacco    REVIEW OF SYSTEMS:    CONSTITUTIONAL: No weakness, fevers or chills  EYES/ENT: No visual changes;  No vertigo or throat pain   NECK: No pain or stiffness  RESPIRATORY: No cough, wheezing, hemoptysis; No shortness of breath  CARDIOVASCULAR: No chest pain or palpitations  GASTROINTESTINAL: No abdominal or epigastric pain. No nausea, vomiting, or hematemesis; No diarrhea or constipation. No melena or hematochezia.  GENITOURINARY: No dysuria, frequency or hematuria  NEUROLOGICAL: No numbness or weakness  SKIN: No itching, burning, rashes, or lesions   All other review of systems is negative unless indicated above.        T(F): 97.3 (05-01-25 @ 13:16), Max: 98.5 (05-01-25 @ 12:29)  HR: 127 (05-01-25 @ 13:16)  BP: 104/78 (05-01-25 @ 13:16)  RR: 20 (05-01-25 @ 13:16)  SpO2: 100% (05-01-25 @ 13:16)  Wt(kg): --    GENERAL: NAD, well-developed  HEAD:  Atraumatic, Normocephalic  EYES: EOMI, PERRLA, conjunctiva and sclera clear  NECK: Supple, No JVD  CHEST/LUNG: Clear to auscultation bilaterally; No wheeze  HEART: Regular rate and rhythm; No murmurs, rubs, or gallops  ABDOMEN: Soft, Nontender, Nondistended; Bowel sounds present  EXTREMITIES:  2+ Peripheral Pulses, No clubbing, cyanosis, or edema  NEUROLOGY: non-focal  SKIN: No rashes or lesions                          8.2    12.25 )-----------( 255      ( 01 May 2025 06:18 )             25.7       05-01    135  |  105  |  30[H]  ----------------------------<  110[H]  3.5   |  21[L]  |  0.92    Ca    7.1[L]      01 May 2025 06:18    TPro  4.8[L]  /  Alb  1.3[L]  /  TBili  0.6  /  DBili  x   /  AST  13[L]  /  ALT  8[L]  /  AlkPhos  79  05-01           HPI:  59M w/ PMHx of colorectal CA s/p colostomy on hospice care, urethral stricture s/p urostomy bag, severe AS/Bicuspid AV, TAA, HTN, HLD, pAfib, sacral ulcer s/p wound bag and right hip fracture who presents to the ED with bleeding from urostomy bag around 1830. History is obtained from daughter at bedside. Per daughter, there has been increased bleeding and clots in the urostomy bsg since yesterday. Patient has has history of multiple antibiotic resistance UTI. Patient currently denies any acute symptoms or concerns. Denies headache, dizziness, chest pain, palpitations, SOB, abdominal pain, joint pain, diarrhea/constipation, or urinary symptoms.   (26 Apr 2025 06:00)      PAST MEDICAL & SURGICAL HISTORY:  Rectal cancer      Essential hypertension      HLD (hyperlipidemia)      Chronic atrial fibrillation      H/O aortic valve stenosis      Thoracic aortic aneurysm (TAA)      Sacral ulcer      History of rectal surgery      S/P colostomy      History of urostomy          Allergies    No Known Allergies    Intolerances        MEDICATIONS  (STANDING):  aMIOdarone    Tablet 100 milliGRAM(s) Oral daily  carvedilol 3.125 milliGRAM(s) Oral every 12 hours  cefepime   IVPB      cefepime   IVPB 2000 milliGRAM(s) IV Intermittent every 8 hours  chlorhexidine 2% Cloths 1 Application(s) Topical daily  dexAMETHasone  Injectable 4 milliGRAM(s) IV Push two times a day  famotidine    Tablet 20 milliGRAM(s) Oral daily  metoprolol tartrate Injectable 5 milliGRAM(s) IV Push once  midodrine. 5 milliGRAM(s) Oral three times a day  morphine PCA (5 mG/mL) 30 milliLiter(s) PCA Continuous PCA Continuous  OLANZapine Disintegrating Tablet 5 milliGRAM(s) Oral at bedtime    MEDICATIONS  (PRN):  acetaminophen     Tablet .. 650 milliGRAM(s) Oral every 6 hours PRN Temp greater or equal to 38C (100.4F), Mild Pain (1 - 3)  melatonin 3 milliGRAM(s) Oral at bedtime PRN Insomnia  morphine  - Injectable 2 milliGRAM(s) IV Push every 1 hour PRN Breakthrough pain  senna Syrup 10 milliLiter(s) Oral at bedtime PRN Constipation  simethicone 80 milliGRAM(s) Chew daily PRN Indigestion  sodium chloride 0.9% lock flush 10 milliLiter(s) IV Push every 1 hour PRN Pre/post blood products, medications, blood draw, and to maintain line patency      FAMILY HISTORY:      SOCIAL HISTORY: No EtOH, no tobacco    REVIEW OF SYSTEMS:    CONSTITUTIONAL: No weakness, fevers or chills  EYES/ENT: No visual changes;  No vertigo or throat pain   NECK: No pain or stiffness  RESPIRATORY: No cough, wheezing, hemoptysis; No shortness of breath  CARDIOVASCULAR: No chest pain or palpitations  GASTROINTESTINAL: No abdominal or epigastric pain. No nausea, vomiting, or hematemesis; No diarrhea or constipation. No melena or hematochezia.  GENITOURINARY: No dysuria, frequency or hematuria  NEUROLOGICAL: No numbness or weakness  SKIN: No itching, burning, rashes, or lesions   All other review of systems is negative unless indicated above.        T(F): 97.3 (05-01-25 @ 13:16), Max: 98.5 (05-01-25 @ 12:29)  HR: 127 (05-01-25 @ 13:16)  BP: 104/78 (05-01-25 @ 13:16)  RR: 20 (05-01-25 @ 13:16)  SpO2: 100% (05-01-25 @ 13:16)  Wt(kg): --    GENERAL: physical examination not performed patient is an audio only telehealth visit                          8.2    12.25 )-----------( 255      ( 01 May 2025 06:18 )             25.7       05-01    135  |  105  |  30[H]  ----------------------------<  110[H]  3.5   |  21[L]  |  0.92    Ca    7.1[L]      01 May 2025 06:18    TPro  4.8[L]  /  Alb  1.3[L]  /  TBili  0.6  /  DBili  x   /  AST  13[L]  /  ALT  8[L]  /  AlkPhos  79  05-01

## 2025-05-01 NOTE — RAPID RESPONSE TEAM SUMMARY - NSADDTLFINDINGSRRT_GEN_ALL_CORE
CT Head ordered for acute changes /83  HR: 130  O2: 98 on Room Air Code Stroke initiated immediately after rapid was called  Patient with c/o of blurry vision and darkness.   Patient seen and examined by neurologist Dr. Espana, would not treat with TNK    /83  HR: 130  O2: 98 on Room Air    CTA H/N deferred at this time

## 2025-05-01 NOTE — PROGRESS NOTE ADULT - PROBLEM SELECTOR PLAN 6
Had lengthy discussion with daughter and wife today at bedside in attempt to finer delineate GOC. Both struggle emotionally as pt condition continues to decline. They acknowledge that pt is approaching EOL, clearly verbalize their wishes to maintain pt comfort for the duration of pt life. Daughter States that family would like to keep pt home if possible. Discussed a hospice approach moving forward which would NOT include frequent radiological testing and transfers back and forth to hospital but rather a change of trajectory to a focus on good pain and symptom management with the goal of remaining outside of a hospital. Discussed possible initial transfer to inpt hospice until pt symptoms stabilized with transition home at that time if possible. Daughter states that she spoke with her team at [facility] who recommend the same. Family is agreeable to hospice POC, will discuss options for location and make decision later today.      called for prayer and support as family gains much comfort from such.              MOLST on file for DNR/DNI. Recently enrolled in [facility] hospice.   f/u ID recs regarding length of antibiotics course Had lengthy discussion with daughter and wife today at bedside in attempt to finer delineate GOC. Both struggle emotionally as pt condition continues to decline. They acknowledge that pt is approaching EOL, clearly verbalize their wishes to maintain pt comfort for the duration of pt life. Daughter States that family would like to keep pt home if possible. Discussed a hospice approach moving forward which would NOT include frequent radiological testing and transfers back and forth to hospital but rather a change of trajectory to a focus on good pain and symptom management with the goal of remaining outside of a hospital. Discussed possible initial transfer to inpt hospice until pt symptoms stabilized with transition home at that time if possible. Daughter states that she spoke with her team at [facility] who recommend the same. Family is agreeable to hospice POC, will discuss options for location and make decision later today.      called for prayer and support as family gains much comfort from such.        Comfort cart provided to family at bedside   Pt to be tansferred off telemtry      MOLST on file for DNR/DNI. Recently enrolled in [facility] hospice.   f/u ID recs regarding length of antibiotics course

## 2025-05-01 NOTE — PROCEDURE NOTE - ADDITIONAL PROCEDURE DETAILS
ill pt requiring PIV access for medical management. Under US guidance identified Left UE vein, and successfully placed 20g x 10cm powerglide into vessel.  Placement confirmed s/p with ultrasound and catheter determined to be in patent lumen of vein. Pt tolerated well w/o complication.
ill pt requiring PIV access for medical management. Under US guidance identified Right UE vein and successfully placed 20g x 10cm powerglide into vessel.  Placement confirmed s/p with ultrasound and catheter determined to be in patent lumen of vein. Pt tolerated well w/o complication.

## 2025-05-01 NOTE — RAPID RESPONSE TEAM SUMMARY - NSOTHERINTERVENTIONSRRT_GEN_ALL_CORE
CT Chest ordered CT Head, Mild chronic microvascular changes w/o evidence of an acute transcortical infarction or hemorrhage  Cardiac Enzymes  CT Chest ordered CT Head, Mild chronic microvascular changes w/o evidence of an acute transcortical infarction or hemorrhage  Cardiac Enzymes  CT Chest ordered  Palliative to discuss GOC with family as patient

## 2025-05-01 NOTE — CONSULT NOTE ADULT - ASSESSMENT
The patient has advanced colon carcinoma diagnosed in 2016 to be stage 3. He now has developed Stage 4 incurable colon carcinoma and he has received chemotherapy RT and pelvic exenteration (in 2021) There has been considerable deterioration his health in the past 2 months with urinary bleeding from a uterostomy and confusion he was placed on home hospice care 22 April 2025 by physician [facility] I have recommended continuation of home hospice. Delano wants to go home and this may be accomplished if supportive care may be offered.  he has a normal platelet count but elevation in both PT and PTT. You may consider infusion of FFP but this may only temporarily benefit.   Family will decide about any benefit if any about palliation with 2 units of FFP. Please infuse 2 units of FFP to help the patient and to help the family in their management of the patient who is at ernd stage of life given the advanced colon carcinoma

## 2025-05-01 NOTE — CONSULT NOTE ADULT - CONSULT REQUESTED DATE/TIME
01-May-2025 13:40
01-May-2025
28-Apr-2025 18:11
28-Apr-2025 20:52
01-May-2025 10:19
28-Apr-2025 13:16

## 2025-05-01 NOTE — CONSULT NOTE ADULT - CONSULT REASON
pseudomonas bacteremia
afib
hematuria from urostomy
pain management
stroke code
patient currently has been referred to hospice
Home.  No OT needs at this time.

## 2025-05-01 NOTE — PROGRESS NOTE ADULT - ASSESSMENT
60 Y/O male with PMHx colorectal CA s/p colostomy creation, urethral stricture s/p urostomy bag and b/l retrograde nephrostomy, severe AS/bicuspid AV, TAA, HTN, HLD, pAF, sacral ulcer s/p wound bag presented with hematuria in urostomy bag.  Remains afebrile with leukocytosis. BCx reveal pseudomonas. UCx reveals pseudomonas.    CTA from 4/30 reveals no active hemorrhage. No acute urologic intervention needed at this time. Recommend hematology consult.       PLAN:     - Recommend hematology consult   - Trend H/H, transfuse PRN  - Maintain hydration  - Continue antibiotics per ID; F/U final UCx   - Continue care per primary team; Discussed with Dr. Sutherland  - Discussed with Dr. Little

## 2025-05-01 NOTE — RAPID RESPONSE TEAM SUMMARY - NSSITUATIONBACKGROUNDRRT_GEN_ALL_CORE
59M w/ PMHx of colorectal CA s/p colostomy on hospice care, urethral stricture s/p urostomy bag, severe AS/Bicuspid AV, TAA, HTN, HLD, pAfib, sacral ulcer s/p wound bag and right hip fracture who presents to the ED with bleeding from urostomy bag around 1830. History is obtained from daughter at bedside. Per daughter, there has been increased bleeding and clots in the urostomy bsg since yesterday. Patient has has history of multiple antibiotic resistance UTI. Patient currently denies any acute symptoms or concerns. Denies headache, dizziness, chest pain, palpitations, SOB, abdominal pain, joint pain, diarrhea/constipation, or urinary symptoms.   59M w/ PMHx of colorectal CA s/p colostomy on hospice care, urethral stricture s/p urostomy bag, severe AS/Bicuspid AV, TAA, HTN, HLD, pAfib, sacral ulcer s/p wound bag and right hip fracture who presents to the ED with bleeding from urostomy bag around 1830. History is obtained from daughter at bedside. Per daughter, there has been increased bleeding and clots in the urostomy bsg since yesterday. Patient has has history of multiple antibiotic resistance UTI. Patient currently denies any acute symptoms or concerns. Denies headache, dizziness, chest pain, palpitations, SOB, abdominal pain, joint pain, diarrhea/constipation, or urinary symptoms.    Patient has advanced colon carcinoma diagnosed in 2016 to be stage 3. He now has developed Stage 4 colon carcinoma and he has received chemotherapy RT and pelvic exenteration (in 2021). Patient was placed on home hospice care by [facility]

## 2025-05-01 NOTE — PROGRESS NOTE ADULT - SUBJECTIVE AND OBJECTIVE BOX
follow up on:  complex medical decision making related to goals of care    OVERNIGHT EVENTS: Stroke code called this AM  for blurry vision, pt also with chest pain and complaints of "not feeling right" NIHSS 10, Review of systems:     Pain:  [ ] yes [ x] no  pain well managed on present PCA dosing     Location -                    Aggravating factors -  Quality -  Radiation -  Timing-  Severity (0-10 scale):  Minimal acceptable level (0-10 scale):     Dyspnea:      denies                        Anxiety:         denies                      Depression:   denies  Fatigue:     present                       Nausea:  denies                          Loss of appetite:  present        Constipation:     denies             Diarrhea:     denies    All other systems reviewed and negative    MEDICATIONS  (STANDING):  aMIOdarone    Tablet 100 milliGRAM(s) Oral daily  cefepime   IVPB      cefepime   IVPB 2000 milliGRAM(s) IV Intermittent every 8 hours  chlorhexidine 2% Cloths 1 Application(s) Topical daily  dexAMETHasone  Injectable 4 milliGRAM(s) IV Push two times a day  famotidine    Tablet 20 milliGRAM(s) Oral daily  midodrine. 5 milliGRAM(s) Oral three times a day  morphine PCA (5 mG/mL) 30 milliLiter(s) PCA Continuous PCA Continuous  OLANZapine Disintegrating Tablet 5 milliGRAM(s) Oral at bedtime    MEDICATIONS  (PRN):  acetaminophen     Tablet .. 650 milliGRAM(s) Oral every 6 hours PRN Temp greater or equal to 38C (100.4F), Mild Pain (1 - 3)  melatonin 3 milliGRAM(s) Oral at bedtime PRN Insomnia  morphine  - Injectable 2 milliGRAM(s) IV Push every 1 hour PRN Breakthrough pain  senna Syrup 10 milliLiter(s) Oral at bedtime PRN Constipation  simethicone 80 milliGRAM(s) Chew daily PRN Indigestion  sodium chloride 0.9% lock flush 10 milliLiter(s) IV Push every 1 hour PRN Pre/post blood products, medications, blood draw, and to maintain line patency      PHYSICAL EXAM:  Vital Signs Last 24 Hrs  T(C): 36.3 (01 May 2025 13:16), Max: 36.9 (01 May 2025 12:29)  T(F): 97.3 (01 May 2025 13:16), Max: 98.5 (01 May 2025 12:29)  HR: 127 (01 May 2025 13:16) (75 - 130)  BP: 104/78 (01 May 2025 13:16) (94/66 - 111/83)  BP(mean): --  RR: 20 (01 May 2025 13:16) (18 - 20)  SpO2: 100% (01 May 2025 13:16) (97% - 100%)    Parameters below as of 01 May 2025 13:16  Patient On (Oxygen Delivery Method): nasal cannula  O2 Flow (L/min): 2        Palliative Performance Scale/Karnofsky Score: 30%  ECOG Performance: 4    General: chronically ill appearing male in bed with c/o right hip and leg pain in bed c/o moderate back pain  HEENT: A/T, N/C anicteric, mm dry  Neck: supple no JVD   Cardiovascular: l S1, S2,tachycardic  no murmur, + 4 edema to BLE/BUE with weeping,   full body anasarca   Respiratory: CTAB, no wheezes, no rales, no rhonchi  GI:  distended, + colostomy + stooling , + venting PEG  :  + urostomy,  hematuria  Musculoskeletal: weak, moves her b/l arms without difficulty  Neurologic: awake and alert and oriented x3, non focal   Skin: Glans Penis, Stage IV Pressure Injury ; Scrotum, Stage II Pressure Injury, Left Ischium, Stage II Pressure Injury; Sacral Stage III Pressure Injury  Lymph: no lymphadenopathy   Psychiatric: calm and appropriate during exam   Oral intake: poor    LABS:                          8.2    12.25 )-----------( 255      ( 01 May 2025 06:18 )             25.7     05-01    135  |  105  |  30[H]  ----------------------------<  110[H]  3.5   |  21[L]  |  0.92    Ca    7.1[L]      01 May 2025 06:18    TPro  4.8[L]  /  Alb  1.3[L]  /  TBili  0.6  /  DBili  x   /  AST  13[L]  /  ALT  8[L]  /  AlkPhos  79  05-01    Urinalysis Basic - ( 01 May 2025 06:18 )    Color: x / Appearance: x / SG: x / pH: x  Gluc: 110 mg/dL / Ketone: x  / Bili: x / Urobili: x   Blood: x / Protein: x / Nitrite: x   Leuk Esterase: x / RBC: x / WBC x   Sq Epi: x / Non Sq Epi: x / Bacteria: x        RADIOLOGY & ADDITIONAL STUDIES: follow up on:  complex medical decision making related to goals of care    OVERNIGHT EVENTS: Stroke code called this AM  for blurry vision, pt also with chest pain and complaints of "not feeling right" NIHSS 10,    Review of systems:     Pain:  [ ] yes [ x] no  pain well managed on present PCA dosing     Location -                    Aggravating factors -  Quality -  Radiation -  Timing-  Severity (0-10 scale):  Minimal acceptable level (0-10 scale):     Dyspnea:      denies                        Anxiety:         denies                      Depression:   denies  Fatigue:     present                       Nausea:  denies                          Loss of appetite:  present        Constipation:     denies             Diarrhea:     denies    All other systems reviewed and negative    MEDICATIONS  (STANDING):  aMIOdarone    Tablet 100 milliGRAM(s) Oral daily  cefepime   IVPB      cefepime   IVPB 2000 milliGRAM(s) IV Intermittent every 8 hours  chlorhexidine 2% Cloths 1 Application(s) Topical daily  dexAMETHasone  Injectable 4 milliGRAM(s) IV Push two times a day  famotidine    Tablet 20 milliGRAM(s) Oral daily  midodrine. 5 milliGRAM(s) Oral three times a day  morphine PCA (5 mG/mL) 30 milliLiter(s) PCA Continuous PCA Continuous  OLANZapine Disintegrating Tablet 5 milliGRAM(s) Oral at bedtime    MEDICATIONS  (PRN):  acetaminophen     Tablet .. 650 milliGRAM(s) Oral every 6 hours PRN Temp greater or equal to 38C (100.4F), Mild Pain (1 - 3)  melatonin 3 milliGRAM(s) Oral at bedtime PRN Insomnia  morphine  - Injectable 2 milliGRAM(s) IV Push every 1 hour PRN Breakthrough pain  senna Syrup 10 milliLiter(s) Oral at bedtime PRN Constipation  simethicone 80 milliGRAM(s) Chew daily PRN Indigestion  sodium chloride 0.9% lock flush 10 milliLiter(s) IV Push every 1 hour PRN Pre/post blood products, medications, blood draw, and to maintain line patency      PHYSICAL EXAM:  Vital Signs Last 24 Hrs  T(C): 36.3 (01 May 2025 13:16), Max: 36.9 (01 May 2025 12:29)  T(F): 97.3 (01 May 2025 13:16), Max: 98.5 (01 May 2025 12:29)  HR: 127 (01 May 2025 13:16) (75 - 130)  BP: 104/78 (01 May 2025 13:16) (94/66 - 111/83)  BP(mean): --  RR: 20 (01 May 2025 13:16) (18 - 20)  SpO2: 100% (01 May 2025 13:16) (97% - 100%)    Parameters below as of 01 May 2025 13:16  Patient On (Oxygen Delivery Method): nasal cannula  O2 Flow (L/min): 2        Palliative Performance Scale/Karnofsky Score: 30%  ECOG Performance: 4    General: chronically ill appearing male in bed with c/o right hip and leg pain in bed c/o moderate back pain  HEENT: A/T, N/C anicteric, mm dry  Neck: supple no JVD   Cardiovascular: l S1, S2,tachycardic  no murmur, + 4 edema to BLE/BUE with weeping,   full body anasarca   Respiratory: CTAB, no wheezes, no rales, no rhonchi  GI:  distended, + colostomy + stooling , + venting PEG  :  + urostomy,  hematuria  Musculoskeletal: weak, moves her b/l arms without difficulty  Neurologic: awake and alert and oriented x3, non focal   Skin: Glans Penis, Stage IV Pressure Injury ; Scrotum, Stage II Pressure Injury, Left Ischium, Stage II Pressure Injury; Sacral Stage III Pressure Injury  Lymph: no lymphadenopathy   Psychiatric: calm and appropriate during exam   Oral intake: poor    LABS:                          8.2    12.25 )-----------( 255      ( 01 May 2025 06:18 )             25.7     05-01    135  |  105  |  30[H]  ----------------------------<  110[H]  3.5   |  21[L]  |  0.92    Ca    7.1[L]      01 May 2025 06:18    TPro  4.8[L]  /  Alb  1.3[L]  /  TBili  0.6  /  DBili  x   /  AST  13[L]  /  ALT  8[L]  /  AlkPhos  79  05-01    Urinalysis Basic - ( 01 May 2025 06:18 )    Color: x / Appearance: x / SG: x / pH: x  Gluc: 110 mg/dL / Ketone: x  / Bili: x / Urobili: x   Blood: x / Protein: x / Nitrite: x   Leuk Esterase: x / RBC: x / WBC x   Sq Epi: x / Non Sq Epi: x / Bacteria: x        RADIOLOGY & ADDITIONAL STUDIES:

## 2025-05-01 NOTE — CONSULT NOTE ADULT - ASSESSMENT
59M HTN, HLD, TAA. severe AS 2/2 bicuspid aortic valve, pAF, metastatic colorectal ca s/p colostomy, urethral stricture s/p urostomy, sacral wound who presented with bleeding from urostomy bag, course c/b afib with RVR.    -increase amiodarone to 200mg daily (previous dose)  -if BP can tolerate, start metoprolol tartrate 25mg BID, hold for SBP<100 or HR < 55    if acutely symptomatic with tachycardia, would presume A-fib with RVR and can give IV metoprolol as needed if BP can tolerate, otherwise consider 150mg IVPB amiodarone. would not check ECG at the time as this is not consistent with goals of care.

## 2025-05-01 NOTE — PROGRESS NOTE ADULT - SUBJECTIVE AND OBJECTIVE BOX
Patient seen and examined bedside with Dr. Lowe resting comfortably with daughter Rowan  No complaints offered.   Voiding without difficulty.  Denies hematuria and dysuria.  Denies N/V, chest pain, dyspnea, cough.    T(F): 97.3 (05-01-25 @ 13:16), Max: 98.5 (05-01-25 @ 12:29)  HR: 107 (05-01-25 @ 15:40) (75 - 130)  BP: 103/74 (05-01-25 @ 15:40) (94/66 - 111/83)  RR: 20 (05-01-25 @ 13:16) (18 - 20)  SpO2: 100% (05-01-25 @ 13:16) (88% - 100%)  Wt(kg): --  CAPILLARY BLOOD GLUCOSE      POCT Blood Glucose.: 122 mg/dL (01 May 2025 10:05)      PHYSICAL EXAM:  General: NAD, alert and awake  HEENT: NCAT, EOMI, conjunctiva clear  Chest: Nonlabored respirations, good inspiratory effort  Abdomen: Soft, NTND.   Extremities: No pedal edema or calf tenderness noted   : No CVA or SP tenderness.     LABS:                        8.2    12.25 )-----------( 255      ( 01 May 2025 06:18 )             25.7   05-01    135  |  105  |  30[H]  ----------------------------<  110[H]  3.5   |  21[L]  |  0.92    Ca    7.1[L]      01 May 2025 06:18    TPro  4.8[L]  /  Alb  1.3[L]  /  TBili  0.6  /  DBili  x   /  AST  13[L]  /  ALT  8[L]  /  AlkPhos  79  05-01    I&O's Detail    30 Apr 2025 07:01  -  01 May 2025 07:00  --------------------------------------------------------  IN:  Total IN: 0 mL    OUT:    Colostomy (mL): 200 mL    Other (mL): 200 mL    Stool (mL): 200 mL    Urostomy (mL): 1800 mL  Total OUT: 2400 mL    Total NET: -2400 mL      01 May 2025 07:01  -  01 May 2025 17:34  --------------------------------------------------------  IN:  Total IN: 0 mL    OUT:    Other (mL): 100 mL    Stool (mL): 100 mL    Urostomy (mL): 600 mL  Total OUT: 800 mL    Total NET: -800 mL          RADIOLOGY & ADDITIONAL STUDIES:       Patient seen and examined bedside with Dr. Little resting comfortably with daughter Rowan.   No complaints offered.   Urostomy bag with blood.  Denies N/V, chest pain, dyspnea, cough.    T(F): 97.3 (05-01-25 @ 13:16), Max: 98.5 (05-01-25 @ 12:29)  HR: 107 (05-01-25 @ 15:40) (75 - 130)  BP: 103/74 (05-01-25 @ 15:40) (94/66 - 111/83)  RR: 20 (05-01-25 @ 13:16) (18 - 20)  SpO2: 100% (05-01-25 @ 13:16) (88% - 100%)  Wt(kg): --  CAPILLARY BLOOD GLUCOSE      POCT Blood Glucose.: 122 mg/dL (01 May 2025 10:05)      PHYSICAL EXAM:  General: NAD, alert and awake  HEENT: NCAT, EOMI, conjunctiva clear  Chest: Nonlabored respirations, good inspiratory effort  Abdomen: Soft, NTND; ostomy pink   Extremities: No pedal edema or calf tenderness noted   : Edematous phallus with serous weeping from glans with dehiscence at glans; urostomy with merlot hematuria within gravity pouch        LABS:                        8.2    12.25 )-----------( 255      ( 01 May 2025 06:18 )             25.7   05-01    135  |  105  |  30[H]  ----------------------------<  110[H]  3.5   |  21[L]  |  0.92    Ca    7.1[L]      01 May 2025 06:18    TPro  4.8[L]  /  Alb  1.3[L]  /  TBili  0.6  /  DBili  x   /  AST  13[L]  /  ALT  8[L]  /  AlkPhos  79  05-01    I&O's Detail    30 Apr 2025 07:01  -  01 May 2025 07:00  --------------------------------------------------------  IN:  Total IN: 0 mL    OUT:    Colostomy (mL): 200 mL    Other (mL): 200 mL    Stool (mL): 200 mL    Urostomy (mL): 1800 mL  Total OUT: 2400 mL    Total NET: -2400 mL      01 May 2025 07:01  -  01 May 2025 17:34  --------------------------------------------------------  IN:  Total IN: 0 mL    OUT:    Other (mL): 100 mL    Stool (mL): 100 mL    Urostomy (mL): 600 mL  Total OUT: 800 mL    Total NET: -800 mL

## 2025-05-01 NOTE — PROGRESS NOTE ADULT - SUBJECTIVE AND OBJECTIVE BOX
Patient is a 59y old  Male who presents with a chief complaint of Hematuria (01 May 2025 14:11)    INTERVAL HPI/OVERNIGHT EVENTS: Patient with RRT today morning for AMS. CODE STROKE called in the morning.     MEDICATIONS  (STANDING):  aMIOdarone    Tablet 100 milliGRAM(s) Oral daily  carvedilol 3.125 milliGRAM(s) Oral every 12 hours  cefepime   IVPB      cefepime   IVPB 2000 milliGRAM(s) IV Intermittent every 8 hours  chlorhexidine 2% Cloths 1 Application(s) Topical daily  dexAMETHasone  Injectable 4 milliGRAM(s) IV Push two times a day  famotidine    Tablet 20 milliGRAM(s) Oral daily  midodrine. 5 milliGRAM(s) Oral three times a day  morphine PCA (5 mG/mL) 30 milliLiter(s) PCA Continuous PCA Continuous  OLANZapine Disintegrating Tablet 5 milliGRAM(s) Oral at bedtime    MEDICATIONS  (PRN):  acetaminophen     Tablet .. 650 milliGRAM(s) Oral every 6 hours PRN Temp greater or equal to 38C (100.4F), Mild Pain (1 - 3)  melatonin 3 milliGRAM(s) Oral at bedtime PRN Insomnia  morphine  - Injectable 2 milliGRAM(s) IV Push every 1 hour PRN Breakthrough pain  senna Syrup 10 milliLiter(s) Oral at bedtime PRN Constipation  simethicone 80 milliGRAM(s) Chew daily PRN Indigestion  sodium chloride 0.9% lock flush 10 milliLiter(s) IV Push every 1 hour PRN Pre/post blood products, medications, blood draw, and to maintain line patency      Allergies    No Known Allergies    Intolerances        REVIEW OF SYSTEMS: all negative with exception of above    Vital Signs Last 24 Hrs  T(C): 36.3 (01 May 2025 13:16), Max: 36.9 (01 May 2025 12:29)  T(F): 97.3 (01 May 2025 13:16), Max: 98.5 (01 May 2025 12:29)  HR: 107 (01 May 2025 15:40) (75 - 130)  BP: 103/74 (01 May 2025 15:40) (94/66 - 111/83)  BP(mean): --  RR: 20 (01 May 2025 13:16) (18 - 20)  SpO2: 100% (01 May 2025 13:16) (88% - 100%)    Parameters below as of 01 May 2025 13:16  Patient On (Oxygen Delivery Method): nasal cannula  O2 Flow (L/min): 2      PHYSICAL EXAM:  GENERAL: NAD, well-groomed  NERVOUS SYSTEM:  Alert & Oriented X3, Good concentration; Motor Strength 5/5 B/L upper and lower extremities; DTRs 2+ intact and symmetric  CHEST/LUNG: Clear to percussion bilaterally; No rales, rhonchi, wheezing, or rubs  HEART: Regular rate and rhythm; No murmurs, rubs, or gallops  ABDOMEN: Soft, Nontender, Nondistended; Bowel sounds present  EXTREMITIES:  2+ Peripheral Pulses, No clubbing, cyanosis, or edema    LABS:                        8.2    12.25 )-----------( 255      ( 01 May 2025 06:18 )             25.7     05-01    135  |  105  |  30[H]  ----------------------------<  110[H]  3.5   |  21[L]  |  0.92    Ca    7.1[L]      01 May 2025 06:18    TPro  4.8[L]  /  Alb  1.3[L]  /  TBili  0.6  /  DBili  x   /  AST  13[L]  /  ALT  8[L]  /  AlkPhos  79  05-01      Urinalysis Basic - ( 01 May 2025 06:18 )    Color: x / Appearance: x / SG: x / pH: x  Gluc: 110 mg/dL / Ketone: x  / Bili: x / Urobili: x   Blood: x / Protein: x / Nitrite: x   Leuk Esterase: x / RBC: x / WBC x   Sq Epi: x / Non Sq Epi: x / Bacteria: x      CAPILLARY BLOOD GLUCOSE      POCT Blood Glucose.: 122 mg/dL (01 May 2025 10:05)      RADIOLOGY & ADDITIONAL TESTS:    Imaging Personally Reviewed:  [ ] YES  [ ] NO  < from: CT Head No Cont (05.01.25 @ 10:48) >    IMPRESSION:  Mild chronic microvascular changes without evidence of an   acute transcortical infarction or hemorrhage.    --- End of Report ---      KEVIN FAITH MD  This document has been electronically signed. May  1 2025 11:05AM    < end of copied text >        Consultant(s) Notes Reviewed:  [ ] YES  [ ] NO    Care Discussed with Consultants/Other Providers [ ] YES  [ ] NO

## 2025-05-01 NOTE — CONSULT NOTE ADULT - SUBJECTIVE AND OBJECTIVE BOX
Cardiology Initial Consult    [facility] - Cardiology at [place]  [address] [place] [address], [place] [address]  Office: [phone]  Fax: [phone]    CHIEF COMPLAINT:      HISTORY OF PRESENT ILLNESS:  59M HTN, HLD, TAA. severe AS 2/2 bicuspid aortic valve, pAF, metastatic colorectal ca s/p colostomy, urethral stricture s/p urostomy, sacral wound who presented with bleeding from urostomy bag.    Found to have psuedomonas UTI/bacteremia    Episodes of afib with RVR with blurry vision and chest pain          Allergies    No Known Allergies    Intolerances    	    MEDICATIONS:  aMIOdarone    Tablet 100 milliGRAM(s) Oral daily  midodrine. 5 milliGRAM(s) Oral three times a day    cefepime   IVPB      cefepime   IVPB 2000 milliGRAM(s) IV Intermittent every 8 hours      acetaminophen     Tablet .. 650 milliGRAM(s) Oral every 6 hours PRN  melatonin 3 milliGRAM(s) Oral at bedtime PRN  morphine  - Injectable 2 milliGRAM(s) IV Push every 1 hour PRN  morphine PCA (5 mG/mL) 30 milliLiter(s) PCA Continuous PCA Continuous  OLANZapine Disintegrating Tablet 5 milliGRAM(s) Oral at bedtime    famotidine    Tablet 20 milliGRAM(s) Oral daily  senna Syrup 10 milliLiter(s) Oral at bedtime PRN  simethicone 80 milliGRAM(s) Chew daily PRN    dexAMETHasone  Injectable 4 milliGRAM(s) IV Push two times a day    chlorhexidine 2% Cloths 1 Application(s) Topical daily  sodium chloride 0.9% lock flush 10 milliLiter(s) IV Push every 1 hour PRN      PAST MEDICAL & SURGICAL HISTORY:  Rectal cancer      Essential hypertension      HLD (hyperlipidemia)      Chronic atrial fibrillation      H/O aortic valve stenosis      Thoracic aortic aneurysm (TAA)      Sacral ulcer      History of rectal surgery      S/P colostomy      History of urostomy          FAMILY HISTORY:      SOCIAL HISTORY:    [ ] Non-smoker  [ ] Smoker  [ ] Alcohol    Review of Systems:  Constitutional: [ ] Fever [ ] Chills [ ] Fatigue [ ] Weight change   HEENT: [ ] Blurred vision [ ] Eye pain [ ] Headache [ ] Runny nose [ ] Sore throat   Respiratory: [ ] Cough [ ] Wheezing [ ] Shortness of breath  Cardiovascular: [ ] Chest Pain [ ] Palpitations [ ] BANKS [ ] PND [ ] Orthopnea  Gastrointestinal: [ ] Abdominal Pain [ ] Diarrhea [ ] Constipation [ ] Hemorrhoids [ ] Nausea [ ] Vomiting  Genitourinary: [ ] Nocturia [ ] Dysuria [ ] Incontinence  Extremities: [ ] Swelling [ ] Joint Pain  Neurologic: [ ] Focal deficit [ ] Paresthesias [ ] Syncope  Skin: [ ] Rash [ ] Ecchymoses [ ] Wounds [ ] Lesions  Psychiatry: [ ] Depression [ ] Suicidal/Homicidal ideation [ ] Anxiety [ ] Sleep disturbances  [ ] 10 point review of systems is otherwise negative except as mentioned above            [ ]Unable to obtain    PHYSICAL EXAM:  T(C): 36.3 (05-01-25 @ 13:16), Max: 36.9 (05-01-25 @ 12:29)  HR: 127 (05-01-25 @ 13:16) (75 - 130)  BP: 104/78 (05-01-25 @ 13:16) (94/66 - 111/83)  RR: 20 (05-01-25 @ 13:16) (18 - 20)  SpO2: 100% (05-01-25 @ 13:16) (97% - 100%)  Wt(kg): --  I&O's Summary    30 Apr 2025 07:01  -  01 May 2025 07:00  --------------------------------------------------------  IN: 0 mL / OUT: 2400 mL / NET: -2400 mL    01 May 2025 07:01  -  01 May 2025 13:40  --------------------------------------------------------  IN: 0 mL / OUT: 800 mL / NET: -800 mL        Appearance: No acute distress  HEENT:   Normal oral mucosa, PERRL  Cardiovascular: Normal S1 S2, no elevated JVP, no murmurs, no edema  Respiratory: Lungs clear to auscultation	, good air movement  Psychiatry: A & O x 3, Mood & affect appropriate  Gastrointestinal:  soft nt nd normoactive bs	  Skin: No rashes, no ecchymoses, no cyanosis	  Neurologic: grossly non-focal  Extremities: Normal range of motion, no clubbing, cyanosis or edema  Vascular: Peripheral pulses palpable bilaterally    LABS:	 	  CBC Full  -  ( 01 May 2025 06:18 )  WBC Count : 12.25 K/uL  Hemoglobin : 8.2 g/dL  Hematocrit : 25.7 %  Platelet Count - Automated : 255 K/uL  Mean Cell Volume : 90.5 fl  Mean Cell Hemoglobin : 28.9 pg  Mean Cell Hemoglobin Concentration : 31.9 g/dL  Auto Neutrophil # : x  Auto Lymphocyte # : x  Auto Monocyte # : x  Auto Eosinophil # : x  Auto Basophil # : x  Auto Neutrophil % : x  Auto Lymphocyte % : x  Auto Monocyte % : x  Auto Eosinophil % : x  Auto Basophil % : x    05-01    135  |  105  |  30[H]  ----------------------------<  110[H]  3.5   |  21[L]  |  0.92    Ca    7.1[L]      01 May 2025 06:18    TPro  4.8[L]  /  Alb  1.3[L]  /  TBili  0.6  /  DBili  x   /  AST  13[L]  /  ALT  8[L]  /  AlkPhos  79  05-01      proBNP:   Lipid Profile:   HgA1c:   TSH:     CARDIAC MARKERS:            Troponin I, High Sensitivity Result: 9.7 ng/L (05-01-25 @ 06:18)      TELEMETRY: 	    ECG:  	  RADIOLOGY:  OTHER: 	    PREVIOUS DIAGNOSTIC TESTING:    [ ] Echocardiogram:   [ ] Catheterization:  [ ] Stress Test:  	 Cardiology Initial Consult    [facility] - Cardiology at [place]  [address] [place] [address], [place] [address]  Office: [phone]  Fax: [phone]    CHIEF COMPLAINT:  afib RVR    HISTORY OF PRESENT ILLNESS:  59M HTN, HLD, TAA. severe AS 2/2 bicuspid aortic valve, pAF, metastatic colorectal ca s/p colostomy, urethral stricture s/p urostomy, sacral wound who presented with bleeding from urostomy bag.    Found to have psuedomonas UTI/bacteremia    Episodes of afib with RVR with blurry vision and chest pain/heart-sinking feeling.          Allergies    No Known Allergies    Intolerances    	    MEDICATIONS:  aMIOdarone    Tablet 100 milliGRAM(s) Oral daily  midodrine. 5 milliGRAM(s) Oral three times a day  cefepime   IVPB      cefepime   IVPB 2000 milliGRAM(s) IV Intermittent every 8 hours  acetaminophen     Tablet .. 650 milliGRAM(s) Oral every 6 hours PRN  melatonin 3 milliGRAM(s) Oral at bedtime PRN  morphine  - Injectable 2 milliGRAM(s) IV Push every 1 hour PRN  morphine PCA (5 mG/mL) 30 milliLiter(s) PCA Continuous PCA Continuous  OLANZapine Disintegrating Tablet 5 milliGRAM(s) Oral at bedtime  famotidine    Tablet 20 milliGRAM(s) Oral daily  senna Syrup 10 milliLiter(s) Oral at bedtime PRN  simethicone 80 milliGRAM(s) Chew daily PRN  dexAMETHasone  Injectable 4 milliGRAM(s) IV Push two times a day  chlorhexidine 2% Cloths 1 Application(s) Topical daily  sodium chloride 0.9% lock flush 10 milliLiter(s) IV Push every 1 hour PRN    PAST MEDICAL & SURGICAL HISTORY:  Rectal cancer  Essential hypertension  HLD (hyperlipidemia)  Chronic atrial fibrillation  H/O aortic valve stenosis  Thoracic aortic aneurysm (TAA)  Sacral ulcer  History of rectal surgery  S/P colostomy  History of urostomy    FAMILY HISTORY:    SOCIAL HISTORY:    [x ] Non-smoker  [ ] Smoker  [ ] Alcohol    Review of Systems:  Constitutional: [ ] Fever [ ] Chills [x ] Fatigue [ ] Weight change   HEENT: [ ] Blurred vision [ ] Eye pain [ -] Headache [ ] Runny nose [ ] Sore throat   Respiratory: [ ] Cough [ ] Wheezing [ -] Shortness of breath  Cardiovascular: [x ] Chest Pain [x ] Palpitations [ ] BANKS [ ] PND [ ] Orthopnea  Gastrointestinal: [- ] Abdominal Pain [ ] Diarrhea [ ] Constipation [ ] Hemorrhoids [ ] Nausea [ ] Vomiting  Genitourinary: [ ] Nocturia [ ] Dysuria [- ] Incontinence  Extremities: [ x] Swelling [ ] Joint Pain  Neurologic: [ ] Focal deficit [ ] Paresthesias [- ] Syncope  Skin: [- ] Rash [ ] Ecchymoses [ ] Wounds [ ] Lesions  Psychiatry: [ ] Depression [ -] Suicidal/Homicidal ideation [ ] Anxiety [ ] Sleep disturbances  [ x] 10 point review of systems is otherwise negative except as mentioned above            [ ]Unable to obtain    PHYSICAL EXAM:  T(C): 36.3 (05-01-25 @ 13:16), Max: 36.9 (05-01-25 @ 12:29)  HR: 127 (05-01-25 @ 13:16) (75 - 130)  BP: 104/78 (05-01-25 @ 13:16) (94/66 - 111/83)  RR: 20 (05-01-25 @ 13:16) (18 - 20)  SpO2: 100% (05-01-25 @ 13:16) (97% - 100%)  Wt(kg): --  I&O's Summary    30 Apr 2025 07:01  -  01 May 2025 07:00  --------------------------------------------------------  IN: 0 mL / OUT: 2400 mL / NET: -2400 mL    01 May 2025 07:01  -  01 May 2025 13:40  --------------------------------------------------------  IN: 0 mL / OUT: 800 mL / NET: -800 mL        Appearance: No acute distress  HEENT:  mmm  Cardiovascular: Normal S1 S2, no elevated JVP, 2/6 murmur RUSB, + LE edema  Respiratory: Lungs clear to auscultation	, good air movement  Psychiatry:  Mood & affect appropriate  Gastrointestinal:  soft  Skin: no cyanosis	    LABS:	 	  CBC Full  -  ( 01 May 2025 06:18 )  WBC Count : 12.25 K/uL  Hemoglobin : 8.2 g/dL  Hematocrit : 25.7 %  Platelet Count - Automated : 255 K/uL    05-01  135  |  105  |  30[H]  ----------------------------<  110[H]  3.5   |  21[L]  |  0.92    Ca    7.1[L]      01 May 2025 06:18    TPro  4.8[L]  /  Alb  1.3[L]  /  TBili  0.6  /  DBili  x   /  AST  13[L]  /  ALT  8[L]  /  AlkPhos  79  05-01      proBNP:   Lipid Profile:   HgA1c:   TSH:     CARDIAC MARKERS:  Troponin I, High Sensitivity Result: 9.7 ng/L (05-01-25 @ 06:18)      ECG:  	ST, anterior infarct  RADIOLOGY:  OTHER: 	    PREVIOUS DIAGNOSTIC TESTING:    [x] Echocardiogram: < from: TTE W or WO Ultrasound Enhancing Agent (04.09.24 @ 13:59) >   1. Left ventricular systolic function is normal with an ejection fraction of 71 % by Cabral's method of disks.   2. Theleft atrium is mildly dilated.   3. 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 Gradients consistent with moderate mitral stenosis, however may be underestimated, as in some views, mitral stenosis appears severe.   4. Critical aortic stenosis seen with an ARIA of 0.49 cm2, based on continuity equation.   5. Suspect moderate aortic regurgitation.   6. Normal right ventricular cavity size, with normal wall thickness, and normalsystolic function. Tricuspid annular plane systolic excursion (TAPSE) is 2.8 cm (normal >=1.7 cm).   7. The inferior vena cava is normal in size (normal <2.1cm) with normal inspiratory collapse (normal >50%) consistent with normal right atrial pressure (~3, range 0-5mmHg).   8. No pericardial effusion seen.   9. Would consider SHAWN for further evauation of the mitral valve, if clinically indicated.    < end of copied text >    [ ] Catheterization:  [ ] Stress Test:

## 2025-05-01 NOTE — PROGRESS NOTE ADULT - ASSESSMENT
59M w/ PMHx of colorectal CA s/p colostomy on hospice care, urethral stricture s/p urostomy bag, severe AS/Bicuspid AV, TAA, HTN, HLD, pAfib, sacral ulcer s/p wound bag and right hip fracture who presents to the ED with bleeding from urostomy bag. Admitted to medicine for further management and monitoring. Detailed plan as below:     Painless hematuria. and acute blood loss anemia:  Sepsis POA  Possibly 2/2 UTI vs invasive malignancy   CT with no stone, no hydro, stent in place  -Urine Clx with GNR, and Blood cultures with pseudomonas  -On Zosyn, dose increased , one dose amikacin given per ID  - transfused 2 units for acute blood loss anemia Hb 6.0  - Hb improved   - Hematuria recurred  - CT noted: Extensive invasive cancer with severe anasarca.   - Discussed with daughter at length , will farhan fox repeat Ct with contrast for now  - Goal is to treat infection and pain control and dc back to home hospice  - CTA A/P ordered  - ID and urology on board  - C/w Cefepime  - will obtain records from [facility] urologist, Dr. John Keith  - Appreciate heme onc and palliative recs    Malignant Colorectal cancer:  colorectal CA s/p colostomy on hospice care  -Oncologist:: Dr Aneudy Contreras at [facility]   -Urologist: Dr Isacc Keith at [facility]  -On home Morphine pump  and fentanyl patch 200 mcg / 72 hours   - palliative on board for pain management   -c/w home simethicone, famotidine, olanzapine 5 mg ODT QHS      Hypotension:  -s/p midodrine 10 mg PO in ED , will continue for now to allow room for pain med  -Closely monitor BP.    HLD:  Continue with home atorvastatin 20 mg QHS.    Paroxysmal atrial fibrillation.   -Continue with home amiodarone 100 mg QD   -No anticoagulation given hematuria       Sacral ulcer.   -Monitor wound bag  -Wound care consultation placed.    VTE PPx: SCD  Nutrition: DASH/TLC Diet  Fluids: PRN  Electrolytes: Maintain K>4, Mag >2, Phos > 3  Access: PIV    Pt is DNR/DNI with NIV trial ( see detailed form)    Dispo: pending clinical improvement.  palliative consulted for assistance with pain management.    Discussed at length with family at bedside. prognosis poor.

## 2025-05-01 NOTE — CONSULT NOTE ADULT - SUBJECTIVE AND OBJECTIVE BOX
Neurology Consult    Reason for Consult: Patient is a 59y old  Male who presents with a chief complaint of Hematuria (30 Apr 2025 16:37)      HPI:  59M w/ PMHx of colorectal CA s/p colostomy on hospice care, urethral stricture s/p urostomy bag, severe AS/Bicuspid AV, TAA, HTN, HLD, pAfib, sacral ulcer s/p wound bag and right hip fracture who presents to the ED with bleeding from urostomy bag around 1830. History is obtained from daughter at bedside. Per daughter, there has been increased bleeding and clots in the urostomy bsg since yesterday. Patient has has history of multiple antibiotic resistance UTI. Patient currently denies any acute symptoms or concerns. Denies headache, dizziness, chest pain, palpitations, SOB, abdominal pain, joint pain, diarrhea/constipation, or urinary symptoms.   (26 Apr 2025 06:00)       PAST MEDICAL & SURGICAL HISTORY:  Rectal cancer      Essential hypertension      HLD (hyperlipidemia)      Chronic atrial fibrillation      H/O aortic valve stenosis      Thoracic aortic aneurysm (TAA)      Sacral ulcer      History of rectal surgery      S/P colostomy      History of urostomy          Allergies: Allergies    No Known Allergies    Intolerances        Social History: Denies toxic habits including tobacco, ETOH or illicit drugs.    Family History: FAMILY HISTORY:  . No family history of strokes    Medications: MEDICATIONS  (STANDING):  aMIOdarone    Tablet 100 milliGRAM(s) Oral daily  cefepime   IVPB      cefepime   IVPB 2000 milliGRAM(s) IV Intermittent every 8 hours  chlorhexidine 2% Cloths 1 Application(s) Topical daily  dexAMETHasone  Injectable 4 milliGRAM(s) IV Push two times a day  famotidine    Tablet 20 milliGRAM(s) Oral daily  midodrine. 5 milliGRAM(s) Oral three times a day  morphine PCA (5 mG/mL) 30 milliLiter(s) PCA Continuous PCA Continuous  OLANZapine Disintegrating Tablet 5 milliGRAM(s) Oral at bedtime    MEDICATIONS  (PRN):  acetaminophen     Tablet .. 650 milliGRAM(s) Oral every 6 hours PRN Temp greater or equal to 38C (100.4F), Mild Pain (1 - 3)  melatonin 3 milliGRAM(s) Oral at bedtime PRN Insomnia  morphine  - Injectable 2 milliGRAM(s) IV Push every 1 hour PRN Breakthrough pain  senna Syrup 10 milliLiter(s) Oral at bedtime PRN Constipation  simethicone 80 milliGRAM(s) Chew daily PRN Indigestion  sodium chloride 0.9% lock flush 10 milliLiter(s) IV Push every 1 hour PRN Pre/post blood products, medications, blood draw, and to maintain line patency      Review of Systems:  CONSTITUTIONAL:  No weight loss, fever, chills, weakness or fatigue.  HEENT:  Eyes:  No visual loss, blurred vision, double vision or yellow sclera. Ears, Nose, Throat:  No hearing loss, sneezing, congestion, runny nose or sore throat.  SKIN:  No rash or itching.  CARDIOVASCULAR:  No chest pain, chest pressure or chest discomfort. No palpitations or edema.  RESPIRATORY:  No shortness of breath, cough or sputum.  GASTROINTESTINAL:  No anorexia, nausea, vomiting or diarrhea. No abdominal pain or blood.  GENITOURINARY:  No burning on urination or incontinence   NEUROLOGICAL:  No headache, dizziness, syncope, paralysis, ataxia, numbness or tingling in the extremities. No change in bowel or bladder control. no limb weakness. no vision changes.   MUSCULOSKELETAL:  No muscle, back pain, joint pain or stiffness.  HEMATOLOGIC:  No anemia, bleeding or bruising.  LYMPHATICS:  No enlarged nodes. No history of splenectomy.  PSYCHIATRIC:  No history of depression or anxiety.  ENDOCRINOLOGIC:  No reports of sweating, cold or heat intolerance. No polyuria or polydipsia.      Vitals:  Vital Signs Last 24 Hrs  T(C): 36.3 (01 May 2025 04:55), Max: 36.4 (30 Apr 2025 11:19)  T(F): 97.3 (01 May 2025 04:55), Max: 97.6 (30 Apr 2025 11:19)  HR: 79 (01 May 2025 04:55) (75 - 91)  BP: 95/67 (01 May 2025 04:55) (90/65 - 97/67)  BP(mean): --  RR: 18 (01 May 2025 04:55) (18 - 18)  SpO2: 98% (01 May 2025 04:55) (98% - 100%)    Parameters below as of 01 May 2025 04:55  Patient On (Oxygen Delivery Method): room air        General Exam:   General Appearance: cachetic, lethargic  Head: Normocephalic, atraumatic and no dysmorphic features  Ear, Nose, and Throat: Moist mucous membranes  CVS: S1S2+  Resp: No SOB, no wheeze or rhonchi  GI: soft NT/ND  ExtremitiesL: + LE edema  Skin: No bruises or rashes     Neurological Exam:  Mental Status: Eyes closed, opens to verbal, oriented x 2, follows simple commands with prompting, psychomotor slowing, mild dysarthria, no aphasia.    Cranial Nerves: PERRL, EOMI, VFFC, sensation V1-V3 intact,  no obvious facial asymmetry  Motor: dec bulk throughout, moves uppers antigravity, lowers slight in plane of bed (bedbound baseline)  Sensation: Intact to light touch  Coordination: unable  Gait: deferred    Data/Labs/Imaging which I personally reviewed.     Labs:     CBC Full  -  ( 01 May 2025 06:18 )  WBC Count : 12.25 K/uL  RBC Count : 2.84 M/uL  Hemoglobin : 8.2 g/dL  Hematocrit : 25.7 %  Platelet Count - Automated : 255 K/uL  Mean Cell Volume : 90.5 fl  Mean Cell Hemoglobin : 28.9 pg  Mean Cell Hemoglobin Concentration : 31.9 g/dL  Auto Neutrophil # : x  Auto Lymphocyte # : x  Auto Monocyte # : x  Auto Eosinophil # : x  Auto Basophil # : x  Auto Neutrophil % : x  Auto Lymphocyte % : x  Auto Monocyte % : x  Auto Eosinophil % : x  Auto Basophil % : x    05-01    135  |  105  |  30[H]  ----------------------------<  110[H]  3.5   |  21[L]  |  0.92    Ca    7.1[L]      01 May 2025 06:18    TPro  4.8[L]  /  Alb  1.3[L]  /  TBili  0.6  /  DBili  x   /  AST  13[L]  /  ALT  8[L]  /  AlkPhos  79  05-01    LIVER FUNCTIONS - ( 01 May 2025 06:18 )  Alb: 1.3 g/dL / Pro: 4.8 gm/dL / ALK PHOS: 79 U/L / ALT: 8 U/L / AST: 13 U/L / GGT: x             Urinalysis Basic - ( 01 May 2025 06:18 )    Color: x / Appearance: x / SG: x / pH: x  Gluc: 110 mg/dL / Ketone: x  / Bili: x / Urobili: x   Blood: x / Protein: x / Nitrite: x   Leuk Esterase: x / RBC: x / WBC x   Sq Epi: x / Non Sq Epi: x / Bacteria: x

## 2025-05-02 LAB
ANION GAP SERPL CALC-SCNC: 9 MMOL/L — SIGNIFICANT CHANGE UP (ref 5–17)
APTT BLD: 22.9 SEC — LOW (ref 26.1–36.8)
BLD GP AB SCN SERPL QL: SIGNIFICANT CHANGE UP
BUN SERPL-MCNC: 36 MG/DL — HIGH (ref 7–23)
CALCIUM SERPL-MCNC: 7.1 MG/DL — LOW (ref 8.5–10.1)
CHLORIDE SERPL-SCNC: 104 MMOL/L — SIGNIFICANT CHANGE UP (ref 96–108)
CO2 SERPL-SCNC: 21 MMOL/L — LOW (ref 22–31)
CREAT SERPL-MCNC: 1.19 MG/DL — SIGNIFICANT CHANGE UP (ref 0.5–1.3)
CULTURE RESULTS: SIGNIFICANT CHANGE UP
EGFR: 71 ML/MIN/1.73M2 — SIGNIFICANT CHANGE UP
EGFR: 71 ML/MIN/1.73M2 — SIGNIFICANT CHANGE UP
GLUCOSE SERPL-MCNC: 157 MG/DL — HIGH (ref 70–99)
HCT VFR BLD CALC: 20.6 % — CRITICAL LOW (ref 39–50)
HGB BLD-MCNC: 6.9 G/DL — CRITICAL LOW (ref 13–17)
INR BLD: 1.14 RATIO — SIGNIFICANT CHANGE UP (ref 0.85–1.16)
MCHC RBC-ENTMCNC: 29.7 PG — SIGNIFICANT CHANGE UP (ref 27–34)
MCHC RBC-ENTMCNC: 33.5 G/DL — SIGNIFICANT CHANGE UP (ref 32–36)
MCV RBC AUTO: 88.8 FL — SIGNIFICANT CHANGE UP (ref 80–100)
NRBC BLD AUTO-RTO: 0 /100 WBCS — SIGNIFICANT CHANGE UP (ref 0–0)
PLATELET # BLD AUTO: 207 K/UL — SIGNIFICANT CHANGE UP (ref 150–400)
POTASSIUM SERPL-MCNC: 3.8 MMOL/L — SIGNIFICANT CHANGE UP (ref 3.5–5.3)
POTASSIUM SERPL-SCNC: 3.8 MMOL/L — SIGNIFICANT CHANGE UP (ref 3.5–5.3)
PROTHROM AB SERPL-ACNC: 13.2 SEC — SIGNIFICANT CHANGE UP (ref 9.9–13.4)
RBC # BLD: 2.32 M/UL — LOW (ref 4.2–5.8)
RBC # FLD: 19.9 % — HIGH (ref 10.3–14.5)
SODIUM SERPL-SCNC: 134 MMOL/L — LOW (ref 135–145)
SPECIMEN SOURCE: SIGNIFICANT CHANGE UP
WBC # BLD: 13.86 K/UL — HIGH (ref 3.8–10.5)
WBC # FLD AUTO: 13.86 K/UL — HIGH (ref 3.8–10.5)

## 2025-05-02 PROCEDURE — 99232 SBSQ HOSP IP/OBS MODERATE 35: CPT

## 2025-05-02 PROCEDURE — 99291 CRITICAL CARE FIRST HOUR: CPT | Mod: GW

## 2025-05-02 PROCEDURE — 99233 SBSQ HOSP IP/OBS HIGH 50: CPT

## 2025-05-02 RX ORDER — CIPROFLOXACIN HCL 250 MG
500 TABLET ORAL EVERY 12 HOURS
Refills: 0 | Status: COMPLETED | OUTPATIENT
Start: 2025-05-02 | End: 2025-05-05

## 2025-05-02 RX ORDER — DEXAMETHASONE 0.5 MG/1
6 TABLET ORAL
Refills: 0 | Status: DISCONTINUED | OUTPATIENT
Start: 2025-05-02 | End: 2025-05-06

## 2025-05-02 RX ADMIN — MIDODRINE HYDROCHLORIDE 5 MILLIGRAM(S): 5 TABLET ORAL at 16:52

## 2025-05-02 RX ADMIN — Medication 30 MILLILITER(S): at 07:24

## 2025-05-02 RX ADMIN — AMIODARONE HYDROCHLORIDE 200 MILLIGRAM(S): 50 INJECTION, SOLUTION INTRAVENOUS at 06:36

## 2025-05-02 RX ADMIN — Medication 30 MILLILITER(S): at 08:34

## 2025-05-02 RX ADMIN — Medication 30 MILLILITER(S): at 19:37

## 2025-05-02 RX ADMIN — Medication 30 MILLILITER(S): at 20:45

## 2025-05-02 RX ADMIN — DEXAMETHASONE 6 MILLIGRAM(S): 0.5 TABLET ORAL at 13:26

## 2025-05-02 RX ADMIN — Medication 30 MILLILITER(S): at 16:50

## 2025-05-02 RX ADMIN — Medication 1 APPLICATION(S): at 13:26

## 2025-05-02 RX ADMIN — Medication 20 MILLIGRAM(S): at 13:26

## 2025-05-02 RX ADMIN — MIDODRINE HYDROCHLORIDE 5 MILLIGRAM(S): 5 TABLET ORAL at 06:36

## 2025-05-02 RX ADMIN — Medication 80 MILLIGRAM(S): at 18:52

## 2025-05-02 RX ADMIN — Medication 30 MILLILITER(S): at 12:35

## 2025-05-02 RX ADMIN — DEXAMETHASONE 4 MILLIGRAM(S): 0.5 TABLET ORAL at 08:05

## 2025-05-02 RX ADMIN — Medication 500 MILLIGRAM(S): at 18:37

## 2025-05-02 RX ADMIN — CEFEPIME 100 MILLIGRAM(S): 2 INJECTION, POWDER, FOR SOLUTION INTRAVENOUS at 08:04

## 2025-05-02 RX ADMIN — CEFEPIME 100 MILLIGRAM(S): 2 INJECTION, POWDER, FOR SOLUTION INTRAVENOUS at 13:25

## 2025-05-02 RX ADMIN — MIDODRINE HYDROCHLORIDE 5 MILLIGRAM(S): 5 TABLET ORAL at 13:25

## 2025-05-02 RX ADMIN — OLANZAPINE 5 MILLIGRAM(S): 10 TABLET ORAL at 22:41

## 2025-05-02 NOTE — PROGRESS NOTE ADULT - ASSESSMENT
59M w/ PMHx of colorectal CA s/p colostomy,  venting PEG, s/p urostomy bag, severe AS/Bicuspid AV, TAA, HTN, HLD, pAfib, sacral ulcer s/p wound bag and B/L  hip fracture who presents to the ED with bleeding from urostomy bag. Pt was   admitted to [facility] 5 days  for home hospice  and was started on PCA Morphine for increased pain. Pt  presents to the ED with bleeding from urostomy bag.

## 2025-05-02 NOTE — PROGRESS NOTE ADULT - PROBLEM SELECTOR PLAN 5
MARIELA on file for DNR/DNI. Recently enrolled in [facility] hospice. Spoke w daughter at bedside yesterday, they are no longer pursuing transfer, understand he is most appropriate for hospice at this time, main goal is pain control and getting the patient back home to be with family for the time he has left. They would like to resume home hospice services once pain under better control and infection treated. Discussed inpatient hospice option however family not interested at this time. Support provided. SW and primary team updated. Palliative will follow.    f/u ID recs regarding length of antibiotics course
unclear source of bleeding,  following, s/p 3uprbc, monitor Hb. Ongoing transfusions likely to be more burdensome than beneficial given terminal condition and anasarca  Patient opted to have CTA done, no active bleeding noted, f/u  recs
MARIELA on file for DNR/DNI. Recently enrolled in [facility] hospice. Spoke w daughter at bedside today, they are no longer pursuing transfer, understand he is most appropriate for hospice at this time, main goal is pain control and getting the patient back home to be with family for the time he has left. They would like to resume home hospice services once pain under better control and infection treated. Discussed inpatient hospice option however family not interested at this time. Support provided. SW and primary team updated. Palliative will follow.
Ongoing support and discussion with  daughter this AM  at bedside Daughter was teary eyed and quite emotional. She states that she and her extended family had extensive discussion last night. Pt has made it clear that he wants to return home, no further "scans, poking or proding" Family is on route from [place], [place] and [place] and family is hoping they will make it to [place] in time to say their good byes. Family is on board to reinstate home hospice services. Patricia notified. Daughter is in agreement to forgo post H & H. She understands that further blood transfusions are only  a band aid and serve no long benefit in the long run. They state that their goal at this time is honor pt wishes to return to his home where he will die there. Reviewed hospice 24 hr call line, daughter has full understanding that she is to call with any concerns.     Coordinated care with Patricia Trinidad.    Discussed with Dr Sutherland

## 2025-05-02 NOTE — PROGRESS NOTE ADULT - ASSESSMENT
58 y/o M with gross hematuria in urostomy. CTA 4/30 without evidence of bleeding.    Plan:  Empty urostomy bag  Monitor urine color  No urological intervention planned at this time  Discussed pt with Dr. Little

## 2025-05-02 NOTE — PROGRESS NOTE ADULT - ASSESSMENT
59M w/ PMHx of colorectal CA s/p colostomy on hospice care, urethral stricture s/p urostomy bag, severe AS/Bicuspid AV, TAA, HTN, HLD, pAfib, sacral ulcer s/p wound bag and right hip fracture who presents to the ED with bleeding from urostomy bag. Admitted to medicine for further management and monitoring. Detailed plan as below:     Painless hematuria. and acute blood loss anemia:  Sepsis POA  Possibly 2/2 UTI vs invasive malignancy   CT with no stone, no hydro, stent in place  -Urine Clx with GNR, and Blood cultures with pseudomonas  -On Zosyn, dose increased , one dose amikacin given per ID  - transfused 2 units for acute blood loss anemia Hb 6.0  - Hb improved   - Hematuria recurred  - CT noted: Extensive invasive cancer with severe anasarca.   - Discussed with daughter at length , jose farhan fox repeat Ct with contrast for now  - Goal is to treat infection and pain control and dc back to home hospice  - CTA A/P reviewed  - H/H dropped below 7 and transfused 1u prbc. Will stop trending labs given plan for home w/ hospice. Family in agreement.   - ID and urology on board  - Switched Cefepime to Augmentin  - Appreciate heme onc and palliative recs  - Increased morphine gtt and dexamethasone    Malignant Colorectal cancer:  colorectal CA s/p colostomy on hospice care  -Oncologist:: Dr Aneudy Contreras at [facility]   -Urologist: Dr Isacc Keith at [facility]  -On home Morphine pump  and fentanyl patch 200 mcg / 72 hours   - palliative on board for pain management   -c/w home simethicone, famotidine, olanzapine 5 mg ODT QHS      Hypotension:  -s/p midodrine 10 mg PO in ED , will continue for now to allow room for pain med  -Closely monitor BP.    HLD:  Continue with home atorvastatin 20 mg QHS.    Paroxysmal atrial fibrillation.   -Continue with home amiodarone 100 mg QD   -No anticoagulation given hematuria       Sacral ulcer.   -Monitor wound bag  -Wound care consultation placed.    VTE PPx: SCD  Nutrition: DASH/TLC Diet  Fluids: PRN  Electrolytes: Maintain K>4, Mag >2, Phos > 3  Access: PIV    Pt is DNR/DNI with NIV trial ( see detailed form)    Dispo: pending clinical improvement.  palliative consulted for assistance with pain management.    Discussed at length with family at bedside. prognosis poor.

## 2025-05-02 NOTE — PROVIDER CONTACT NOTE (CRITICAL VALUE NOTIFICATION) - PERSON GIVING RESULT:
Naomi Tejada - josette
Flaquita Martell [facility]
Naomi Garber-  Marilin
Miesha Gutierrez

## 2025-05-02 NOTE — PROGRESS NOTE ADULT - SUBJECTIVE AND OBJECTIVE BOX
Patient is a 59y old  Male who presents with a chief complaint of Hematuria (02 May 2025 11:11)    INTERVAL HPI/OVERNIGHT EVENTS: No acute events overnight. HD stable.     MEDICATIONS  (STANDING):  aMIOdarone    Tablet 200 milliGRAM(s) Oral daily  carvedilol 3.125 milliGRAM(s) Oral every 12 hours  cefepime   IVPB      cefepime   IVPB 2000 milliGRAM(s) IV Intermittent every 8 hours  chlorhexidine 2% Cloths 1 Application(s) Topical daily  dexAMETHasone  Injectable 6 milliGRAM(s) IV Push <User Schedule>  famotidine    Tablet 20 milliGRAM(s) Oral daily  midodrine. 5 milliGRAM(s) Oral three times a day  morphine PCA (5 mG/mL) 30 milliLiter(s) PCA Continuous PCA Continuous  OLANZapine Disintegrating Tablet 5 milliGRAM(s) Oral at bedtime    MEDICATIONS  (PRN):  acetaminophen     Tablet .. 650 milliGRAM(s) Oral every 6 hours PRN Temp greater or equal to 38C (100.4F), Mild Pain (1 - 3)  melatonin 3 milliGRAM(s) Oral at bedtime PRN Insomnia  morphine  - Injectable 2 milliGRAM(s) IV Push every 1 hour PRN Breakthrough pain  senna Syrup 10 milliLiter(s) Oral at bedtime PRN Constipation  simethicone 80 milliGRAM(s) Chew daily PRN Indigestion  sodium chloride 0.9% lock flush 10 milliLiter(s) IV Push every 1 hour PRN Pre/post blood products, medications, blood draw, and to maintain line patency      Allergies    No Known Allergies    Intolerances        REVIEW OF SYSTEMS: all negative with exception of above    Vital Signs Last 24 Hrs  T(C): 36.3 (02 May 2025 11:35), Max: 36.8 (01 May 2025 17:38)  T(F): 97.4 (02 May 2025 11:35), Max: 98.2 (01 May 2025 17:38)  HR: 94 (02 May 2025 11:35) (58 - 107)  BP: 100/70 (02 May 2025 11:35) (80/62 - 113/73)  BP(mean): --  RR: 18 (02 May 2025 11:35) (16 - 19)  SpO2: 100% (02 May 2025 11:35) (99% - 100%)    Parameters below as of 02 May 2025 10:57  Patient On (Oxygen Delivery Method): room air    PHYSICAL EXAM:  GENERAL: NAD, well-groomed  NERVOUS SYSTEM:  Alert & Oriented X3, Good concentration; Motor Strength 5/5 B/L upper and lower extremities; DTRs 2+ intact and symmetric  CHEST/LUNG: Clear to percussion bilaterally; No rales, rhonchi, wheezing, or rubs  HEART: Regular rate and rhythm; No murmurs, rubs, or gallops  ABDOMEN: Soft, Nontender, Nondistended; Bowel sounds present  EXTREMITIES:  2+ Peripheral Pulses, No clubbing, cyanosis, or edema    LABS:                        6.9    13.86 )-----------( 207      ( 02 May 2025 01:00 )             20.6     05-02    134[L]  |  104  |  36[H]  ----------------------------<  157[H]  3.8   |  21[L]  |  1.19    Ca    7.1[L]      02 May 2025 01:00    TPro  4.8[L]  /  Alb  1.3[L]  /  TBili  0.6  /  DBili  x   /  AST  13[L]  /  ALT  8[L]  /  AlkPhos  79  05-01    PT/INR - ( 02 May 2025 01:00 )   PT: 13.2 sec;   INR: 1.14 ratio         PTT - ( 02 May 2025 01:00 )  PTT:22.9 sec  Urinalysis Basic - ( 02 May 2025 01:00 )    Color: x / Appearance: x / SG: x / pH: x  Gluc: 157 mg/dL / Ketone: x  / Bili: x / Urobili: x   Blood: x / Protein: x / Nitrite: x   Leuk Esterase: x / RBC: x / WBC x   Sq Epi: x / Non Sq Epi: x / Bacteria: x      CAPILLARY BLOOD GLUCOSE          RADIOLOGY & ADDITIONAL TESTS:    Imaging Personally Reviewed:  [ ] YES  [ ] NO    Consultant(s) Notes Reviewed:  [ ] YES  [ ] NO    Care Discussed with Consultants/Other Providers [ ] YES  [ ] NO

## 2025-05-02 NOTE — PROGRESS NOTE ADULT - SUBJECTIVE AND OBJECTIVE BOX
follow up on:  complex medical decision making related to goals of care    OVERNIGHT EVENTS:  Pt with low HGB overnight transfused 1 UPRBC's   pain worse this AM, using 1-2 bolus doses q 4 hrs    Review of systems:     Pain:  [x ] yes [ ] no worsening pain in right hip radiating down leg, grinding abdominal pain   QOL impact -   Location -                    Aggravating factors -  Quality -  Radiation -  Timing-  Severity (0-10 scale):  Minimal acceptable level (0-10 scale):     Dyspnea:      denies                        Anxiety:         denies                      Depression:   denies  Fatigue:     present                        Nausea:      denies                         Loss of appetite: present           Constipation:  denies             Diarrhea:     denies    All other systems reviewed and negative    MEDICATIONS  (STANDING):  aMIOdarone    Tablet 200 milliGRAM(s) Oral daily  carvedilol 3.125 milliGRAM(s) Oral every 12 hours  cefepime   IVPB      cefepime   IVPB 2000 milliGRAM(s) IV Intermittent every 8 hours  chlorhexidine 2% Cloths 1 Application(s) Topical daily  dexAMETHasone  Injectable 6 milliGRAM(s) IV Push <User Schedule>  famotidine    Tablet 20 milliGRAM(s) Oral daily  midodrine. 5 milliGRAM(s) Oral three times a day  morphine PCA (5 mG/mL) 30 milliLiter(s) PCA Continuous PCA Continuous  OLANZapine Disintegrating Tablet 5 milliGRAM(s) Oral at bedtime    MEDICATIONS  (PRN):  acetaminophen     Tablet .. 650 milliGRAM(s) Oral every 6 hours PRN Temp greater or equal to 38C (100.4F), Mild Pain (1 - 3)  melatonin 3 milliGRAM(s) Oral at bedtime PRN Insomnia  morphine  - Injectable 2 milliGRAM(s) IV Push every 1 hour PRN Breakthrough pain  senna Syrup 10 milliLiter(s) Oral at bedtime PRN Constipation  simethicone 80 milliGRAM(s) Chew daily PRN Indigestion  sodium chloride 0.9% lock flush 10 milliLiter(s) IV Push every 1 hour PRN Pre/post blood products, medications, blood draw, and to maintain line patency    PHYSICAL EXAM:  Vital Signs Last 24 Hrs  T(C): 36.8 (02 May 2025 10:57), Max: 36.9 (01 May 2025 12:29)  T(F): 98.2 (02 May 2025 10:57), Max: 98.5 (01 May 2025 12:29)  HR: 58 (02 May 2025 10:57) (58 - 127)  BP: 108/67 (02 May 2025 10:57) (80/62 - 113/73)  BP(mean): --  RR: 17 (02 May 2025 10:57) (16 - 20)  SpO2: 100% (02 May 2025 10:57) (88% - 100%)    Parameters below as of 02 May 2025 10:57  Patient On (Oxygen Delivery Method): room air     Palliative Performance Scale/Karnofsky Score: 20%  ECOG Performance: 4     General: chronically ill appearing male in bed with c/o right hip and leg pain in bed c/o moderate back pain  HEENT: A/T, N/C anicteric, mm dry  Neck: supple no JVD   Cardiovascular: l S1, S2,tachycardic  no murmur, + 4 edema to BLE/BUE with weeping,   full body anasarca   Respiratory: CTAB, no wheezes, no rales, no rhonchi  GI:  distended, + colostomy + stooling , + venting PEG  :  + urostomy,  hematuria  Musculoskeletal: weak, moves her b/l arms without difficulty  Neurologic: awake and alert and oriented x3, non focal   Skin: Glans Penis, Stage IV Pressure Injury ; Scrotum, Stage II Pressure Injury, Left Ischium, Stage II Pressure Injury; Sacral Stage III Pressure Injury  Lymph: no lymphadenopathy   Psychiatric: calm and appropriate during exam   Oral intake: poor    LABS:                        6.9    13.86 )-----------( 207      ( 02 May 2025 01:00 )             20.6     05-02    134[L]  |  104  |  36[H]  ----------------------------<  157[H]  3.8   |  21[L]  |  1.19    Ca    7.1[L]      02 May 2025 01:00    TPro  4.8[L]  /  Alb  1.3[L]  /  TBili  0.6  /  DBili  x   /  AST  13[L]  /  ALT  8[L]  /  AlkPhos  79  05-01    Urinalysis Basic - ( 02 May 2025 01:00 )    Color: x / Appearance: x / SG: x / pH: x  Gluc: 157 mg/dL / Ketone: x  / Bili: x / Urobili: x   Blood: x / Protein: x / Nitrite: x   Leuk Esterase: x / RBC: x / WBC x   Sq Epi: x / Non Sq Epi: x / Bacteria: x    RADIOLOGY & ADDITIONAL STUDIES: reviewed

## 2025-05-02 NOTE — PROGRESS NOTE ADULT - SUBJECTIVE AND OBJECTIVE BOX
Medicine Progress Note    Patient is a 59y old  Male who presents with a chief complaint of Hematuria (01 May 2025 16:55)    Pt offers up no complaints.  Per patient and family member at bedside, urine is the same color (bloody) from yesterday.        CAPILLARY BLOOD GLUCOSE      POCT Blood Glucose.: 122 mg/dL (01 May 2025 10:05)    I&O's Summary    01 May 2025 07:01  -  02 May 2025 07:00  --------------------------------------------------------  IN: 0 mL / OUT: 1105 mL / NET: -1105 mL        PHYSICAL EXAM:  Vital Signs Last 24 Hrs  T(C): 36.4 (02 May 2025 07:45), Max: 36.9 (01 May 2025 12:29)  T(F): 97.5 (02 May 2025 07:45), Max: 98.5 (01 May 2025 12:29)  HR: 78 (02 May 2025 07:45) (78 - 130)  BP: 113/73 (02 May 2025 07:45) (80/62 - 113/73)  BP(mean): --  RR: 16 (02 May 2025 07:45) (16 - 20)  SpO2: 99% (02 May 2025 07:45) (88% - 100%)    Parameters below as of 02 May 2025 07:45  Patient On (Oxygen Delivery Method): room air        CONSTITUTIONAL: cachexia  RESPIRATORY: unlabored breathing  CARDIOVASCULAR: Regular rate   ABDOMEN: markedly distended, non tender to light palaption  Urologic: Urostomy bloody, flowing      LABS:                        6.9    13.86 )-----------( 207      ( 02 May 2025 01:00 )             20.6     05-02    134[L]  |  104  |  36[H]  ----------------------------<  157[H]  3.8   |  21[L]  |  1.19    Ca    7.1[L]      02 May 2025 01:00    TPro  4.8[L]  /  Alb  1.3[L]  /  TBili  0.6  /  DBili  x   /  AST  13[L]  /  ALT  8[L]  /  AlkPhos  79  05-01    PT/INR - ( 02 May 2025 01:00 )   PT: 13.2 sec;   INR: 1.14 ratio         PTT - ( 02 May 2025 01:00 )  PTT:22.9 sec  CARDIAC MARKERS ( 01 May 2025 06:18 )  x     / x     / x     / x     / <1.0 ng/mL      Urinalysis Basic - ( 02 May 2025 01:00 )    Color: x / Appearance: x / SG: x / pH: x  Gluc: 157 mg/dL / Ketone: x  / Bili: x / Urobili: x   Blood: x / Protein: x / Nitrite: x   Leuk Esterase: x / RBC: x / WBC x   Sq Epi: x / Non Sq Epi: x / Bacteria: x        Culture - Blood (collected 30 Apr 2025 13:35)  Source: Blood Blood-Peripheral  Preliminary Report (01 May 2025 19:01):    No growth at 24 hours    Culture - Blood (collected 30 Apr 2025 13:30)  Source: Blood Blood-Peripheral  Preliminary Report (01 May 2025 19:01):    No growth at 24 hours

## 2025-05-03 PROCEDURE — 99232 SBSQ HOSP IP/OBS MODERATE 35: CPT

## 2025-05-03 RX ADMIN — Medication 30 MILLILITER(S): at 20:27

## 2025-05-03 RX ADMIN — Medication 30 MILLILITER(S): at 12:20

## 2025-05-03 RX ADMIN — CARVEDILOL 3.12 MILLIGRAM(S): 3.12 TABLET, FILM COATED ORAL at 06:28

## 2025-05-03 RX ADMIN — MIDODRINE HYDROCHLORIDE 5 MILLIGRAM(S): 5 TABLET ORAL at 06:27

## 2025-05-03 RX ADMIN — Medication 500 MILLIGRAM(S): at 19:45

## 2025-05-03 RX ADMIN — MIDODRINE HYDROCHLORIDE 5 MILLIGRAM(S): 5 TABLET ORAL at 19:46

## 2025-05-03 RX ADMIN — Medication 20 MILLIGRAM(S): at 14:38

## 2025-05-03 RX ADMIN — Medication 1 APPLICATION(S): at 14:46

## 2025-05-03 RX ADMIN — DEXAMETHASONE 6 MILLIGRAM(S): 0.5 TABLET ORAL at 06:27

## 2025-05-03 RX ADMIN — Medication 30 MILLILITER(S): at 19:40

## 2025-05-03 RX ADMIN — MIDODRINE HYDROCHLORIDE 5 MILLIGRAM(S): 5 TABLET ORAL at 14:42

## 2025-05-03 RX ADMIN — Medication 500 MILLIGRAM(S): at 06:27

## 2025-05-03 RX ADMIN — DEXAMETHASONE 6 MILLIGRAM(S): 0.5 TABLET ORAL at 14:39

## 2025-05-03 RX ADMIN — OLANZAPINE 5 MILLIGRAM(S): 10 TABLET ORAL at 22:12

## 2025-05-03 RX ADMIN — Medication 30 MILLILITER(S): at 07:17

## 2025-05-03 RX ADMIN — AMIODARONE HYDROCHLORIDE 200 MILLIGRAM(S): 50 INJECTION, SOLUTION INTRAVENOUS at 06:42

## 2025-05-03 NOTE — PROGRESS NOTE ADULT - SUBJECTIVE AND OBJECTIVE BOX
Patient seen and examined bedside resting comfortably.  No complaints offered.   Urostomy bag in place.   Denies N/V, chest pain, dyspnea, cough.    T(F): 97.4 (05-03-25 @ 16:31), Max: 97.6 (05-03-25 @ 04:30)  HR: 73 (05-03-25 @ 16:31) (72 - 77)  BP: 83/66 (05-03-25 @ 14:45) (83/66 - 104/72)  RR: 18 (05-03-25 @ 16:31) (18 - 20)  SpO2: 100% (05-03-25 @ 16:31) (96% - 100%)  Wt(kg): --  CAPILLARY BLOOD GLUCOSE          PHYSICAL EXAM:  General: NAD, cachectic  HEENT: NCAT, EOMI, conjunctiva clear  Chest: Nonlabored respirations, good inspiratory effort  Abdomen: Soft, distended with urostomy in place draining rust-colored urine with ostomy pink, viable   Extremities: No pedal edema or calf tenderness noted   : Deformed phallus with serous weeping; No CVA or SP tenderness.     LABS:                        6.9    13.86 )-----------( 207      ( 02 May 2025 01:00 )             20.6   05-02    134[L]  |  104  |  36[H]  ----------------------------<  157[H]  3.8   |  21[L]  |  1.19    Ca    7.1[L]      02 May 2025 01:00    PT/INR - ( 02 May 2025 01:00 )   PT: 13.2 sec;   INR: 1.14 ratio         PTT - ( 02 May 2025 01:00 )  PTT:22.9 sec  I&O's Detail    02 May 2025 07:01  -  03 May 2025 07:00  --------------------------------------------------------  IN:    Oral Fluid: 360 mL  Total IN: 360 mL    OUT:    Colostomy (mL): 80 mL    Drain (mL): 540 mL    Stool (mL): 100 mL    Urostomy (mL): 2000 mL  Total OUT: 2720 mL    Total NET: -2360 mL      03 May 2025 07:01  -  03 May 2025 18:32  --------------------------------------------------------  IN:  Total IN: 0 mL    OUT:    Urostomy (mL): 280 mL  Total OUT: 280 mL    Total NET: -280 mL

## 2025-05-03 NOTE — PROGRESS NOTE ADULT - ASSESSMENT
58 Y/O male with PMHx colorectal CA s/p colostomy creation, urethral stricture s/p urostomy bag and b/l retrograde nephrostomy, severe AS/bicuspid AV, TAA, HTN, HLD, pAF, sacral ulcer s/p wound bag presented with hematuria in urostomy bag.  BCx reveal pseudomonas. UCx reveals pseudomonas.    CTA from 4/30 reveals no active hemorrhage. No acute urologic intervention needed at this time. Given urine is now with rust-colored likely due to old blood and CTA negative for hemorrhage, no sign of active bleeding within urologic tract. Patient is poor surgical candidate with poor prognosis.   Patient and family not looking to pursue any further invasive intervention at this time. Given [facility] conversation, patient wishes to return home on hospice care measures and does not wish to endure further testing or interventions.       PLAN:     - NO further urologic intervention needed at this time; will sign off; please reconsult PRN   - Multimodal analgesia   - Comfort measures  - Maintain hydration  - Continue antibiotics per ID  - Continue care per primary team  - Discussed with Dr. Hanna

## 2025-05-03 NOTE — PROGRESS NOTE ADULT - ASSESSMENT
59M w/ PMHx of colorectal CA s/p colostomy on hospice care, urethral stricture s/p urostomy bag, severe AS/Bicuspid AV, TAA, HTN, HLD, pAfib, sacral ulcer s/p wound bag and right hip fracture who presents to the ED with bleeding from urostomy bag. Admitted to medicine for further management and monitoring. Detailed plan as below:     Painless hematuria. and acute blood loss anemia:  Sepsis POA  Possibly 2/2 UTI vs invasive malignancy   CT with no stone, no hydro, stent in place  -Urine Clx with GNR, and Blood cultures with pseudomonas  -On Zosyn, dose increased , one dose amikacin given per ID  - transfused 2 units for acute blood loss anemia Hb 6.0  - Hb improved   - Hematuria recurred  - CT noted: Extensive invasive cancer with severe anasarca.   - Discussed with daughter at length , will farhan fox repeat Ct with contrast for now  - Goal is to treat infection and pain control and dc back to home hospice  Will stop trending labs given plan for home w/ hospice. Family in agreement.   - ID and urology on board  - Switched Cefepime to Augmentin  - Appreciate heme onc and palliative recs  - Increased morphine gtt and dexamethasone    Malignant Colorectal cancer:  colorectal CA s/p colostomy on hospice care  -Oncologist:: Dr Aneudy Contreras at [facility] [place]   -Urologist: Dr Isacc Keith at [facility] [place]  -On home Morphine pump  and fentanyl patch 200 mcg / 72 hours   - palliative on board for pain management   -c/w home simethicone, famotidine, olanzapine 5 mg ODT QHS      Hypotension:  -s/p midodrine 10 mg PO in ED , will continue for now to allow room for pain med  -Closely monitor BP.    HLD:  Continue with home atorvastatin 20 mg QHS.    Paroxysmal atrial fibrillation.   -Continue with home amiodarone 100 mg QD   -No anticoagulation given hematuria       Sacral ulcer.   -Monitor wound bag  -Wound care consultation placed.    VTE PPx: SCD  Nutrition: DASH/TLC Diet  Fluids: PRN  Electrolytes: Maintain K>4, Mag >2, Phos > 3  Access: PIV    Pt is DNR/DNI with NIV trial ( see detailed form)    Dispo: pending clinical improvement.  palliative consulted for assistance with pain management.    Discussed at length with family at bedside. prognosis poor.  Pending home hospice setup

## 2025-05-03 NOTE — PROGRESS NOTE ADULT - SUBJECTIVE AND OBJECTIVE BOX
Patient is a 59y old  Male who presents with a chief complaint of Hematuria (02 May 2025 15:38)    INTERVAL HPI/OVERNIGHT EVENTS: No acute events overnight. HD stable.     MEDICATIONS  (STANDING):  aMIOdarone    Tablet 200 milliGRAM(s) Oral daily  carvedilol 3.125 milliGRAM(s) Oral every 12 hours  chlorhexidine 2% Cloths 1 Application(s) Topical daily  ciprofloxacin     Tablet 500 milliGRAM(s) Oral every 12 hours  dexAMETHasone  Injectable 6 milliGRAM(s) IV Push <User Schedule>  famotidine    Tablet 20 milliGRAM(s) Oral daily  midodrine. 5 milliGRAM(s) Oral three times a day  morphine PCA (5 mG/mL) 30 milliLiter(s) PCA Continuous PCA Continuous  OLANZapine Disintegrating Tablet 5 milliGRAM(s) Oral at bedtime    MEDICATIONS  (PRN):  acetaminophen     Tablet .. 650 milliGRAM(s) Oral every 6 hours PRN Temp greater or equal to 38C (100.4F), Mild Pain (1 - 3)  melatonin 3 milliGRAM(s) Oral at bedtime PRN Insomnia  morphine  - Injectable 2 milliGRAM(s) IV Push every 1 hour PRN Breakthrough pain  senna Syrup 10 milliLiter(s) Oral at bedtime PRN Constipation  simethicone 80 milliGRAM(s) Chew daily PRN Indigestion  sodium chloride 0.9% lock flush 10 milliLiter(s) IV Push every 1 hour PRN Pre/post blood products, medications, blood draw, and to maintain line patency      Allergies    No Known Allergies    Intolerances        REVIEW OF SYSTEMS: all negative with exception of above    Vital Signs Last 24 Hrs  T(C): 36.4 (03 May 2025 04:30), Max: 36.4 (02 May 2025 23:47)  T(F): 97.6 (03 May 2025 04:30), Max: 97.6 (03 May 2025 04:30)  HR: 77 (03 May 2025 04:30) (72 - 81)  BP: 101/70 (03 May 2025 04:30) (99/68 - 104/72)  BP(mean): --  RR: 18 (03 May 2025 04:30) (18 - 18)  SpO2: 98% (03 May 2025 04:30) (98% - 99%)    Parameters below as of 03 May 2025 04:30  Patient On (Oxygen Delivery Method): room air        PHYSICAL EXAM:  GENERAL: NAD, well-groomed, well-developed  HEAD:  Atraumatic, Normocephalic  EYES: EOMI, PERRLA, conjunctiva and sclera clear  ENMT: No tonsillar erythema, exudates, or enlargement; Moist mucous membranes, Good dentition, No lesions  NECK: Supple, No JVD, Normal thyroid  NERVOUS SYSTEM:  Alert & Oriented X3, Good concentration; Motor Strength 5/5 B/L upper and lower extremities; DTRs 2+ intact and symmetric  CHEST/LUNG: Clear to percussion bilaterally; No rales, rhonchi, wheezing, or rubs  HEART: Regular rate and rhythm; No murmurs, rubs, or gallops  ABDOMEN: Soft, Nontender, Nondistended; Bowel sounds present  EXTREMITIES:  2+ Peripheral Pulses, No clubbing, cyanosis, or edema    LABS:                        6.9    13.86 )-----------( 207      ( 02 May 2025 01:00 )             20.6     05-02    134[L]  |  104  |  36[H]  ----------------------------<  157[H]  3.8   |  21[L]  |  1.19    Ca    7.1[L]      02 May 2025 01:00      PT/INR - ( 02 May 2025 01:00 )   PT: 13.2 sec;   INR: 1.14 ratio         PTT - ( 02 May 2025 01:00 )  PTT:22.9 sec  Urinalysis Basic - ( 02 May 2025 01:00 )    Color: x / Appearance: x / SG: x / pH: x  Gluc: 157 mg/dL / Ketone: x  / Bili: x / Urobili: x   Blood: x / Protein: x / Nitrite: x   Leuk Esterase: x / RBC: x / WBC x   Sq Epi: x / Non Sq Epi: x / Bacteria: x      CAPILLARY BLOOD GLUCOSE          RADIOLOGY & ADDITIONAL TESTS:    Imaging Personally Reviewed:  [ ] YES  [ ] NO    Consultant(s) Notes Reviewed:  [ ] YES  [ ] NO    Care Discussed with Consultants/Other Providers [ ] YES  [ ] NO

## 2025-05-04 PROCEDURE — 99232 SBSQ HOSP IP/OBS MODERATE 35: CPT

## 2025-05-04 RX ADMIN — Medication 30 MILLILITER(S): at 07:30

## 2025-05-04 RX ADMIN — Medication 20 MILLIGRAM(S): at 12:08

## 2025-05-04 RX ADMIN — Medication 30 MILLILITER(S): at 22:54

## 2025-05-04 RX ADMIN — OLANZAPINE 5 MILLIGRAM(S): 10 TABLET ORAL at 22:08

## 2025-05-04 RX ADMIN — Medication 500 MILLIGRAM(S): at 06:37

## 2025-05-04 RX ADMIN — AMIODARONE HYDROCHLORIDE 200 MILLIGRAM(S): 50 INJECTION, SOLUTION INTRAVENOUS at 06:37

## 2025-05-04 RX ADMIN — MIDODRINE HYDROCHLORIDE 5 MILLIGRAM(S): 5 TABLET ORAL at 06:37

## 2025-05-04 RX ADMIN — Medication 500 MILLIGRAM(S): at 18:17

## 2025-05-04 RX ADMIN — Medication 1 APPLICATION(S): at 13:54

## 2025-05-04 RX ADMIN — MIDODRINE HYDROCHLORIDE 5 MILLIGRAM(S): 5 TABLET ORAL at 12:08

## 2025-05-04 RX ADMIN — MIDODRINE HYDROCHLORIDE 5 MILLIGRAM(S): 5 TABLET ORAL at 18:16

## 2025-05-04 RX ADMIN — Medication 30 MILLILITER(S): at 11:49

## 2025-05-04 RX ADMIN — DEXAMETHASONE 6 MILLIGRAM(S): 0.5 TABLET ORAL at 06:37

## 2025-05-04 RX ADMIN — CARVEDILOL 3.12 MILLIGRAM(S): 3.12 TABLET, FILM COATED ORAL at 06:37

## 2025-05-04 RX ADMIN — Medication 30 MILLILITER(S): at 19:56

## 2025-05-04 RX ADMIN — DEXAMETHASONE 6 MILLIGRAM(S): 0.5 TABLET ORAL at 13:47

## 2025-05-04 NOTE — PROGRESS NOTE ADULT - ASSESSMENT
59M w/ PMHx of colorectal CA s/p colostomy on hospice care, urethral stricture s/p urostomy bag, severe AS/Bicuspid AV, TAA, HTN, HLD, pAfib, sacral ulcer s/p wound bag and right hip fracture who presents to the ED with bleeding from urostomy bag. Admitted to medicine for further management and monitoring. Detailed plan as below:     Painless hematuria. and acute blood loss anemia:  Sepsis POA  Possibly 2/2 UTI vs invasive malignancy   CT with no stone, no hydro, stent in place  -Urine Clx with GNR, and Blood cultures with pseudomonas  -On Zosyn, dose increased , one dose amikacin given per ID  - transfused 2 units for acute blood loss anemia Hb 6.0  - Hb improved   - Hematuria recurred  - CT noted: Extensive invasive cancer with severe anasarca.   - Discussed with daughter at length , will farhna fox repeat Ct with contrast for now  - Goal is to treat infection and pain control and dc back to home hospice  Will stop trending labs given plan for home w/ hospice. Family in agreement.   - ID and urology on board  - Switched Cefepime to Augmentin  - Appreciate heme onc and palliative recs  - Increased morphine gtt and dexamethasone    Malignant Colorectal cancer:  colorectal CA s/p colostomy on hospice care  -Oncologist:: Dr Aneudy Contreras at [facility] [place]   -Urologist: Dr Isacc Keith at [facility] [place]  -On home Morphine pump  and fentanyl patch 200 mcg / 72 hours   - palliative on board for pain management   -c/w home simethicone, famotidine, olanzapine 5 mg ODT QHS      Hypotension:  -s/p midodrine 10 mg PO in ED , will continue for now to allow room for pain med  -Closely monitor BP.    HLD:  Continue with home atorvastatin 20 mg QHS.    Paroxysmal atrial fibrillation.   -Continue with home amiodarone 100 mg QD   -No anticoagulation given hematuria       Sacral ulcer.   -Monitor wound bag  -Wound care consultation placed.    VTE PPx: SCD  Nutrition: DASH/TLC Diet  Fluids: PRN  Electrolytes: Maintain K>4, Mag >2, Phos > 3  Access: PIV    Pt is DNR/DNI with NIV trial ( see detailed form)    Dispo: pending clinical improvement.  palliative consulted for assistance with pain management.    Discussed at length with family at bedside. prognosis poor.  Pending home hospice setup

## 2025-05-04 NOTE — PROGRESS NOTE ADULT - SUBJECTIVE AND OBJECTIVE BOX
Patient is a 59y old  Male who presents with a chief complaint of Hematuria (04 May 2025 09:03)    INTERVAL HPI/OVERNIGHT EVENTS: No acute events overnight. HD stable.     MEDICATIONS  (STANDING):  aMIOdarone    Tablet 200 milliGRAM(s) Oral daily  carvedilol 3.125 milliGRAM(s) Oral every 12 hours  chlorhexidine 2% Cloths 1 Application(s) Topical daily  ciprofloxacin     Tablet 500 milliGRAM(s) Oral every 12 hours  dexAMETHasone  Injectable 6 milliGRAM(s) IV Push <User Schedule>  famotidine    Tablet 20 milliGRAM(s) Oral daily  midodrine. 5 milliGRAM(s) Oral three times a day  morphine PCA (5 mG/mL) 30 milliLiter(s) PCA Continuous PCA Continuous  OLANZapine Disintegrating Tablet 5 milliGRAM(s) Oral at bedtime    MEDICATIONS  (PRN):  acetaminophen     Tablet .. 650 milliGRAM(s) Oral every 6 hours PRN Temp greater or equal to 38C (100.4F), Mild Pain (1 - 3)  melatonin 3 milliGRAM(s) Oral at bedtime PRN Insomnia  morphine  - Injectable 2 milliGRAM(s) IV Push every 1 hour PRN Breakthrough pain  senna Syrup 10 milliLiter(s) Oral at bedtime PRN Constipation  simethicone 80 milliGRAM(s) Chew daily PRN Indigestion  sodium chloride 0.9% lock flush 10 milliLiter(s) IV Push every 1 hour PRN Pre/post blood products, medications, blood draw, and to maintain line patency      Allergies    No Known Allergies    Intolerances        REVIEW OF SYSTEMS: all negative with exception of above    Vital Signs Last 24 Hrs  T(C): 36.4 (04 May 2025 11:20), Max: 36.4 (03 May 2025 14:45)  T(F): 97.5 (04 May 2025 11:20), Max: 97.5 (03 May 2025 14:45)  HR: 71 (04 May 2025 11:20) (71 - 77)  BP: 106/71 (04 May 2025 11:20) (83/66 - 118/80)  BP(mean): --  RR: 17 (04 May 2025 11:20) (17 - 20)  SpO2: 98% (04 May 2025 11:20) (96% - 100%)    Parameters below as of 04 May 2025 05:30  Patient On (Oxygen Delivery Method): room air    PHYSICAL EXAM:  GENERAL: NAD, well-groomed  NERVOUS SYSTEM:  Alert & Oriented X3, Good concentration; Motor Strength 5/5 B/L upper and lower extremities; DTRs 2+ intact and symmetric  CHEST/LUNG: Clear to percussion bilaterally; No rales, rhonchi, wheezing, or rubs  HEART: Regular rate and rhythm; No murmurs, rubs, or gallops  ABDOMEN: Soft, Nontender, Nondistended; Bowel sounds present  EXTREMITIES:  2+ Peripheral Pulses, No clubbing, cyanosis, or edema    LABS:              CAPILLARY BLOOD GLUCOSE          RADIOLOGY & ADDITIONAL TESTS:    Imaging Personally Reviewed:  [ ] YES  [ ] NO    Consultant(s) Notes Reviewed:  [ ] YES  [ ] NO    Care Discussed with Consultants/Other Providers [ ] YES  [ ] NO

## 2025-05-04 NOTE — PROGRESS NOTE ADULT - SUBJECTIVE AND OBJECTIVE BOX
Patient seen and examined this am. No new events imrpovign    MEDICATIONS:    acetaminophen     Tablet .. 650 milliGRAM(s) Oral every 6 hours PRN  aMIOdarone    Tablet 200 milliGRAM(s) Oral daily  carvedilol 3.125 milliGRAM(s) Oral every 12 hours  chlorhexidine 2% Cloths 1 Application(s) Topical daily  ciprofloxacin     Tablet 500 milliGRAM(s) Oral every 12 hours  dexAMETHasone  Injectable 6 milliGRAM(s) IV Push <User Schedule>  famotidine    Tablet 20 milliGRAM(s) Oral daily  melatonin 3 milliGRAM(s) Oral at bedtime PRN  midodrine. 5 milliGRAM(s) Oral three times a day  morphine  - Injectable 2 milliGRAM(s) IV Push every 1 hour PRN  morphine PCA (5 mG/mL) 30 milliLiter(s) PCA Continuous PCA Continuous  OLANZapine Disintegrating Tablet 5 milliGRAM(s) Oral at bedtime  senna Syrup 10 milliLiter(s) Oral at bedtime PRN  simethicone 80 milliGRAM(s) Chew daily PRN  sodium chloride 0.9% lock flush 10 milliLiter(s) IV Push every 1 hour PRN      LABS:            CAPILLARY BLOOD GLUCOSE            I&O's Summary    03 May 2025 07:01  -  04 May 2025 07:00  --------------------------------------------------------  IN: 240 mL / OUT: 1190 mL / NET: -950 mL      Vital Signs Last 24 Hrs  T(C): 36.2 (04 May 2025 05:30), Max: 36.4 (03 May 2025 14:45)  T(F): 97.1 (04 May 2025 05:30), Max: 97.5 (03 May 2025 14:45)  HR: 77 (04 May 2025 05:30) (72 - 77)  BP: 118/80 (04 May 2025 05:30) (83/66 - 118/80)  BP(mean): --  RR: 18 (04 May 2025 05:30) (18 - 20)  SpO2: 98% (04 May 2025 05:30) (96% - 100%)    Parameters below as of 04 May 2025 05:30  Patient On (Oxygen Delivery Method): room air        Neurological Exam:  Mental Status: Eyes copen , oriented x 2, follows simple commands with prompting, psychomotor slowing, no dysarthria, no aphasia.    Cranial Nerves: PERRL, EOMI, VFFC, sensation V1-V3 intact,  no obvious facial asymmetry  Motor: dec bulk throughout, moves uppers antigravity, lowers slight in plane of bed (bedbound baseline)  Sensation: Intact to light touch  Coordination: unable  Gait: deferred    < from: CT Head No Cont (05.01.25 @ 10:48) >    IMPRESSION:  Mild chronic microvascular changes without evidence of an   acute transcortical infarction or hemorrhage.    --- End of Report ---        < end of copied text >

## 2025-05-04 NOTE — PROGRESS NOTE ADULT - ASSESSMENT
59M w/ PMHx of colorectal CA s/p colostomy on hospice care, urethral stricture s/p urostomy bag, severe AS/Bicuspid AV, TAA, HTN, HLD, pAfib, sacral ulcer s/p wound bag and right hip fracture who presents to the ED with bleeding from urostomy bag around 1830. History is obtained from daughter at bedside. Per daughter, there has been increased bleeding and clots in the urostomy bsg since yesterday. Patient has has history of multiple antibiotic resistance UTI.  Hospital course complicated by acute blood loss anemia requiring Eliquis to be held  Stroke code called for blurry vision, pt also with chest pain and complaints of "not feeling right"  NIHSS 10  o/e diffusely weak, no clear lateralizing deficit or neuro focality - visual fields intact but states vision is still blurry  CTH no acute finding    Low suspicion for stroke - suspect related to tachycardia, rapid afib. Cannot rule out minor stroke due AFib and hypercoaguability of malignancy off of AC  - CTH no aucte findingia  - pain and symptomatic control  - mri brain unlikely to change mgmt at this time  - can resume Eliquis when able per primary team

## 2025-05-05 LAB
BLD GP AB SCN SERPL QL: SIGNIFICANT CHANGE UP
CULTURE RESULTS: SIGNIFICANT CHANGE UP
CULTURE RESULTS: SIGNIFICANT CHANGE UP
HCT VFR BLD CALC: 23.5 % — LOW (ref 39–50)
HGB BLD-MCNC: 7.9 G/DL — LOW (ref 13–17)
MCHC RBC-ENTMCNC: 29.8 PG — SIGNIFICANT CHANGE UP (ref 27–34)
MCHC RBC-ENTMCNC: 33.6 G/DL — SIGNIFICANT CHANGE UP (ref 32–36)
MCV RBC AUTO: 88.7 FL — SIGNIFICANT CHANGE UP (ref 80–100)
NRBC BLD AUTO-RTO: 0 /100 WBCS — SIGNIFICANT CHANGE UP (ref 0–0)
PLATELET # BLD AUTO: 347 K/UL — SIGNIFICANT CHANGE UP (ref 150–400)
RBC # BLD: 2.65 M/UL — LOW (ref 4.2–5.8)
RBC # FLD: 19 % — HIGH (ref 10.3–14.5)
SPECIMEN SOURCE: SIGNIFICANT CHANGE UP
SPECIMEN SOURCE: SIGNIFICANT CHANGE UP
WBC # BLD: 28.2 K/UL — HIGH (ref 3.8–10.5)
WBC # FLD AUTO: 28.2 K/UL — HIGH (ref 3.8–10.5)

## 2025-05-05 PROCEDURE — 99232 SBSQ HOSP IP/OBS MODERATE 35: CPT

## 2025-05-05 PROCEDURE — 99233 SBSQ HOSP IP/OBS HIGH 50: CPT

## 2025-05-05 RX ADMIN — OLANZAPINE 5 MILLIGRAM(S): 10 TABLET ORAL at 21:10

## 2025-05-05 RX ADMIN — DEXAMETHASONE 6 MILLIGRAM(S): 0.5 TABLET ORAL at 13:34

## 2025-05-05 RX ADMIN — Medication 30 MILLILITER(S): at 17:14

## 2025-05-05 RX ADMIN — MIDODRINE HYDROCHLORIDE 5 MILLIGRAM(S): 5 TABLET ORAL at 18:51

## 2025-05-05 RX ADMIN — Medication 30 MILLILITER(S): at 19:24

## 2025-05-05 RX ADMIN — AMIODARONE HYDROCHLORIDE 200 MILLIGRAM(S): 50 INJECTION, SOLUTION INTRAVENOUS at 05:57

## 2025-05-05 RX ADMIN — MIDODRINE HYDROCHLORIDE 5 MILLIGRAM(S): 5 TABLET ORAL at 13:33

## 2025-05-05 RX ADMIN — Medication 30 MILLILITER(S): at 09:57

## 2025-05-05 RX ADMIN — DEXAMETHASONE 6 MILLIGRAM(S): 0.5 TABLET ORAL at 05:57

## 2025-05-05 RX ADMIN — MIDODRINE HYDROCHLORIDE 5 MILLIGRAM(S): 5 TABLET ORAL at 08:18

## 2025-05-05 RX ADMIN — Medication 20 MILLIGRAM(S): at 13:33

## 2025-05-05 RX ADMIN — CARVEDILOL 3.12 MILLIGRAM(S): 3.12 TABLET, FILM COATED ORAL at 05:57

## 2025-05-05 RX ADMIN — Medication 500 MILLIGRAM(S): at 05:57

## 2025-05-05 RX ADMIN — Medication 1 APPLICATION(S): at 13:43

## 2025-05-05 NOTE — PROGRESS NOTE ADULT - ASSESSMENT
59M w/ PMHx of colorectal CA s/p colostomy on hospice care, urethral stricture s/p urostomy bag, severe AS/Bicuspid AV, TAA, HTN, HLD, pAfib, sacral ulcer s/p wound bag and right hip fracture who presents to the ED with bleeding from urostomy bag. Admitted to medicine for further management and monitoring. Detailed plan as below:     Painless hematuria. and acute blood loss anemia:  Sepsis POA  Possibly 2/2 UTI vs invasive malignancy   CT with no stone, no hydro, stent in place  -Urine Clx with GNR, and Blood cultures with pseudomonas  -On Zosyn, dose increased , one dose amikacin given per ID  - transfused 2 units for acute blood loss anemia Hb 6.0  - Hb improved   - Hematuria recurred  - CT noted: Extensive invasive cancer with severe anasarca.   - Discussed with daughter at length , will farhan fox repeat Ct with contrast for now  - Goal is to treat infection and pain control and dc back to home hospice  Will stop trending labs given plan for home w/ hospice. Family in agreement.   - ID and urology on board  - Switched Cefepime to Augmentin  - Appreciate heme onc and palliative recs  - Increased morphine gtt and dexamethasone    Malignant Colorectal cancer:  colorectal CA s/p colostomy on hospice care  -Oncologist:: Dr Aneudy Contreras at [facility]   -Urologist: Dr Isacc Keith at [facility]  -On home Morphine pump  and fentanyl patch 200 mcg / 72 hours   - palliative on board for pain management   -c/w home simethicone, famotidine, olanzapine 5 mg ODT QHS      Hypotension:  -s/p midodrine 10 mg PO in ED , will continue for now to allow room for pain med  -Closely monitor BP.    HLD:  Continue with home atorvastatin 20 mg QHS.    Paroxysmal atrial fibrillation.   -Continue with home amiodarone 100 mg QD   -No anticoagulation given hematuria       Sacral ulcer.   -Monitor wound bag  -Wound care consultation placed.    VTE PPx: SCD  Nutrition: DASH/TLC Diet  Fluids: PRN  Electrolytes: Maintain K>4, Mag >2, Phos > 3  Access: PIV    Pt is DNR/DNI with NIV trial ( see detailed form)    Dispo: pending clinical improvement.  palliative consulted for assistance with pain management.    Discussed at length with family at bedside. prognosis poor.  Pending home hospice setup

## 2025-05-05 NOTE — PROGRESS NOTE ADULT - ASSESSMENT
59M w/ PMHx of colorectal CA s/p colostomy,  venting PEG, s/p urostomy bag, severe AS/Bicuspid AV, TAA, HTN, HLD, pAfib, sacral ulcer s/p wound bag and B/L  hip fracture who presents to the ED with bleeding from urostomy bag. Pt was   admitted to [facility] 5 days  for home hospice  and was started on PCA Morphine for increased pain. Pt  presents to the ED with bleeding from urostomy bag.

## 2025-05-05 NOTE — PROGRESS NOTE ADULT - SUBJECTIVE AND OBJECTIVE BOX
Patient is a 59y old  Male who presents with a chief complaint of Hematuria (05 May 2025 14:53)    INTERVAL HPI/OVERNIGHT EVENTS: No acute events overnight. HD stable.     MEDICATIONS  (STANDING):  aMIOdarone    Tablet 200 milliGRAM(s) Oral daily  carvedilol 3.125 milliGRAM(s) Oral every 12 hours  chlorhexidine 2% Cloths 1 Application(s) Topical daily  dexAMETHasone  Injectable 6 milliGRAM(s) IV Push <User Schedule>  famotidine    Tablet 20 milliGRAM(s) Oral daily  midodrine. 5 milliGRAM(s) Oral three times a day  morphine PCA (5 mG/mL) 30 milliLiter(s) PCA Continuous PCA Continuous  OLANZapine Disintegrating Tablet 5 milliGRAM(s) Oral at bedtime    MEDICATIONS  (PRN):  acetaminophen     Tablet .. 650 milliGRAM(s) Oral every 6 hours PRN Temp greater or equal to 38C (100.4F), Mild Pain (1 - 3)  melatonin 3 milliGRAM(s) Oral at bedtime PRN Insomnia  morphine  - Injectable 2 milliGRAM(s) IV Push every 1 hour PRN Breakthrough pain  senna Syrup 10 milliLiter(s) Oral at bedtime PRN Constipation  simethicone 80 milliGRAM(s) Chew daily PRN Indigestion  sodium chloride 0.9% lock flush 10 milliLiter(s) IV Push every 1 hour PRN Pre/post blood products, medications, blood draw, and to maintain line patency      Allergies    No Known Allergies    Intolerances        REVIEW OF SYSTEMS: all negative with exception of above    Vital Signs Last 24 Hrs  T(C): 36.1 (05 May 2025 11:30), Max: 36.5 (04 May 2025 23:07)  T(F): 97 (05 May 2025 11:30), Max: 97.7 (04 May 2025 23:07)  HR: 94 (05 May 2025 11:30) (71 - 94)  BP: 84/70 (05 May 2025 11:30) (79/65 - 150/79)  BP(mean): --  RR: 15 (05 May 2025 11:30) (15 - 18)  SpO2: 96% (05 May 2025 11:30) (96% - 97%)    Parameters below as of 05 May 2025 11:30  Patient On (Oxygen Delivery Method): room air        PHYSICAL EXAM:  GENERAL: NAD, well-groomed  NERVOUS SYSTEM:  Alert & Oriented X3, Good concentration; Motor Strength 5/5 B/L upper and lower extremities; DTRs 2+ intact and symmetric  CHEST/LUNG: Clear to percussion bilaterally; No rales, rhonchi, wheezing, or rubs  HEART: Regular rate and rhythm; No murmurs, rubs, or gallops  ABDOMEN: Soft, Nontender, Nondistended; Bowel sounds present  EXTREMITIES:  2+ Peripheral Pulses, No clubbing, cyanosis, or edema    LABS:                        7.9    28.20 )-----------( 347      ( 05 May 2025 11:42 )             23.5               CAPILLARY BLOOD GLUCOSE          RADIOLOGY & ADDITIONAL TESTS:    Imaging Personally Reviewed:  [ ] YES  [ ] NO    Consultant(s) Notes Reviewed:  [ ] YES  [ ] NO    Care Discussed with Consultants/Other Providers [ ] YES  [ ] NO

## 2025-05-05 NOTE — PROGRESS NOTE ADULT - SUBJECTIVE AND OBJECTIVE BOX
follow up on:  complex medical decision making related to goals of care    OVERNIGHT EVENTS:    pt with hematuria overnight, pain well managed on present PCA dosing    Review of systems:     Pain:  [ x] yes [ ] no presently 2-3/10 on pain scale  QOL impact -   Location -                    Aggravating factors -  Quality -  Radiation -  Timing-  Severity (0-10 scale):  Minimal acceptable level (0-10 scale): 5/10    Dyspnea:      denies                        Anxiety:         denies                      Depression:   denies  Fatigue:     present                         Nausea:      denies                         Loss of appetite:  present     Constipation:     denies             Diarrhea:     denies    All other systems reviewed and negative    MEDICATIONS  (STANDING):  aMIOdarone    Tablet 200 milliGRAM(s) Oral daily  carvedilol 3.125 milliGRAM(s) Oral every 12 hours  chlorhexidine 2% Cloths 1 Application(s) Topical daily  dexAMETHasone  Injectable 6 milliGRAM(s) IV Push <User Schedule>  famotidine    Tablet 20 milliGRAM(s) Oral daily  midodrine. 5 milliGRAM(s) Oral three times a day  morphine PCA (5 mG/mL) 30 milliLiter(s) PCA Continuous PCA Continuous  OLANZapine Disintegrating Tablet 5 milliGRAM(s) Oral at bedtime    MEDICATIONS  (PRN):  acetaminophen     Tablet .. 650 milliGRAM(s) Oral every 6 hours PRN Temp greater or equal to 38C (100.4F), Mild Pain (1 - 3)  melatonin 3 milliGRAM(s) Oral at bedtime PRN Insomnia  morphine  - Injectable 2 milliGRAM(s) IV Push every 1 hour PRN Breakthrough pain  senna Syrup 10 milliLiter(s) Oral at bedtime PRN Constipation  simethicone 80 milliGRAM(s) Chew daily PRN Indigestion  sodium chloride 0.9% lock flush 10 milliLiter(s) IV Push every 1 hour PRN Pre/post blood products, medications, blood draw, and to maintain line patency    PHYSICAL EXAM:  Vital Signs Last 24 Hrs  T(C): 36.1 (05 May 2025 11:30), Max: 36.5 (04 May 2025 23:07)  T(F): 97 (05 May 2025 11:30), Max: 97.7 (04 May 2025 23:07)  HR: 94 (05 May 2025 11:30) (71 - 94)  BP: 84/70 (05 May 2025 11:30) (79/65 - 150/79)  BP(mean): --  RR: 15 (05 May 2025 11:30) (15 - 18)  SpO2: 96% (05 May 2025 11:30) (96% - 97%)    Parameters below as of 05 May 2025 11:30  Patient On (Oxygen Delivery Method): room air      Palliative Performance Scale/Karnofsky Score: 20%  ECOG Performance: 4     General: chronically ill appearing male in bed with c/o right hip and leg pain in bed c/o moderate back pain  HEENT: A/T, N/C anicteric, mm dry  Neck: supple no JVD   Cardiovascular: l S1, S2,tachycardic  no murmur, + 4 edema to BLE/BUE with weeping,  full body anasarca   Respiratory: short periods of apnea  GI:  distended, + colostomy + stooling , + venting PEG  :  + urostomy,  hematuria  Musculoskeletal: weak, moves her b/l arms without difficulty  Neurologic: oriented x3, non focal   Skin: Glans Penis, Stage IV Pressure Injury ; Scrotum, Stage II Pressure Injury, Left Ischium, Stage II Pressure Injury; Sacral Stage III Pressure Injury  Lymph: no lymphadenopathy   Psychiatric: calm and appropriate during exam   Oral intake: poor    LABS:                          7.9    28.20 )-----------( 347      ( 05 May 2025 11:42 )             23.5     RADIOLOGY & ADDITIONAL STUDIES:

## 2025-05-06 ENCOUNTER — TRANSCRIPTION ENCOUNTER (OUTPATIENT)
Age: 60
End: 2025-05-06

## 2025-05-06 VITALS
RESPIRATION RATE: 15 BRPM | SYSTOLIC BLOOD PRESSURE: 82 MMHG | TEMPERATURE: 98 F | HEART RATE: 90 BPM | DIASTOLIC BLOOD PRESSURE: 61 MMHG | OXYGEN SATURATION: 97 %

## 2025-05-06 DIAGNOSIS — R45.1 RESTLESSNESS AND AGITATION: ICD-10-CM

## 2025-05-06 PROCEDURE — 99239 HOSP IP/OBS DSCHRG MGMT >30: CPT

## 2025-05-06 PROCEDURE — 99239 HOSP IP/OBS DSCHRG MGMT >30: CPT | Mod: GW

## 2025-05-06 PROCEDURE — 99233 SBSQ HOSP IP/OBS HIGH 50: CPT

## 2025-05-06 RX ORDER — ACETAMINOPHEN 500 MG/5ML
2 LIQUID (ML) ORAL
Qty: 0 | Refills: 0 | DISCHARGE
Start: 2025-05-06

## 2025-05-06 RX ORDER — OLANZAPINE 10 MG/1
1 TABLET ORAL
Refills: 0 | DISCHARGE

## 2025-05-06 RX ORDER — AMIODARONE HYDROCHLORIDE 50 MG/ML
2 INJECTION, SOLUTION INTRAVENOUS
Refills: 0 | DISCHARGE

## 2025-05-06 RX ORDER — AMOXICILLIN AND CLAVULANATE POTASSIUM 500; 125 MG/1; MG/1
5 TABLET, FILM COATED ORAL
Refills: 0 | DISCHARGE

## 2025-05-06 RX ORDER — METOPROLOL SUCCINATE 50 MG/1
0.5 TABLET, EXTENDED RELEASE ORAL
Qty: 30 | Refills: 0
Start: 2025-05-06 | End: 2025-06-04

## 2025-05-06 RX ORDER — LORAZEPAM 4 MG/ML
0.5 VIAL (ML) INJECTION THREE TIMES A DAY
Refills: 0 | Status: DISCONTINUED | OUTPATIENT
Start: 2025-05-06 | End: 2025-05-06

## 2025-05-06 RX ORDER — LORAZEPAM 4 MG/ML
1 VIAL (ML) INJECTION EVERY 4 HOURS
Refills: 0 | Status: DISCONTINUED | OUTPATIENT
Start: 2025-05-06 | End: 2025-05-06

## 2025-05-06 RX ADMIN — MIDODRINE HYDROCHLORIDE 5 MILLIGRAM(S): 5 TABLET ORAL at 06:07

## 2025-05-06 RX ADMIN — Medication 1 MILLIGRAM(S): at 10:31

## 2025-05-06 RX ADMIN — Medication 30 MILLILITER(S): at 07:14

## 2025-05-06 RX ADMIN — AMIODARONE HYDROCHLORIDE 200 MILLIGRAM(S): 50 INJECTION, SOLUTION INTRAVENOUS at 06:07

## 2025-05-06 RX ADMIN — DEXAMETHASONE 6 MILLIGRAM(S): 0.5 TABLET ORAL at 06:07

## 2025-05-06 RX ADMIN — Medication 30 MILLILITER(S): at 03:33

## 2025-05-06 NOTE — PROGRESS NOTE ADULT - PROBLEM SELECTOR PLAN 3
unclear source of bleeding,  following, s/p 3uprbc, monitor Hb. Ongoing transfusions likely to be more burdensome than beneficial given terminal condition and anasarca   family agreeable to hold any further transfusion
on antibiotics for Pseudomonas UTI/bacteremia,
on antibiotics for Pseudomonas UTI/bacteremia, f/u rpt blood cx. ID following
unclear source of bleeding,  following, s/p 3uprbc, monitor Hb. Ongoing transfusions likely to be more burdensome than beneficial given terminal condition and anasarca  began bleeding overnight, HGb 7.0, family agreeable to hold any further transfusion
on antibiotics for Pseudomonas UTI/bacteremia, f/u rpt blood cx. ID following
on antibiotics for Pseudomonas UTI/bacteremia, f/u rpt blood cx. ID following

## 2025-05-06 NOTE — PROGRESS NOTE ADULT - TIME BILLING
I spent a total of 70 minutes on the date of this encounter coordinating the patient's care. This includes reviewing documentation pertinent to this hospital stay, results and imaging in addition to completing a MOLST Form with orders, progress note and physical examination on the patient. Further tests, medications, and procedures have been ordered as indicated. Laboratory results and the plan of care were communicated to the patient and their family member. Supporting documentation was completed and added to the patient's chart.
Time spent for extensive review of the physical chart, electronic health record, and documentation to obtain collateral information including but not limited to:    - Current inpatient records (ED, H&P, primary team, and consultants if applicable)   - Inpatient values/results (biomarkers, immunoassays, imaging, and microbiology results)   - Current or proposed treatment plans   - Pharmacotherapy review   Time spent for counseling and education with patient/family  Time spent discussing and coordinating care with primary team and interdisciplinary staff and floor staff.
Time spent for extensive review of the physical chart, electronic health record, and documentation to obtain collateral information including but not limited to:    - Current inpatient records (ED, H&P, primary team, and consultants if applicable)   - Inpatient values/results (biomarkers, immunoassays, imaging, and microbiology results)   - Current or proposed treatment plans   - Pharmacotherapy review   Time spent for counseling and education with patient/family  Time spent discussing and coordinating care with primary team and interdisciplinary staff and floor staff.  Time spent discussing and coordinating care with [facility] intake RN
Time spent for extensive review of the physical chart, electronic health record, and documentation to obtain collateral information including but not limited to:    - Current inpatient records (ED, H&P, primary team, and consultants if applicable)   - Inpatient values/results (biomarkers, immunoassays, imaging, and microbiology results)   - Current or proposed treatment plans   - Pharmacotherapy review   Time spent for counseling and education with patient/family  Time spent discussing and coordinating care with primary team and interdisciplinary staff and floor staff.

## 2025-05-06 NOTE — DISCHARGE NOTE PROVIDER - HOSPITAL COURSE
59  yr  m      advanced colon carcinoma diagnosed in 2016  stage 3       has developed Stage 4 incurable colon carcinoma and he has received chemotherapy RT and pelvic exenteration (in       has been considerable deterioration his health in the past 2 months with urinary bleeding from a uterostomy and confusion he was placed on home hospice care 2025 by physician [facility] ([place]  pt   wants to go home and this  is  being   accomplished ,  with  supportive care .  home  hospic e pe r pt's  wishes      now  with  elevated  wbc,  anemia,  peg/ ,  low  sbp/  tachycardia.  from  advanced  cancer    pt  with  extremely   poor  prognosis/  functional  paraplegia,   extreme  cachexia, / severe  protein calorie  malnutrition from his  cancer    discussed  at  length with  family/  brother,  and  daughter   at  bedside,  goal  is  purely    comfort  care/  no  further  blood  draws     patient   at end  stage of life given the advanced colon carcinoma/  pt  is  dnr/ dni     pt  was  on [facility]  hospice  prior, pe r family  and  now  plan  is  home  hospoice/  [place]     d/c plans  to  home  hospice/  and  transport   set  up  by  floor  team     pt  is  pre  terminal /  daughter   is  aware    that   pt  may    soon,  and   wants  pt  at  home  ,  given his  wishes       59  yr  m      advanced colon carcinoma diagnosed in 2016  stage 3       woth  advanced   Stage 4 incurable colon carcinoma and he has received chemotherapy RT and pelvic exenteration (in       has been considerable deterioration his health in the past 2 months with urinary bleeding from a uterostomy and confusion he was placed on home hospice care 2025 by physician [facility] ([place]).  pt   wants to go home and this  is  being   accomplished ,  with  supportive care .  home  hospic e pe r pt's  wishes      now  with  elevated  wbc,  anemia,  peg/ ,  low  sbp/  tachycardia.  from  advanced  cancer    had  bacteremia,  with  no  sepsis,  was   seen by ID dr marin   pt  frail,  pre  terminal,  with  advance d cancer    pt  with  extremely   poor  prognosis/  functional  paraplegia,   extreme  cachexia, / severe  protein calorie  malnutrition from his  cancer    discussed  at  length with  family/  brother,  and  daughter   at  bedside,  goal  is  purely    comfort  care/  no  further  blood  draws     patient   at end  stage of life given the advanced colon carcinoma/  pt  is  dnr/ dni     pt  was  on [facility]  hospice  prior, pe r family  and  now  plan  is  home  hospoice/  [facility]     d/c plans  to  home  hospice/  and  transport   set  up  by  floor  team     pt  is  pre  terminal /  daughter   is  aware    that   pt  may    soon,  and   wants  pt  at  home  ,  given his  wishes

## 2025-05-06 NOTE — PROGRESS NOTE ADULT - ASSESSMENT
advanced colon carcinoma diagnosed in 2016  stage 3      . He now has developed Stage 4 incurable colon carcinoma and he has received chemotherapy RT and pelvic exenteration (in 2021      has been considerable deterioration his health in the past 2 months with urinary bleeding from a uterostomy and confusion he was placed on home hospice care 22 April 2025 by physician [facility] ([place]).  pt   wants to go home and this may be accomplished if supportive care may be offered.       now  with  elevated  wbc,  anemia,  peg/ ,  low  sbp/  tachycardia    pt  with  poor  prognosis    will  discuss  with  family, about  stoppoing  blood  draws,  etc/  comfort  care     patient   at ernd stage of life given the advanced colon carcinoma/  pt  is  dnr/ dni    encounter  time  35  minutes   59  yr  m      advanced colon carcinoma diagnosed in 2016  stage 3       has developed Stage 4 incurable colon carcinoma and he has received chemotherapy RT and pelvic exenteration (in       has been considerable deterioration his health in the past 2 months with urinary bleeding from a uterostomy and confusion he was placed on home hospice care 2025 by physician [facility] ([place]).  pt   wants to go home and this may be accomplished if supportive care may be offered.       now  with  elevated  wbc,  anemia,  peg/ ,  low  sbp/  tachycardia.  from  advanced  cancer    pt  with  extremely   poor  prognosis    discussed  at  length with  family/  brother,  and  daughter   at  bedside,  goal  is  purely    comfort  care/  no  further  blood  draws     patient   at ernd stage of life given the advanced colon carcinoma/  pt  is  dnr/ dni     pt  was  on [facility]  hospice  prior, pe r family     d/c plans  to  home  hospice/  and  transport   set  up  by  floor  team     pt  is  pre  terminal /  ammy  is  aware    that   pt  may    soon,  and   wants  pt  at  home  ,  given his  wishes     encounter  time    >  30 minutes   59  yr  m      advanced colon carcinoma diagnosed in 2016  stage 3       has developed Stage 4 incurable colon carcinoma and he has received chemotherapy RT and pelvic exenteration (in       has been considerable deterioration his health in the past 2 months with urinary bleeding from a uterostomy and confusion he was placed on home hospice care 2025 by physician [facility] ([place]  pt   wants to go home and this may be accomplished if supportive care may be offered.       now  with  elevated  wbc,  anemia,  peg/ ,  low  sbp/  tachycardia.  from  advanced  cancer    anemia,   from cancer/  bleeding   from  urostomy  tube.  c/c     pt  with  extremely   poor  prognosis    discussed  at  length with  family/  brother,  and  daughter   at  bedside,  goal  is  purely    comfort  care/  no  further  blood  draws     patient   at ernd stage of life given the advanced colon carcinoma/  pt  is  dnr/ dni     pt  was  on [facility]  hospice  prior, pe r family     d/c plans  to  home  hospice/  and  transport   set  up  by  floor  team     pt  is  pre  terminal /  ammy  is  aware    that   pt  may    soon,  and   wants  pt  at  home  ,  given his  wishes     encounter  time    >  30 minutes

## 2025-05-06 NOTE — PROGRESS NOTE ADULT - PROBLEM SELECTOR PROBLEM 4
Painless hematuria
Blurry vision
Palliative care encounter
Painless hematuria
Palliative care encounter
Painless hematuria

## 2025-05-06 NOTE — PROGRESS NOTE ADULT - PROBLEM SELECTOR PLAN 4
stroke code called this am  seen by neurology, Low suspicion for stroke - suspect related to tachycardia, rapid afib. but cannot rule out minor stroke due AFib and hypercoaguability of malignancy off of AC   CTH: Mild chronic microvascular changes without evidence of an   acute transcortical infarction or hemorrhage.  defer CTA H/N as no LVO signs on exam and not thrombectomy candidate given advanced disease
Daughter and son in law arrived from [place] over the weekend, pt brother expected to arrive from [facility] tonight   Pt alternating periods of being awake with deep sleep with short periods of apnea, able to continue to make his needs known, attempted a few bites of lunch  Working with [facility] nurse Fina to arrange for d/c home in AM, will send script for PCA  Ongoing support to pt and family
unclear source of bleeding,  following, s/p 3uprbc, monitor Hb. Ongoing transfusions likely to be more burdensome than beneficial given terminal condition and anasarca  Patient opted to have CTA done, no active bleeding noted, f/u  recs
unclear source of bleeding,  following, s/p 3uprbc, monitor Hb. Ongoing transfusions likely to be more burdensome than beneficial given terminal condition and anasarca  Patient opted to have CTA done, no active bleeding noted, f/u  recs
unclear source of bleeding,  following, s/p 3uprbc, monitor Hb. Ongoing transfusions likely to be more burdensome than beneficial given terminal condition and anasarca  Family has opted for no further scans given patient discomfort when mobilized, in agreement that patient is not a good candidate for any invasive procedures given current condition and thus further imaging unlikely to 
Pt discharged today to home with [facility].  Pt was bolused x 2 with Morphine in the hour preceding his departure  Hospice nurse to meet family at home at 2PM , [facility] nurse to reconnect PCA pump at 4PM  Family has liquid morphine and liquid Ativan in home from comfort pack, son is aware that he can bolus pt with Morphine 1ml dose if pt is in pain prior to restarting of pump He can administer Ativan 2 mg SL if agitated   MOLST updated, copy in chart, original with son.

## 2025-05-06 NOTE — DISCHARGE NOTE NURSING/CASE MANAGEMENT/SOCIAL WORK - PATIENT PORTAL LINK FT
You can access the FollowMyHealth Patient Portal offered by [facility] by registering at the following website: [email or web address]. By joining [facility]’s FollowMyHealth portal, you will also be able to view your health information using other applications (apps) compatible with our system.

## 2025-05-06 NOTE — PROGRESS NOTE ADULT - PROBLEM SELECTOR PROBLEM 1
Pain due to neoplasm

## 2025-05-06 NOTE — PROGRESS NOTE ADULT - SUBJECTIVE AND OBJECTIVE BOX
follow up on:  complex medical decision making related to goals of care    OVERNIGHT EVENTS:    New onset of terminal agitation overnight. Used PCA more often as a result. Appears in active dying process, Family remains at bedside holding conn    Review of systems:     Pain:  [ ] yes [ ] no appears comfortable on present dosing of PCA  QOL impact -   Location -                    Aggravating factors -  Quality -  Radiation -  Timing-  Severity (0-10 scale):  Minimal acceptable level (0-10 scale):     Dyspnea:      denies                        Agitation    present                 Depression:   denies  Fatigue:     present      Nausea:      denies                         Loss of appetite: present   Constipation:  colostomy stooling             Diarrhea:     denies    Unable to obtain/Limited due to poor mental status    MEDICATIONS  (STANDING):  carvedilol 3.125 milliGRAM(s) Oral every 12 hours  chlorhexidine 2% Cloths 1 Application(s) Topical daily  famotidine    Tablet 20 milliGRAM(s) Oral daily  midodrine. 5 milliGRAM(s) Oral three times a day  morphine PCA (5 mG/mL) 30 milliLiter(s) PCA Continuous PCA Continuous  OLANZapine Disintegrating Tablet 5 milliGRAM(s) Oral at bedtime    MEDICATIONS  (PRN):  acetaminophen     Tablet .. 650 milliGRAM(s) Oral every 6 hours PRN Temp greater or equal to 38C (100.4F), Mild Pain (1 - 3)  melatonin 3 milliGRAM(s) Oral at bedtime PRN Insomnia  senna Syrup 10 milliLiter(s) Oral at bedtime PRN Constipation  sodium chloride 0.9% lock flush 10 milliLiter(s) IV Push every 1 hour PRN Pre/post blood products, medications, blood draw, and to maintain line patency    PHYSICAL EXAM:    General: chronically ill appearing male in bed with c/o right hip and leg pain in bed c/o moderate back pain  HEENT: A/T, N/C anicteric, mm dry  Neck: supple no JVD   Cardiovascular: l S1, S2,tachycardic  no murmur, + 4 edema to BLE/BUE with weeping,  full body anasarca   Respiratory: short periods of apnea  GI:  distended, + colostomy + stooling , + venting PEG  :  + urostomy,  hematuria  Musculoskeletal: weak, moves her b/l arms without difficulty  Neurologic: oriented x3, non focal   Skin: Glans Penis, Stage IV Pressure Injury ; Scrotum, Stage II Pressure Injury, Left Ischium, Stage II Pressure Injury; Sacral Stage III Pressure Injury  Lymph: no lymphadenopathy   Psychiatric: calm and appropriate during exam     Vital Signs Last 24 Hrs  T(C): 36.7 (06 May 2025 11:50), Max: 36.7 (06 May 2025 11:50)  T(F): 98 (06 May 2025 11:50), Max: 98 (06 May 2025 11:50)  HR: 90 (06 May 2025 11:50) (86 - 98)  BP: 82/61 (06 May 2025 11:50) (82/61 - 108/79)  BP(mean): --  RR: 15 (06 May 2025 11:50) (15 - 18)  SpO2: 97% (06 May 2025 11:50) (90% - 97%)    Parameters below as of 06 May 2025 11:50  Patient On (Oxygen Delivery Method): nasal cannula  O2 Flow (L/min): 5      Palliative Performance Scale/Karnofsky Score: 10%  ECOG Performance: 4     LABS:                          7.9    28.20 )-----------( 347      ( 05 May 2025 11:42 )             23.5       RADIOLOGY & ADDITIONAL STUDIES:

## 2025-05-06 NOTE — PROGRESS NOTE ADULT - PROBLEM SELECTOR PLAN 2
Dx in 2018, under the care of oncologist  Dr Briggs at [facility], followed by palliative Lachelle Tan NP for pain management and support.  S/P chemo, surgery  Enrolled in [facility] Home hospice program 5 days PTA to align with pt need for IV opioid management  and pt and family wish for pt to remain at home.  On morphine PCA, fentanyl patch for pain control at home  DNR/DNI
Dx in 2018, under the care of oncologist  Dr Briggs at [facility], followed by palliative Lachelle Tan NP for pain management and support.  S/P chemo, surgery  Enrolled in [facility] Home hospice program 5 days PTA to align with pt need for IV opioid management  and pt and family wish for pt to remain at home.  On morphine PCA,  DNR/DNI
Dx in 2018, under the care of oncologist  Dr Briggs at [facility], followed by palliative Lachelle Tan NP for pain management and support.  S/P chemo, surgery  Enrolled in [facility] Home hospice program 5 days PTA to align with pt need for IV opioid management  and pt and family wish for pt to remain at home.  On morphine PCA, fentanyl patch for pain control at home  DNR/DNI
Dx in 2018, under the care of oncologist  [place] [facility] by palliative Lachelle Tan NP for pain management and support.  S/P chemo, surgery  Enrolled in [facility] Home hospice program 5 days PTA to align with pt need for IV opioid management  and pt and family wish for pt to remain at home.  On morphine PCA, fentanyl patch for pain control at home  DNR/DNI
Dx in 2018, under the care of oncologist  Dr Briggs at [facility], followed by palliative Lachelle Tan NP for pain management and support.  S/P chemo, surgery  Enrolled in [facility] Home hospice program 5 days PTA to align with pt need for IV opioid management  and pt and family wish for pt to remain at home.  On morphine PCA,  DNR/DNI
Dx in 2018, under the care of oncologist  Dr Briggs at [facility], followed by palliative Lachelle Tan NP for pain management and support.  S/P chemo, surgery  Enrolled in [facility] Home hospice program 5 days PTA to align with pt need for IV opioid management  and pt and family wish for pt to remain at home.  On morphine PCA,  DNR/DNI

## 2025-05-06 NOTE — PROGRESS NOTE ADULT - PROBLEM SELECTOR PLAN 1
Fentanyl patch dc'd 2/2 increasing anasarca, unclear efficacy at this time. +venting PEG, GI absorption unreliable.  Pt has known sensitivity to higher doses of opioids in the past with quick titration     Pain better controlled and patient appears more comfortable on adjusted morphine gtt dose, dose requirements reviewed, utilizing less freq prns  Will increase morphine gtt to 5mg/hr to further optimize pain control and continue w 5mg q15 min prn bolus dose   Continue Dexamethasone 4 mg IV BID for neuropathic component of pain    Continue bowel regimen to avoid OIC, +colostomy output at present
Pain well managed on present pump dose  received 12 boluses on 16 attempts over past 24 hrs.   continue morphine gtt at 6mg/hr continue bolus  6mg q15 min   continue Morphine 2 mg IV q 1 hr prn   continue  Dexamethasone 6 mg IV BID for neuropathic component of pain    Continue bowel regimen to avoid OIC, +colostomy output at present
Pain well managed on present pump dose  received 18 boluses on 18 attempts over past 24 hrs.   continue morphine gtt at 6mg/hr continue bolus  6mg q15 min   continue Morphine 2 mg IV q 1 hr prn   continue  Dexamethasone 6 mg IV BID for neuropathic component of pain    Continue bowel regimen to avoid OIC, +colostomy output at present
Fentanyl patch dc'd 2/2 increasing anasarca, unclear efficacy at this time. +venting PEG, GI absorption unreliable.  Pt has known sensitivity to higher doses of opioids in the past with quick titration     Pain increasing to right thigh and abdomen, reviewed PCA pump    Increase morphine gtt to 6mg/hr to further optimize pain control and increase bolus dosing to 6mg q15 min   continue Morphine 2 mg IV q 1 hr prn   Increase  Dexamethasone 6 mg IV BID for neuropathic component of pain    Continue bowel regimen to avoid OIC, +colostomy output at present
Fentanyl patch dc'd 2/2 increasing anasarca, unclear efficacy at this time. +venting PEG, GI absorption unreliable.  Pt has known sensitivity to higher doses of opioids in the past with quick titration     Pain better controlled and patient appears more comfortable on adjusted morphine gtt dose, dose requirements reviewed, utilizing less freq prns  Continue morphine gtt to 5mg/hr to further optimize pain control and continue w 5mg q15 min prn bolus dose   Continue Dexamethasone 4 mg IV BID for neuropathic component of pain    Continue bowel regimen to avoid OIC, +colostomy output at present
Fentanyl patch dc'd 2/2 increasing anasarca, unclear efficacy at this time. +venting PEG, GI absorption unreliable.  Pt has known sensitivity to higher doses of opioids in the past with quick titration     Morphine gtt increased yesterday to 2mg/hr with 4mg bolus q15 min prn, has utilized >120 mg morphine over past 24h, tolerating thus far however w suboptimal pain control  Increase morphine gtt to 4mg/hr w 5mg q15 min prn bolus dose  Consider rotation to dilaudid PCA if increasing pain requirements, continue to monitor dose requirements  Continue Dexamethasone 4 mg IV BID for neuropathic component of pain    Continue bowel regimen to avoid OIC, +colostomy output at present

## 2025-05-06 NOTE — DISCHARGE NOTE PROVIDER - NSDCMRMEDTOKEN_GEN_ALL_CORE_FT
amiodarone 100 mg oral tablet: 2 tab(s) orally once a day  Augmentin 400 mg-57 mg/5 mL oral liquid: 5 milliliter(s) orally once a day  famotidine 20 mg oral tablet: 1 tab(s) orally once a day  OLANZapine 5 mg oral tablet, disintegratin tab(s) orally once a day (at bedtime)  ramelteon 8 mg oral tablet: 1 tab(s) orally once a day  simethicone 40 mg/0.6 mL oral liquid: 1.2 milliliter(s) orally once a day   acetaminophen 325 mg oral tablet: 2 tab(s) orally every 6 hours As needed Temp greater or equal to 38C (100.4F), Mild Pain (1 - 3)  famotidine 20 mg oral tablet: 1 tab(s) orally once a day  metoprolol tartrate 25 mg oral tablet: 0.5 tab(s) orally 2 times a day Please take half a tablet two times a day and  hold for systolic blood pressure less than 110  ramelteon 8 mg oral tablet: 1 tab(s) orally once a day  simethicone 40 mg/0.6 mL oral liquid: 1.2 milliliter(s) orally once a day

## 2025-05-06 NOTE — DISCHARGE NOTE NURSING/CASE MANAGEMENT/SOCIAL WORK - FINANCIAL ASSISTANCE
[facility] provides services at a reduced cost to those who are determined to be eligible through [facility]’s financial assistance program. Information regarding [facility]’s financial assistance program can be found by going to [email or web address] or by calling [phone].

## 2025-05-06 NOTE — PROGRESS NOTE ADULT - PROBLEM SELECTOR PROBLEM 2
Colorectal cancer

## 2025-05-06 NOTE — PROGRESS NOTE ADULT - ASSESSMENT
59M w/ PMHx of colorectal CA s/p colostomy,  venting PEG, s/p urostomy bag, severe AS/Bicuspid AV, TAA, HTN, HLD, pAfib, sacral ulcer s/p wound bag and B/L  hip fracture who presents to the ED with bleeding from urostomy bag. Pt was   admitted to [facility] 5 days  for home hospice  and was started on PCA Morphine for increased pain. Pt  presents to the ED with bleeding from urostomy bag.

## 2025-05-06 NOTE — DISCHARGE NOTE NURSING/CASE MANAGEMENT/SOCIAL WORK - NSPROMEDSBROUGHTTOHOSP_GEN_A_NUR
Avoid prolonged standing or sitting without elevating your legs.  - Apply tubigrip to your legs ending 2 fingers below back of knee without wrinkles.     Should you experience any significant changes in your wound(s), such as infection (redness, swelling, localized heat, increased pain, fever > 101 F, chills) or have any questions regarding your home care instructions, please contact the wound center at [phone]. If after hours, contact your primary care physician or go to the hospital emergency room.   Keep dressing clean, dry and covered while bathing. Only change dressing if it becomes over saturated, soiled or falls off.     yes

## 2025-05-06 NOTE — PROGRESS NOTE ADULT - PROVIDER SPECIALTY LIST ADULT
Hospitalist
Infectious Disease
Hospitalist
Hospitalist
Internal Medicine
Urology
Hospitalist
Urology
Urology
Hospitalist
Neurology
Urology
Urology
Palliative Care

## 2025-05-15 DIAGNOSIS — Z51.5 ENCOUNTER FOR PALLIATIVE CARE: ICD-10-CM

## 2025-05-15 DIAGNOSIS — I95.9 HYPOTENSION, UNSPECIFIED: ICD-10-CM

## 2025-05-15 DIAGNOSIS — G89.3 NEOPLASM RELATED PAIN (ACUTE) (CHRONIC): ICD-10-CM

## 2025-05-15 DIAGNOSIS — L89.154 PRESSURE ULCER OF SACRAL REGION, STAGE 4: ICD-10-CM

## 2025-05-15 DIAGNOSIS — Z93.1 GASTROSTOMY STATUS: ICD-10-CM

## 2025-05-15 DIAGNOSIS — E78.5 HYPERLIPIDEMIA, UNSPECIFIED: ICD-10-CM

## 2025-05-15 DIAGNOSIS — E43 UNSPECIFIED SEVERE PROTEIN-CALORIE MALNUTRITION: ICD-10-CM

## 2025-05-15 DIAGNOSIS — I10 ESSENTIAL (PRIMARY) HYPERTENSION: ICD-10-CM

## 2025-05-15 DIAGNOSIS — N39.0 URINARY TRACT INFECTION, SITE NOT SPECIFIED: ICD-10-CM

## 2025-05-15 DIAGNOSIS — I48.0 PAROXYSMAL ATRIAL FIBRILLATION: ICD-10-CM

## 2025-05-15 DIAGNOSIS — F44.4 CONVERSION DISORDER WITH MOTOR SYMPTOM OR DEFICIT: ICD-10-CM

## 2025-05-15 DIAGNOSIS — L89.222 PRESSURE ULCER OF LEFT HIP, STAGE 2: ICD-10-CM

## 2025-05-15 DIAGNOSIS — Z66 DO NOT RESUSCITATE: ICD-10-CM

## 2025-05-15 DIAGNOSIS — R78.81 BACTEREMIA: ICD-10-CM

## 2025-05-15 DIAGNOSIS — L89.894 PRESSURE ULCER OF OTHER SITE, STAGE 4: ICD-10-CM

## 2025-05-15 DIAGNOSIS — Z96.0 PRESENCE OF UROGENITAL IMPLANTS: ICD-10-CM

## 2025-05-15 DIAGNOSIS — C18.9 MALIGNANT NEOPLASM OF COLON, UNSPECIFIED: ICD-10-CM

## 2025-05-15 DIAGNOSIS — R31.9 HEMATURIA, UNSPECIFIED: ICD-10-CM

## 2025-05-15 DIAGNOSIS — D62 ACUTE POSTHEMORRHAGIC ANEMIA: ICD-10-CM

## 2025-05-15 DIAGNOSIS — R29.710 NIHSS SCORE 10: ICD-10-CM

## 2025-05-15 DIAGNOSIS — B96.5 PSEUDOMONAS (AERUGINOSA) (MALLEI) (PSEUDOMALLEI) AS THE CAUSE OF DISEASES CLASSIFIED ELSEWHERE: ICD-10-CM

## 2025-05-15 DIAGNOSIS — Z93.3 COLOSTOMY STATUS: ICD-10-CM

## 2025-05-15 DIAGNOSIS — Z79.01 LONG TERM (CURRENT) USE OF ANTICOAGULANTS: ICD-10-CM
